# Patient Record
Sex: FEMALE | Race: WHITE | Employment: FULL TIME | URBAN - METROPOLITAN AREA
[De-identification: names, ages, dates, MRNs, and addresses within clinical notes are randomized per-mention and may not be internally consistent; named-entity substitution may affect disease eponyms.]

---

## 2017-01-03 ENCOUNTER — OFFICE VISIT (OUTPATIENT)
Dept: OTOLARYNGOLOGY | Facility: CLINIC | Age: 22
End: 2017-01-03
Payer: COMMERCIAL

## 2017-01-03 ENCOUNTER — HOSPITAL ENCOUNTER (EMERGENCY)
Facility: CLINIC | Age: 22
Discharge: HOME | End: 2017-01-03
Attending: NURSE PRACTITIONER | Admitting: NURSE PRACTITIONER
Payer: COMMERCIAL

## 2017-01-03 VITALS
OXYGEN SATURATION: 95 % | SYSTOLIC BLOOD PRESSURE: 125 MMHG | DIASTOLIC BLOOD PRESSURE: 80 MMHG | RESPIRATION RATE: 16 BRPM | TEMPERATURE: 98.1 F

## 2017-01-03 VITALS
HEIGHT: 61 IN | TEMPERATURE: 98.7 F | WEIGHT: 211 LBS | DIASTOLIC BLOOD PRESSURE: 80 MMHG | BODY MASS INDEX: 39.84 KG/M2 | SYSTOLIC BLOOD PRESSURE: 127 MMHG | HEART RATE: 115 BPM

## 2017-01-03 DIAGNOSIS — J31.2 CHRONIC PHARYNGITIS: Primary | ICD-10-CM

## 2017-01-03 DIAGNOSIS — J06.9 VIRAL URI WITH COUGH: ICD-10-CM

## 2017-01-03 DIAGNOSIS — J35.1 TONSILLAR HYPERTROPHY: ICD-10-CM

## 2017-01-03 LAB
INTERNAL QC OK POCT: YES
S PYO AG THROAT QL IA.RAPID: NEGATIVE

## 2017-01-03 PROCEDURE — 87081 CULTURE SCREEN ONLY: CPT | Performed by: NURSE PRACTITIONER

## 2017-01-03 PROCEDURE — 99213 OFFICE O/P EST LOW 20 MIN: CPT

## 2017-01-03 PROCEDURE — 99204 OFFICE O/P NEW MOD 45 MIN: CPT | Performed by: OTOLARYNGOLOGY

## 2017-01-03 PROCEDURE — 87880 STREP A ASSAY W/OPTIC: CPT | Performed by: NURSE PRACTITIONER

## 2017-01-03 PROCEDURE — 99213 OFFICE O/P EST LOW 20 MIN: CPT | Performed by: NURSE PRACTITIONER

## 2017-01-03 ASSESSMENT — ENCOUNTER SYMPTOMS
CARDIOVASCULAR NEGATIVE: 1
MUSCULOSKELETAL NEGATIVE: 1
EYES NEGATIVE: 1
CONSTITUTIONAL NEGATIVE: 1
NEUROLOGICAL NEGATIVE: 1
COUGH: 1
GASTROINTESTINAL NEGATIVE: 1
RHINORRHEA: 1

## 2017-01-03 ASSESSMENT — PAIN SCALES - GENERAL: PAINLEVEL: SEVERE PAIN (7)

## 2017-01-03 NOTE — DISCHARGE INSTRUCTIONS

## 2017-01-03 NOTE — PROGRESS NOTES
History of Present Illness - Chloe Obando is a 21 year old female who presents with a several year history of recurrent and persistent sore throat. She is currently having a URI and was in the urgent care this morning for a rapid strep, which was negative. She had a lot of strep throat in early childhood. She denies any tonsil stone production. She had anesthesia for wisdom tooth extraction without problems. She has no personal or family history of bleeding disorder.    Past Medical History -   Patient Active Problem List   Diagnosis     HYPERLIPIDEMIA     Irregular menstrual cycle     Other acne     Plantar warts     GERD (gastroesophageal reflux disease)     Obesity     panic attack     Generalized anxiety disorder     Major depressive disorder, single episode, mild (H)       Current Medications -   Current outpatient prescriptions:      escitalopram (LEXAPRO) 20 MG tablet, Take 1 tablet (20 mg) by mouth daily, Disp: 90 tablet, Rfl: 0     progesterone (PROMETRIUM) 200 MG capsule, Take 1 capsule (200 mg) by mouth daily for 10 days, Disp: 10 capsule, Rfl: 0    Allergies -   Allergies   Allergen Reactions     Hazelnuts [Nuts] Rash       Social History -   Social History     Social History     Marital Status: Single     Spouse Name: N/A     Number of Children: N/A     Years of Education: N/A     Social History Main Topics     Smoking status: Never Smoker      Smokeless tobacco: Never Used      Comment: parents don't smoke in the house     Alcohol Use: No     Drug Use: No     Sexual Activity:     Partners: Male     Birth Control/ Protection: Condom     Other Topics Concern     Parent/Sibling W/ Cabg, Mi Or Angioplasty Before 65f 55m? No     Social History Narrative       Family History -   Family History   Problem Relation Age of Onset     DIABETES Mother      gestational diabetes (history of)     Family History Negative Father      C.A.D. Maternal Grandmother      Quad. Bypass at age 74     DIABETES Maternal  "Grandmother      Asthma Maternal Grandmother      Lipids Maternal Grandmother      Arthritis Maternal Grandfather      Hypertension Paternal Grandmother      Lipids Paternal Grandmother      cholesterol     Family History Negative Paternal Grandfather        Review of Systems - As per HPI and PMHx, otherwise 7 system review of the head and neck negative. 10+ system review negative.    Physical Exam  /80 mmHg  Pulse 115  Temp(Src) 98.7  F (37.1  C) (Oral)  Ht 1.549 m (5' 1\")  Wt 95.709 kg (211 lb)  BMI 39.89 kg/m2  LMP 08/02/2016  General - The patient is well nourished and well developed, and appears to have good nutritional status.  Alert and oriented to person and place, answers questions and cooperates with examination appropriately.   Head and Face - Normocephalic and atraumatic, with no gross asymmetry noted of the contour of the facial features.  The facial nerve is intact, with strong symmetric movements.  Voice and Breathing - The patient was breathing comfortably without the use of accessory muscles. There was no wheezing, stridor, or stertor.  The patients voice was clear and strong, and had appropriate pitch and quality.  Ears - Bilateral pinna and EACs with normal appearing overlying skin. Tympanic membrane intact with good mobility on pneumatic otoscopy bilaterally. Bony landmarks of the ossicular chain are normal. The tympanic membranes are normal in appearance. No retraction, perforation, or masses.  No fluid or purulence was seen in the external canal or the middle ear.   Eyes - Extraocular movements intact.  Sclera were not icteric or injected, conjunctiva were pink and moist.  Mouth - Examination of the oral cavity showed pink, healthy oral mucosa. No lesions or ulcerations noted.  The tongue was mobile and midline, and the dentition were in good condition.    Throat - The walls of the oropharynx were smooth, pink, moist, symmetric, and had no lesions or ulcerations.  The tonsillar " pillars and soft palate were symmetric.  The uvula was midline on elevation. Tonsils are 3+ bilateral with purulent exudate.  Neck - Normal midline excursion of the laryngotracheal complex during swallowing.  Full range of motion on passive movement.  Palpation of the occipital, submental, submandibular, internal jugular chain, and supraclavicular nodes did not demonstrate any abnormal lymph nodes or masses.  The carotid pulse was palpable bilaterally.  Palpation of the thyroid was soft and smooth, with no nodules or goiter appreciated.  The trachea was mobile and midline.  Nose - External contour is symmetric, no gross deflection or scars.  Nasal mucosa is pink and moist with no abnormal mucus.  The septum was midline and non-obstructive, turbinates of normal size and position.  No polyps, masses, or purulence noted on examination.  Heart:  Regular rate and rhythm, no murmurs.  Lungs:  Chest clear to auscultation bilaterally    Rapid strep today is negative.      Assessment - Chloe Obando is a 21 year old female with current acute nonstreptococcal tonsillitis, but with a long history of trouble with sore throat on most days of the year. Based on the physical exam and history, my recommendation is for tonsillectomy (without adenoidectomy).  The remainder of the visit was spent discussing the risks and benefits of tonsillectomy.  These included:  The risks of general anesthesia, bleeding, infection, possible need for emergency surgery to control bleeding, possible alteration of speech and swallowing, and even the possibility of continued throat problems following surgery.  She will consider the option and call to schedule if desired.         Dr. Kamini Valdovinos MD  Otolaryngology  [facility]

## 2017-01-03 NOTE — ED PROVIDER NOTES
History     Chief Complaint   Patient presents with     Pharyngitis     HPI  Chloe Obando is a 21 year old female who comes to  with concern of strep. She has had 3 days of cough and nasal congestion symtpoms as well as a sore thraot. No fever or chills.     I have reviewed the Medications, Allergies, Past Medical and Surgical History, and Social History in the Epic system.    Review of Systems   Constitutional: Negative.    HENT: Positive for congestion, postnasal drip and rhinorrhea.    Eyes: Negative.    Respiratory: Positive for cough.    Cardiovascular: Negative.    Gastrointestinal: Negative.    Genitourinary: Negative.    Musculoskeletal: Negative.    Skin: Negative.    Neurological: Negative.        Physical Exam   BP: 125/80 mmHg  Heart Rate: 105  Temp: 98.1  F (36.7  C)  Resp: 16  SpO2: 95 %  Physical Exam   Constitutional: She is oriented to person, place, and time. She appears well-developed and well-nourished. No distress.   HENT:   Bilateral effusions  Nose congested  PND  Mildly red thraot   Eyes: Conjunctivae and EOM are normal.   Neck: Neck supple.   Cardiovascular: Normal rate, regular rhythm and normal heart sounds.    No murmur heard.  Pulmonary/Chest: Effort normal. No respiratory distress. She has no wheezes. She has no rales. She exhibits no tenderness.   Abdominal: Soft.   Musculoskeletal: Normal range of motion. She exhibits no edema or tenderness.   Lymphadenopathy:     She has cervical adenopathy.   Neurological: She is alert and oriented to person, place, and time. No cranial nerve deficit.   Skin: Skin is warm. No rash noted.       ED Course   Procedures           Labs Ordered and Resulted from Time of ED Arrival Up to the Time of Departure from the ED   RAPID STREP GROUP A SCREEN POCT   BETA STREP GROUP A CULTURE   strep negative    Assessments & Plan (with Medical Decision Making)   Viral pharyngitis    I have reviewed the nursing notes.    I have reviewed the findings, diagnosis,  plan and need for follow up with the patient.    New Prescriptions    No medications on file       Final diagnoses:   Viral URI with cough       1/3/2017   [facility] EMERGENCY DEPARTMENT      Robby Whitley, ANU CNP  01/03/17 [id number]

## 2017-01-03 NOTE — NURSING NOTE
"Initial /80 mmHg  Pulse 115  Temp(Src) 98.7  F (37.1  C) (Oral)  Ht 1.549 m (5' 1\")  Wt 95.709 kg (211 lb)  BMI 39.89 kg/m2  LMP 08/02/2016 Estimated body mass index is 39.89 kg/(m^2) as calculated from the following:    Height as of this encounter: 1.549 m (5' 1\").    Weight as of this encounter: 95.709 kg (211 lb). .    Nicky Rice CMA    "

## 2017-01-03 NOTE — ED AVS SNAPSHOT
[facility] Emergency Department    [address] [address]    Phone:  [phone]    Fax:  [phone]                                       Chloe Obando   MRN: [id number]    Department:  [facility] Emergency Department   Date of Visit:  1/3/2017           Patient Information     Date Of Birth          1995        Your diagnoses for this visit were:     Viral URI with cough        You were seen by Robby Whitley, ANU CNP.      Follow-up Information     Follow up with Mervat Schroeder DO.    Specialties:  Family Practice, Urgent Care    Why:  As needed, If symptoms worsen    Contact information:    [facility]  [address] [place] [address]  [phone]          Discharge Instructions         Viral Upper Respiratory Illness (Adult)  You have a viral upper respiratory illness (URI), which is another term for the common cold. This illness is contagious during the first few days. It is spread through the air by coughing and sneezing. It may also be spread by direct contact (touching the sick person and then touching your own eyes, nose, or mouth). Frequent handwashing will decrease risk of spread. Most viral illnesses go away within 7 to 10 days with rest and simple home remedies. Sometimes the illness may last for several weeks. Antibiotics will not kill a virus, and they are generally not prescribed for this condition.    Home care    If symptoms are severe, rest at home for the first 2 to 3 days. When you resume activity, don't let yourself get too tired.    Avoid being exposed to cigarette smoke (yours or others ).    You may use acetaminophen or ibuprofen to control pain and fever, unless another medicine was prescribed. (Note: If you have chronic liver or kidney disease, have ever had a stomach ulcer or gastrointestinal bleeding, or are taking blood-thinning medicines, talk with your healthcare provider before using these medicines.) Aspirin should  never be given to anyone under 18 years of age who is ill with a viral infection or fever. It may cause severe liver or brain damage.    Your appetite may be poor, so a light diet is fine. Avoid dehydration by drinking 6 to 8 glasses of fluids per day (water, soft drinks, juices, tea, or soup). Extra fluids will help loosen secretions in the nose and lungs.    Over-the-counter cold medicines will not shorten the length of time you re sick, but they may be helpful for the following symptoms: cough, sore throat, and nasal and sinus congestion. (Note: Do not use decongestants if you have high blood pressure.)  Follow-up care  Follow up with your healthcare provider, or as advised.  When to seek medical advice  Call your healthcare provider right away if any of these occur:    Cough with lots of colored sputum (mucus)    Severe headache; face, neck, or ear pain    Difficulty swallowing due to throat pain    Fever of 100.4 F (38 C)  Call 911, or get immediate medical care  Call emergency services right away if any of these occur:    Chest pain, shortness of breath, wheezing, or difficulty breathing    Coughing up blood    Inability to swallow due to throat pain    7242-8374 The [employer]. [address] [place] [address]. All rights reserved. This information is not intended as a substitute for professional medical care. Always follow your healthcare professional's instructions.          24 Hour Appointment Hotline       To make an appointment at any [facility], call [phone] ([phone]). If you don't have a family doctor or clinic, we will help you find one. [facility] clinics are conveniently located to serve the needs of you and your family.             Review of your medicines      Our records show that you are taking the medicines listed below. If these are incorrect, please call your family doctor or clinic.        Dose / Directions Last dose taken    escitalopram 20 MG tablet    Commonly known as:  LEXAPRO   Dose:  20 mg   Quantity:  90 tablet        Take 1 tablet (20 mg) by mouth daily   Refills:  0        progesterone 200 MG capsule   Commonly known as:  PROMETRIUM   Dose:  200 mg   Quantity:  10 capsule        Take 1 capsule (200 mg) by mouth daily for 10 days   Refills:  0                Procedures and tests performed during your visit     Beta strep group A r/o culture    Rapid strep group A screen POCT      Orders Needing Specimen Collection     None      Pending Results     No orders found from 1/2/2017 to 1/4/2017.            Pending Culture Results     No orders found from 1/2/2017 to 1/4/2017.       Test Results from your hospital stay           1/3/2017 12:32 PM - Dior Romo LPN      Component Results     Component Value Ref Range & Units Status    Rapid Strep A Screen negative neg Final    Internal QC OK Yes  Final                Thank you for choosing [facility]       Thank you for choosing [facility] for your care. Our goal is always to provide you with excellent care. Hearing back from our patients is one way we can continue to improve our services. Please take a few minutes to complete the written survey that you may receive in the mail after you visit with us. Thank you!        Priv[employer] Information     [employer] gives you secure access to your electronic health record. If you see a primary care provider, you can also send messages to your care team and make appointments. If you have questions, please call your primary care clinic.  If you do not have a primary care provider, please call [phone] and they will assist you.        Care EveryWhere ID     This is your Care EveryWhere ID. This could be used by other organizations to access your [facility] medical records  [id number]        After Visit Summary       This is your record. Keep this with you and show to your community pharmacist(s) and doctor(s) at your next visit.

## 2017-01-05 LAB
BACTERIA SPEC CULT: NORMAL
MICRO REPORT STATUS: NORMAL
SPECIMEN SOURCE: NORMAL

## 2017-01-16 ENCOUNTER — TELEPHONE (OUTPATIENT)
Dept: OTOLARYNGOLOGY | Facility: CLINIC | Age: 22
End: 2017-01-16

## 2017-01-16 NOTE — TELEPHONE ENCOUNTER
Reason for Call:  Other surgery    Detailed comments: Pt wants to schedule surgery, please call    Phone Number Patient can be reached at: Home number on file [phone] (home)    Best Time: today    Can we leave a detailed message on this number? YES    Call taken on 1/16/2017 at 12:20 PM by Carmencita Alcala

## 2017-01-16 NOTE — TELEPHONE ENCOUNTER
Surgery Pre-Certification    Medical Record Number: [id number]  Chloe Obando  YOB: 1995   Phone: [phone] (home)   Primary Provider: Mervat Schroeder    Diagnosis and ICD-10 Code:  Recurrent Tonsillitis (J03.00)    Surgeon: KEREN Valdovinos MD  Surgical Procedure: Tonsillectomy  BMI:  39.89  Consent signed? No    Date signed: N/A  Hospital: [facility]  In-Patient/ Out-Patient status: Outpatient  Length of surgery: 20 Minutes  Anesthesia: GEN  Implanted Cardiac Device: No    Date of Surgery: 3/1/17  When post-op appointment needed: 4 Weeks     Special Requests/Equipment: None     Requestor: Brenda Gerard LPN

## 2017-01-16 NOTE — TELEPHONE ENCOUNTER
Attempted to call patient to schedule surgery but no answer Left  Message for patient to call us at [phone]  to schedule surgery.Brenda Gerard LPN

## 2017-01-16 NOTE — Clinical Note
SURGERYPLANNING/SCHEDULING WORKSHEET                              [facility]  [address]  [facility] [address]  [phone]                          Chloe Obando                :  1995  MRN:  [id number]  Phone: [phone] (home)    Same Day Surgery [phone]   Surgeon: Kamini Valdovinos MD  Diagnosis:   Recurrent tonsillitis  Allergies:  Hazelnuts   A preoperative evaluation by the primary care provider has been requested to summarize and modify, where possible, medical risks to the proposed surgery.  Specific risks requiring evaluation include   Patient Active Problem List    Diagnosis     Generalized anxiety disorder     Major depressive disorder, single episode, mild (H)     panic attack     Obesity     GERD (gastroesophageal reflux disease)     Plantar warts     Other acne     Irregular menstrual cycle     HYPERLIPIDEMIA       ====================================================  Surgical Procedure:  ENT bilateral Tonsillectomy  Length of Procedure:  20 minutes  Type of anesthesia:  General  The proposed surgical procedure is considered LOW risk.  Date of Procedure:___3/1/17_____________    Time: ____will call_________________       Informed Consent Obtained and Signed:  NO  ====================================================  Instructions to Same Day Surgery Staff  none  Special Equipment: None  Preop Antibiotic:  none  Preop Pain Meds:  None  PreOp Orders:  None  ====================================================  Instructions to the patient:  Preop physical exam scheduled (within 30 days or 7 days prior) with:   _______Alexandre_____________  Clinic:  ____[facility]________________                                         Date______________Time_________________________  Come to the hospital at: _____1 hour prior to surgery___________________________  HOME PREPARATION:   Bathe and brush teeth the morning of  surgery.  Take medications with a sip of water the morning of surgery:     May have  a light meal, toast and clear liquids, up to 6 hrs before surgery  May have clear liquids (liquids one can read through) up to 4 hrs before surgery  NOTHING after 4 hrs before surgery  Stop aspirin 7-10 days before surgery  Stop NSAIDS (Ibuproven, Naproxen, etc) 5 days before surgery  Stop Plavix 7-10 days before surgery      Kamini Valdovinos MD    1/16/2017

## 2017-01-17 ENCOUNTER — MYC MEDICAL ADVICE (OUTPATIENT)
Dept: FAMILY MEDICINE | Facility: CLINIC | Age: 22
End: 2017-01-17

## 2017-01-17 DIAGNOSIS — N91.2 AMENORRHEA: Primary | ICD-10-CM

## 2017-02-06 ENCOUNTER — TELEPHONE (OUTPATIENT)
Dept: OTOLARYNGOLOGY | Facility: CLINIC | Age: 22
End: 2017-02-06

## 2017-02-06 NOTE — TELEPHONE ENCOUNTER
Attempted to call patient to reschedule surgery but no answer. I left message to call us at [phone] and let us   Know what day she wants to reschedule for.  Brenda Gerard LPN

## 2017-02-06 NOTE — TELEPHONE ENCOUNTER
Reason for Call:  Other surgery    Detailed comments: Pt wants to reschedule surgery, please call her    Phone Number Patient can be reached at: Home number on file [phone] (home)    Best Time: today    Can we leave a detailed message on this number? YES    Call taken on 2/6/2017 at 12:11 PM by Carmecnita Alcala

## 2017-02-16 ENCOUNTER — TELEPHONE (OUTPATIENT)
Dept: OTOLARYNGOLOGY | Facility: CLINIC | Age: 22
End: 2017-02-16

## 2017-02-16 NOTE — TELEPHONE ENCOUNTER
Patient called and surgery rescheduled from 3/8/17 to 3/15/17 at her request.  Brenda Gerard LPN

## 2017-02-16 NOTE — TELEPHONE ENCOUNTER
Reason for Call:  Patient is calling in to reschedule her surgery with Dr Valdovinos    Detailed comments: see above    Phone Number Patient can be reached at: Home number on file [phone] (home)    Best Time: any    Can we leave a detailed message on this number? YES    Call taken on 2/16/2017 at 12:31 PM by Liseth León

## 2017-03-09 ENCOUNTER — OFFICE VISIT (OUTPATIENT)
Dept: FAMILY MEDICINE | Facility: CLINIC | Age: 22
End: 2017-03-09
Payer: COMMERCIAL

## 2017-03-09 VITALS
DIASTOLIC BLOOD PRESSURE: 60 MMHG | SYSTOLIC BLOOD PRESSURE: 122 MMHG | TEMPERATURE: 97.6 F | WEIGHT: 212.4 LBS | BODY MASS INDEX: 40.13 KG/M2 | HEART RATE: 76 BPM

## 2017-03-09 DIAGNOSIS — Z01.818 PREOP GENERAL PHYSICAL EXAM: Primary | ICD-10-CM

## 2017-03-09 DIAGNOSIS — J02.0 STREP PHARYNGITIS: ICD-10-CM

## 2017-03-09 LAB
ALBUMIN SERPL-MCNC: 3.7 G/DL (ref 3.4–5)
ALP SERPL-CCNC: 102 U/L (ref 40–150)
ALT SERPL W P-5'-P-CCNC: 49 U/L (ref 0–50)
ANION GAP SERPL CALCULATED.3IONS-SCNC: 8 MMOL/L (ref 3–14)
AST SERPL W P-5'-P-CCNC: 24 U/L (ref 0–45)
BASOPHILS # BLD AUTO: 0 10E9/L (ref 0–0.2)
BASOPHILS NFR BLD AUTO: 0.4 %
BETA HCG QUAL IFA URINE: NEGATIVE
BILIRUB SERPL-MCNC: 0.4 MG/DL (ref 0.2–1.3)
BUN SERPL-MCNC: 9 MG/DL (ref 7–30)
CALCIUM SERPL-MCNC: 8.9 MG/DL (ref 8.5–10.1)
CHLORIDE SERPL-SCNC: 102 MMOL/L (ref 94–109)
CO2 SERPL-SCNC: 25 MMOL/L (ref 20–32)
CREAT SERPL-MCNC: 0.56 MG/DL (ref 0.52–1.04)
DIFFERENTIAL METHOD BLD: NORMAL
EOSINOPHIL # BLD AUTO: 0.1 10E9/L (ref 0–0.7)
EOSINOPHIL NFR BLD AUTO: 1.5 %
ERYTHROCYTE [DISTWIDTH] IN BLOOD BY AUTOMATED COUNT: 13.7 % (ref 10–15)
GFR SERPL CREATININE-BSD FRML MDRD: ABNORMAL ML/MIN/1.7M2
GLUCOSE SERPL-MCNC: 143 MG/DL (ref 70–99)
HCT VFR BLD AUTO: 39.2 % (ref 35–47)
HGB BLD-MCNC: 13.2 G/DL (ref 11.7–15.7)
LYMPHOCYTES # BLD AUTO: 2.8 10E9/L (ref 0.8–5.3)
LYMPHOCYTES NFR BLD AUTO: 30.5 %
MCH RBC QN AUTO: 28.1 PG (ref 26.5–33)
MCHC RBC AUTO-ENTMCNC: 33.7 G/DL (ref 31.5–36.5)
MCV RBC AUTO: 83 FL (ref 78–100)
MONOCYTES # BLD AUTO: 0.8 10E9/L (ref 0–1.3)
MONOCYTES NFR BLD AUTO: 8.8 %
NEUTROPHILS # BLD AUTO: 5.5 10E9/L (ref 1.6–8.3)
NEUTROPHILS NFR BLD AUTO: 58.8 %
PLATELET # BLD AUTO: 345 10E9/L (ref 150–450)
POTASSIUM SERPL-SCNC: 3.9 MMOL/L (ref 3.4–5.3)
PROT SERPL-MCNC: 8.4 G/DL (ref 6.8–8.8)
RBC # BLD AUTO: 4.7 10E12/L (ref 3.8–5.2)
SODIUM SERPL-SCNC: 135 MMOL/L (ref 133–144)
WBC # BLD AUTO: 9.3 10E9/L (ref 4–11)

## 2017-03-09 PROCEDURE — 84703 CHORIONIC GONADOTROPIN ASSAY: CPT | Performed by: NURSE PRACTITIONER

## 2017-03-09 PROCEDURE — 80053 COMPREHEN METABOLIC PANEL: CPT | Performed by: NURSE PRACTITIONER

## 2017-03-09 PROCEDURE — 36415 COLL VENOUS BLD VENIPUNCTURE: CPT | Performed by: NURSE PRACTITIONER

## 2017-03-09 PROCEDURE — 99214 OFFICE O/P EST MOD 30 MIN: CPT | Performed by: NURSE PRACTITIONER

## 2017-03-09 PROCEDURE — 85025 COMPLETE CBC W/AUTO DIFF WBC: CPT | Performed by: NURSE PRACTITIONER

## 2017-03-09 ASSESSMENT — ENCOUNTER SYMPTOMS
CHEST TIGHTNESS: 0
FATIGUE: 0
HEADACHES: 0
ABDOMINAL DISTENTION: 0
ARTHRALGIAS: 0
NERVOUS/ANXIOUS: 0
SORE THROAT: 0
FREQUENCY: 0
DIZZINESS: 0
CONSTIPATION: 0
DIFFICULTY URINATING: 0
NUMBNESS: 0
DYSPHORIC MOOD: 0
POLYDIPSIA: 0
ABDOMINAL PAIN: 0
ACTIVITY CHANGE: 0
SLEEP DISTURBANCE: 0
DIARRHEA: 0
RHINORRHEA: 0
VOMITING: 0
WHEEZING: 0
PALPITATIONS: 0
COUGH: 0
NAUSEA: 0
SHORTNESS OF BREATH: 0
POLYPHAGIA: 0
LIGHT-HEADEDNESS: 0

## 2017-03-09 NOTE — NURSING NOTE
"Chief Complaint   Patient presents with     Pre-Op Exam       Initial /60 (BP Location: Right arm, Patient Position: Chair, Cuff Size: Adult Large)  Pulse 76  Temp 97.6  F (36.4  C) (Tympanic)  Wt 212 lb 6.4 oz (96.3 kg)  BMI 40.13 kg/m2 Estimated body mass index is 40.13 kg/(m^2) as calculated from the following:    Height as of 1/3/17: 5' 1\" (1.549 m).    Weight as of this encounter: 212 lb 6.4 oz (96.3 kg).  Medication Reconciliation: complete     April SALVADOR Patino      "

## 2017-03-09 NOTE — PROGRESS NOTES
[facility]  [address] [address]  [phone]  Dept: [phone]    PRE-OP EVALUATION:  Today's date: 3/9/2017    Chloe Obando (: 1995) presents for pre-operative evaluation assessment as requested by Dr. Valdovinos.  She requires evaluation and anesthesia risk assessment prior to undergoing surgery/procedure for treatment of chronic strep throat and pharyngitis .  Proposed procedure: tonsillectomy    Date of Surgery/ Procedure: 3/15/17  Time of Surgery/ Procedure: 8:55 am per [facility]/Surgical Facility: [facility]  Primary Physician: Mervat Schroeder  Type of Anesthesia Anticipated: General    Patient has a Health Care Directive or Living Will:  NO    1. NO - Do you have a history of heart attack, stroke, stent, bypass or surgery on an artery in the head, neck, heart or legs?  2. NO - Do you ever have any pain or discomfort in your chest?  3. NO - Do you have a history of  Heart Failure?  4. NO - Are you troubled by shortness of breath when: walking on the level, up a slight hill or at night?  5. NO - Do you currently have a cold, bronchitis or other respiratory infection?  6. NO - Do you have a cough, shortness of breath or wheezing?  7. NO - Do you sometimes get pains in the calves of your legs when you walk?  8. NO - Do you or anyone in your family have previous history of blood clots?  9. YES- Do you or does anyone in your family have a serious bleeding problem such as prolonged bleeding following surgeries or cuts? Grandfather takes Coumadin  10. NO - Have you ever had problems with anemia or been told to take iron pills?  11. NO - Have you had any abnormal blood loss such as black, tarry or bloody stools, or abnormal vaginal bleeding?  12. NO - Have you ever had a blood transfusion?  13. NO - Have you or any of your relatives ever had problems with anesthesia?  14. NO - Do you have sleep apnea, excessive snoring or daytime drowsiness?  15. NO - Do you  have any prosthetic heart valves?  16. NO - Do you have prosthetic joints?  17. NO - Is there any chance that you may be pregnant?      HPI:                                                      Brief HPI related to upcoming procedure: Here today for pre-op for tonsil removal on Wed 3/18/17. No previous problems with anesthesia. No family history of anesthesia problems.   Is sexually active. Had been on pills in the past. Is currently sexually active. Does use condoms. Does not get periods. Last period was over a year ago. Has not been taking Lexapro.     See problem list for active medical problems.  Problems all longstanding and stable, except as noted/documented.  See ROS for pertinent symptoms related to these conditions.                                                                                                  .    MEDICAL HISTORY:                                                      Patient Active Problem List    Diagnosis Date Noted     Generalized anxiety disorder 11/24/2015     Priority: Medium     Major depressive disorder, single episode, mild (H) 11/24/2015     Priority: Medium     panic attack 05/28/2015     Priority: Medium     Obesity 11/21/2013     Priority: Medium     GERD (gastroesophageal reflux disease) 12/11/2012     Priority: Medium     11/2012: Trial of Prilosec for 8 weeks.       Plantar warts 02/10/2011     Priority: Medium     Other acne 11/05/2007     Priority: Medium     05/07/09: trial of Minocycline 100 mg qd,  Retin-A 0.025% qHS.  06/2011: Off meds for 2 years.  Restart minocycline 100 mg qd and Benzyl peroxide qd.  01/2012: Increase minocycline 100 mg to BID, add on retin-A 0.025%, refer to dermatology.       Irregular menstrual cycle 07/19/2007     Priority: Medium     07/07: skipping months. Likely secondary to immature hypothalamic/pituitary/ovarian axis.       HYPERLIPIDEMIA 10/05/2006     Priority: Medium     Noted in transferred clinic records.  Recommend repeat  fasting.  05/07/09:  Lipids improved.  Now within normal range.        Past Medical History   Diagnosis Date     Closed fracture of unspecified part of humerus      Left Humerus Fracture     Hematuria as child     Urinary tract infection, site not specified as child     nl renal U/S, no VCUG     History reviewed. No pertinent past surgical history.  Current Outpatient Prescriptions   Medication Sig Dispense Refill     escitalopram (LEXAPRO) 20 MG tablet Take 1 tablet (20 mg) by mouth daily (Patient not taking: Reported on 3/9/2017) 90 tablet 0     OTC products: None, except as noted above    Allergies   Allergen Reactions     Hazelnuts [Nuts] Rash      Latex Allergy: NO    Social History   Substance Use Topics     Smoking status: Never Smoker     Smokeless tobacco: Never Used      Comment: parents don't smoke in the house     Alcohol use No     History   Drug Use No       REVIEW OF SYSTEMS:                                                    Review of Systems   Constitutional: Negative for activity change and fatigue.   HENT: Negative for ear pain, rhinorrhea and sore throat.    Respiratory: Negative for cough, chest tightness, shortness of breath and wheezing.    Cardiovascular: Negative for chest pain, palpitations and leg swelling.   Gastrointestinal: Negative for abdominal distention, abdominal pain, constipation, diarrhea, nausea and vomiting.   Endocrine: Negative for cold intolerance, heat intolerance, polydipsia, polyphagia and polyuria.   Genitourinary: Negative for difficulty urinating, enuresis, frequency and urgency.   Musculoskeletal: Negative for arthralgias.   Skin: Negative for rash.   Neurological: Negative for dizziness, light-headedness, numbness and headaches.   Psychiatric/Behavioral: Negative for dysphoric mood and sleep disturbance. The patient is not nervous/anxious.          EXAM:                                                    /60 (BP Location: Right arm, Patient Position: Chair,  Cuff Size: Adult Large)  Pulse 76  Temp 97.6  F (36.4  C) (Tympanic)  Wt 212 lb 6.4 oz (96.3 kg)  BMI 40.13 kg/m2  Physical Exam   Constitutional: She appears well-developed and well-nourished.   HENT:   Head: Normocephalic and atraumatic.   Right Ear: Tympanic membrane and external ear normal. No middle ear effusion.   Left Ear: Tympanic membrane and external ear normal.  No middle ear effusion.   Nose: No mucosal edema.   Mouth/Throat: Uvula is midline, oropharynx is clear and moist and mucous membranes are normal.   Eyes: Pupils are equal, round, and reactive to light.   Neck: Normal range of motion. Carotid bruit is not present. No thyromegaly present.   Cardiovascular: Normal rate, regular rhythm and normal heart sounds.    Pulses:       Femoral pulses are 2+ on the right side, and 2+ on the left side.  Pulmonary/Chest: Effort normal and breath sounds normal.   Abdominal: Soft. Normal appearance and bowel sounds are normal. There is no tenderness.   Musculoskeletal: Normal range of motion.   Neurological: She is alert.   Skin: Skin is warm and dry. No rash noted.   Psychiatric: She has a normal mood and affect. Her behavior is normal.       DIAGNOSTICS:                                                    EKG: Not indicated due to non-vascular surgery and low risk of event (age <65 and without cardiac risk factors)    Recent Labs   Lab Test  04/26/16   1120  07/22/15   1046  09/04/14   1010   HGB   --   12.6  13.1   PLT   --   339  328   NA  138  137  138   POTASSIUM  4.0  3.8  4.1   CR  0.60  0.56  0.56   A1C   --   5.7   --         IMPRESSION:                                                        The proposed surgical procedure is considered LOW risk.    REVISED CARDIAC RISK INDEX  The patient has the following serious cardiovascular risks for perioperative complications such as (MI, PE, VFib and 3  AV Block):  No serious cardiac risks  INTERPRETATION: 0 risks: Class I (very low risk - 0.4% complication  rate)    The patient has the following additional risks for perioperative complications:  No identified additional risks      ICD-10-CM    1. Preop general physical exam Z01.818        RECOMMENDATIONS:                                                        APPROVAL GIVEN to proceed with proposed procedure, pending result of lab       Signed Electronically by: ANU Orozco CNP    Copy of this evaluation report is provided to requesting physician.    [facility] Preop Guidelines

## 2017-03-09 NOTE — MR AVS SNAPSHOT
After Visit Summary   3/9/2017    Chloe Obando    MRN: [id number]           Patient Information     Date Of Birth          1995        Visit Information        Provider Department      3/9/2017 2:40 PM Piedad Briones APRN [facility]        Today's Diagnoses     Preop general physical exam    -  1    Strep pharyngitis          Care Instructions      Before Your Surgery      Call your surgeon if there is any change in your health. This includes signs of a cold or flu (such as a sore throat, runny nose, cough, rash or fever).    Do not smoke, drink alcohol or take over the counter medicine (unless your surgeon or primary care doctor tells you to) for the 24 hours before and after surgery.    If you take prescribed drugs: Follow your doctor s orders about which medicines to take and which to stop until after surgery.    Eating and drinking prior to surgery: follow the instructions from your surgeon    Take a shower or bath the night before surgery. Use the soap your surgeon gave you to gently clean your skin. If you do not have soap from your surgeon, use your regular soap. Do not shave or scrub the surgery site.  Wear clean pajamas and have clean sheets on your bed.         Follow-ups after your visit        Your next 10 appointments already scheduled     Mar 15, 2017   Procedure with Kamini Valdovinos MD   [facility] (--)    [address] [place] [id number] [phone]           The medical center is located at [address] [place] [address]. (between [facility] and [address] in [place], [place] miles north of [place]).            Mar 27, 2017 11:00 AM CDT   Office Visit with Kathy Woodson MD   [facility]    [address] [place] [id number] [phone]           Bring a current list of meds and any records pertaining to this visit.  For Physicals, please bring immunization records  and any forms needing to be filled out.  Please arrive 10 minutes early to complete paperwork.              Who to contact     Normal or non-critical lab and imaging results will be communicated to you by [employer], letter or phone within 4 business days after the clinic has received the results. If you do not hear from us within 7 days, please contact the clinic through [employer] or phone. If you have a critical or abnormal lab result, we will notify you by phone as soon as possible.  Submit refill requests through [employer] or call your pharmacy and they will forward the refill request to us. Please allow 3 business days for your refill to be completed.          If you need to speak with a  for additional information , please call: [phone]           Additional Information About Your Visit        [employer] Information     [employer] gives you secure access to your electronic health record. If you see a primary care provider, you can also send messages to your care team and make appointments. If you have questions, please call your primary care clinic.  If you do not have a primary care provider, please call [phone] and they will assist you.        Care EveryWhere ID     This is your Care EveryWhere ID. This could be used by other organizations to access your [facility] medical records  [id number]        Your Vitals Were     Pulse Temperature BMI (Body Mass Index)             76 97.6  F (36.4  C) (Tympanic) 40.13 kg/m2          Blood Pressure from Last 3 Encounters:   03/09/17 122/60   01/03/17 127/80   01/03/17 125/80    Weight from Last 3 Encounters:   03/09/17 212 lb 6.4 oz (96.3 kg)   01/03/17 211 lb (95.7 kg)   12/15/16 211 lb 12.8 oz (96.1 kg)              We Performed the Following     Beta HCG qual IFA urine     CBC with platelets differential     Comprehensive metabolic panel        Primary Care Provider Office Phone # Fax #    Mervat Schroeder [phone]       [place] [address] [place] [address]        Thank you!     Thank you for choosing [facility]  for your care. Our goal is always to provide you with excellent care. Hearing back from our patients is one way we can continue to improve our services. Please take a few minutes to complete the written survey that you may receive in the mail after your visit with us. Thank you!             Your Updated Medication List - Protect others around you: Learn how to safely use, store and throw away your medicines at www.disposemymeds.org.          This list is accurate as of: 3/9/17  2:56 PM.  Always use your most recent med list.                   Brand Name Dispense Instructions for use    escitalopram 20 MG tablet    LEXAPRO    90 tablet    Take 1 tablet (20 mg) by mouth daily

## 2017-03-10 ASSESSMENT — PATIENT HEALTH QUESTIONNAIRE - PHQ9: SUM OF ALL RESPONSES TO PHQ QUESTIONS 1-9: 7

## 2017-03-14 ENCOUNTER — ANESTHESIA EVENT (OUTPATIENT)
Dept: SURGERY | Facility: CLINIC | Age: 22
End: 2017-03-14
Payer: COMMERCIAL

## 2017-03-15 ENCOUNTER — SURGERY (OUTPATIENT)
Age: 22
End: 2017-03-15

## 2017-03-15 ENCOUNTER — ANESTHESIA (OUTPATIENT)
Dept: SURGERY | Facility: CLINIC | Age: 22
End: 2017-03-15
Payer: COMMERCIAL

## 2017-03-15 ENCOUNTER — HOSPITAL ENCOUNTER (OUTPATIENT)
Facility: CLINIC | Age: 22
Discharge: HOME | End: 2017-03-15
Attending: OTOLARYNGOLOGY | Admitting: OTOLARYNGOLOGY
Payer: COMMERCIAL

## 2017-03-15 VITALS
TEMPERATURE: 98.2 F | DIASTOLIC BLOOD PRESSURE: 68 MMHG | HEIGHT: 61 IN | HEART RATE: 79 BPM | BODY MASS INDEX: 40.02 KG/M2 | SYSTOLIC BLOOD PRESSURE: 114 MMHG | RESPIRATION RATE: 18 BRPM | WEIGHT: 212 LBS | OXYGEN SATURATION: 99 %

## 2017-03-15 DIAGNOSIS — J35.01 CHRONIC TONSILLITIS: Primary | ICD-10-CM

## 2017-03-15 LAB — HCG UR QL: NEGATIVE

## 2017-03-15 PROCEDURE — 25000128 H RX IP 250 OP 636: Performed by: NURSE ANESTHETIST, CERTIFIED REGISTERED

## 2017-03-15 PROCEDURE — 37000008 ZZH ANESTHESIA TECHNICAL FEE, 1ST 30 MIN: Performed by: OTOLARYNGOLOGY

## 2017-03-15 PROCEDURE — 36000050 ZZH SURGERY LEVEL 2 1ST 30 MIN: Performed by: OTOLARYNGOLOGY

## 2017-03-15 PROCEDURE — 42826 REMOVAL OF TONSILS: CPT | Performed by: OTOLARYNGOLOGY

## 2017-03-15 PROCEDURE — 88304 TISSUE EXAM BY PATHOLOGIST: CPT | Mod: 26 | Performed by: OTOLARYNGOLOGY

## 2017-03-15 PROCEDURE — 25000125 ZZHC RX 250: Performed by: NURSE ANESTHETIST, CERTIFIED REGISTERED

## 2017-03-15 PROCEDURE — 25000566 ZZH SEVOFLURANE, EA 15 MIN: Performed by: OTOLARYNGOLOGY

## 2017-03-15 PROCEDURE — 71000012 ZZH RECOVERY PHASE 1 LEVEL 1 FIRST HR: Performed by: OTOLARYNGOLOGY

## 2017-03-15 PROCEDURE — 40000305 ZZH STATISTIC PRE PROC ASSESS I: Performed by: OTOLARYNGOLOGY

## 2017-03-15 PROCEDURE — 36000052 ZZH SURGERY LEVEL 2 EA 15 ADDTL MIN: Performed by: OTOLARYNGOLOGY

## 2017-03-15 PROCEDURE — 25000132 ZZH RX MED GY IP 250 OP 250 PS 637: Performed by: OTOLARYNGOLOGY

## 2017-03-15 PROCEDURE — 71000027 ZZH RECOVERY PHASE 2 EACH 15 MINS: Performed by: OTOLARYNGOLOGY

## 2017-03-15 PROCEDURE — 81025 URINE PREGNANCY TEST: CPT | Performed by: NURSE ANESTHETIST, CERTIFIED REGISTERED

## 2017-03-15 PROCEDURE — 25800025 ZZH RX 258: Performed by: NURSE ANESTHETIST, CERTIFIED REGISTERED

## 2017-03-15 PROCEDURE — 88304 TISSUE EXAM BY PATHOLOGIST: CPT | Performed by: OTOLARYNGOLOGY

## 2017-03-15 PROCEDURE — 37000009 ZZH ANESTHESIA TECHNICAL FEE, EACH ADDTL 15 MIN: Performed by: OTOLARYNGOLOGY

## 2017-03-15 RX ORDER — ONDANSETRON 4 MG/1
4 TABLET, ORALLY DISINTEGRATING ORAL EVERY 30 MIN PRN
Status: DISCONTINUED | OUTPATIENT
Start: 2017-03-15 | End: 2017-03-15 | Stop reason: HOSPADM

## 2017-03-15 RX ORDER — LIDOCAINE 40 MG/G
CREAM TOPICAL
Status: DISCONTINUED | OUTPATIENT
Start: 2017-03-15 | End: 2017-03-15 | Stop reason: HOSPADM

## 2017-03-15 RX ORDER — ALBUTEROL SULFATE 0.83 MG/ML
2.5 SOLUTION RESPIRATORY (INHALATION) EVERY 4 HOURS PRN
Status: DISCONTINUED | OUTPATIENT
Start: 2017-03-15 | End: 2017-03-15 | Stop reason: HOSPADM

## 2017-03-15 RX ORDER — OXYCODONE HCL 5 MG/5 ML
10 SOLUTION, ORAL ORAL EVERY 4 HOURS PRN
Qty: 473 ML | Refills: 0 | Status: SHIPPED | OUTPATIENT
Start: 2017-03-15 | End: 2017-04-13

## 2017-03-15 RX ORDER — FENTANYL CITRATE 50 UG/ML
25-50 INJECTION, SOLUTION INTRAMUSCULAR; INTRAVENOUS
Status: DISCONTINUED | OUTPATIENT
Start: 2017-03-15 | End: 2017-03-15 | Stop reason: HOSPADM

## 2017-03-15 RX ORDER — OXYCODONE AND ACETAMINOPHEN 5; 325 MG/1; MG/1
1-2 TABLET ORAL
Status: COMPLETED | OUTPATIENT
Start: 2017-03-15 | End: 2017-03-15

## 2017-03-15 RX ORDER — ONDANSETRON 2 MG/ML
4 INJECTION INTRAMUSCULAR; INTRAVENOUS EVERY 30 MIN PRN
Status: DISCONTINUED | OUTPATIENT
Start: 2017-03-15 | End: 2017-03-15 | Stop reason: HOSPADM

## 2017-03-15 RX ORDER — MEPERIDINE HYDROCHLORIDE 25 MG/ML
12.5 INJECTION INTRAMUSCULAR; INTRAVENOUS; SUBCUTANEOUS
Status: DISCONTINUED | OUTPATIENT
Start: 2017-03-15 | End: 2017-03-15 | Stop reason: HOSPADM

## 2017-03-15 RX ORDER — FENTANYL CITRATE 50 UG/ML
INJECTION, SOLUTION INTRAMUSCULAR; INTRAVENOUS PRN
Status: DISCONTINUED | OUTPATIENT
Start: 2017-03-15 | End: 2017-03-15

## 2017-03-15 RX ORDER — HYDROXYZINE HYDROCHLORIDE 25 MG/1
25 TABLET, FILM COATED ORAL EVERY 6 HOURS PRN
Status: DISCONTINUED | OUTPATIENT
Start: 2017-03-15 | End: 2017-03-15 | Stop reason: HOSPADM

## 2017-03-15 RX ORDER — SCOLOPAMINE TRANSDERMAL SYSTEM 1 MG/1
PATCH, EXTENDED RELEASE TRANSDERMAL PRN
Status: DISCONTINUED | OUTPATIENT
Start: 2017-03-15 | End: 2017-03-15

## 2017-03-15 RX ORDER — GLYCOPYRROLATE 0.2 MG/ML
INJECTION, SOLUTION INTRAMUSCULAR; INTRAVENOUS PRN
Status: DISCONTINUED | OUTPATIENT
Start: 2017-03-15 | End: 2017-03-15

## 2017-03-15 RX ORDER — HYDROXYZINE HYDROCHLORIDE 50 MG/1
50 TABLET, FILM COATED ORAL EVERY 6 HOURS PRN
Status: DISCONTINUED | OUTPATIENT
Start: 2017-03-15 | End: 2017-03-15 | Stop reason: HOSPADM

## 2017-03-15 RX ORDER — DEXAMETHASONE SODIUM PHOSPHATE 4 MG/ML
INJECTION, SOLUTION INTRA-ARTICULAR; INTRALESIONAL; INTRAMUSCULAR; INTRAVENOUS; SOFT TISSUE PRN
Status: DISCONTINUED | OUTPATIENT
Start: 2017-03-15 | End: 2017-03-15

## 2017-03-15 RX ORDER — DEXAMETHASONE SODIUM PHOSPHATE 4 MG/ML
4 INJECTION, SOLUTION INTRA-ARTICULAR; INTRALESIONAL; INTRAMUSCULAR; INTRAVENOUS; SOFT TISSUE EVERY 10 MIN PRN
Status: DISCONTINUED | OUTPATIENT
Start: 2017-03-15 | End: 2017-03-15 | Stop reason: HOSPADM

## 2017-03-15 RX ORDER — HYDROMORPHONE HYDROCHLORIDE 1 MG/ML
.3-.5 INJECTION, SOLUTION INTRAMUSCULAR; INTRAVENOUS; SUBCUTANEOUS EVERY 10 MIN PRN
Status: DISCONTINUED | OUTPATIENT
Start: 2017-03-15 | End: 2017-03-15 | Stop reason: HOSPADM

## 2017-03-15 RX ORDER — LIDOCAINE HYDROCHLORIDE 10 MG/ML
INJECTION, SOLUTION INFILTRATION; PERINEURAL PRN
Status: DISCONTINUED | OUTPATIENT
Start: 2017-03-15 | End: 2017-03-15

## 2017-03-15 RX ORDER — SODIUM CHLORIDE, SODIUM LACTATE, POTASSIUM CHLORIDE, CALCIUM CHLORIDE 600; 310; 30; 20 MG/100ML; MG/100ML; MG/100ML; MG/100ML
INJECTION, SOLUTION INTRAVENOUS CONTINUOUS
Status: DISCONTINUED | OUTPATIENT
Start: 2017-03-15 | End: 2017-03-15 | Stop reason: HOSPADM

## 2017-03-15 RX ORDER — ONDANSETRON 2 MG/ML
INJECTION INTRAMUSCULAR; INTRAVENOUS PRN
Status: DISCONTINUED | OUTPATIENT
Start: 2017-03-15 | End: 2017-03-15

## 2017-03-15 RX ORDER — NALOXONE HYDROCHLORIDE 0.4 MG/ML
.1-.4 INJECTION, SOLUTION INTRAMUSCULAR; INTRAVENOUS; SUBCUTANEOUS
Status: DISCONTINUED | OUTPATIENT
Start: 2017-03-15 | End: 2017-03-15 | Stop reason: HOSPADM

## 2017-03-15 RX ORDER — PROPOFOL 10 MG/ML
INJECTION, EMULSION INTRAVENOUS PRN
Status: DISCONTINUED | OUTPATIENT
Start: 2017-03-15 | End: 2017-03-15

## 2017-03-15 RX ORDER — NEOSTIGMINE METHYLSULFATE 1 MG/ML
VIAL (ML) INJECTION PRN
Status: DISCONTINUED | OUTPATIENT
Start: 2017-03-15 | End: 2017-03-15

## 2017-03-15 RX ADMIN — HYDROMORPHONE HYDROCHLORIDE 1 MG: 1 INJECTION, SOLUTION INTRAMUSCULAR; INTRAVENOUS; SUBCUTANEOUS at 09:26

## 2017-03-15 RX ADMIN — LIDOCAINE HYDROCHLORIDE 1 ML: 10 INJECTION, SOLUTION INFILTRATION; PERINEURAL at 08:42

## 2017-03-15 RX ADMIN — Medication 4 MG: at 09:42

## 2017-03-15 RX ADMIN — FENTANYL CITRATE 150 MCG: 50 INJECTION, SOLUTION INTRAMUSCULAR; INTRAVENOUS at 09:15

## 2017-03-15 RX ADMIN — OXYCODONE HYDROCHLORIDE AND ACETAMINOPHEN 2 TABLET: 5; 325 TABLET ORAL at 10:30

## 2017-03-15 RX ADMIN — ROCURONIUM BROMIDE 40 MG: 10 INJECTION INTRAVENOUS at 09:15

## 2017-03-15 RX ADMIN — FENTANYL CITRATE 50 MCG: 50 INJECTION INTRAMUSCULAR; INTRAVENOUS at 10:02

## 2017-03-15 RX ADMIN — FENTANYL CITRATE 250 MCG: 50 INJECTION, SOLUTION INTRAMUSCULAR; INTRAVENOUS at 09:31

## 2017-03-15 RX ADMIN — PROPOFOL 200 MG: 10 INJECTION, EMULSION INTRAVENOUS at 09:15

## 2017-03-15 RX ADMIN — GLYCOPYRROLATE 0.6 MG: 0.2 INJECTION, SOLUTION INTRAMUSCULAR; INTRAVENOUS at 09:42

## 2017-03-15 RX ADMIN — FENTANYL CITRATE 100 MCG: 50 INJECTION, SOLUTION INTRAMUSCULAR; INTRAVENOUS at 09:12

## 2017-03-15 RX ADMIN — DEXAMETHASONE SODIUM PHOSPHATE 4 MG: 4 INJECTION, SOLUTION INTRAMUSCULAR; INTRAVENOUS at 09:15

## 2017-03-15 RX ADMIN — GLYCOPYRROLATE 0.2 MG: 0.2 INJECTION, SOLUTION INTRAMUSCULAR; INTRAVENOUS at 09:15

## 2017-03-15 RX ADMIN — SODIUM CHLORIDE, POTASSIUM CHLORIDE, SODIUM LACTATE AND CALCIUM CHLORIDE: 600; 310; 30; 20 INJECTION, SOLUTION INTRAVENOUS at 08:43

## 2017-03-15 RX ADMIN — SCOPALAMINE 1 PATCH: 1 PATCH, EXTENDED RELEASE TRANSDERMAL at 09:33

## 2017-03-15 RX ADMIN — MIDAZOLAM HYDROCHLORIDE 2 MG: 1 INJECTION, SOLUTION INTRAMUSCULAR; INTRAVENOUS at 09:11

## 2017-03-15 RX ADMIN — ONDANSETRON 4 MG: 2 INJECTION INTRAMUSCULAR; INTRAVENOUS at 09:34

## 2017-03-15 RX ADMIN — FENTANYL CITRATE 25 MCG: 50 INJECTION INTRAMUSCULAR; INTRAVENOUS at 10:16

## 2017-03-15 RX ADMIN — LIDOCAINE HYDROCHLORIDE 50 MG: 10 INJECTION, SOLUTION INFILTRATION; PERINEURAL at 09:15

## 2017-03-15 NOTE — IP AVS SNAPSHOT
MRN:[id number]                      After Visit Summary   3/15/2017    Chloe Obando    MRN: [id number]           Thank you!     Thank you for choosing [facility] for your care. Our goal is always to provide you with excellent care. Hearing back from our patients is one way we can continue to improve our services. Please take a few minutes to complete the written survey that you may receive in the mail after you visit with us. Thank you!        Patient Information     Date Of Birth          1995        About your hospital stay     You were admitted on:  March 15, 2017 You last received care in the:  [facility] PreOP/Phase II    You were discharged on:  March 15, 2017       Who to Call     For medical emergencies, please call 911.  For non-urgent questions about your medical care, please call your primary care provider or clinic, [phone]  For questions related to your surgery, please call your surgery clinic        Attending Provider     Provider Specialty    Kamini Valdovinos MD Otolaryngology       Primary Care Provider Office Phone # Fax #    Mervat Jemma Schroeder [phone]       [address]   [facility] [address]        After Care Instructions     Diet Instructions       Soft diet for 2 weeks.            Discharge Instructions - Lifting restrictions       Lifting Restrictions 15 pounds until seen at Post-op follow up appointment                  Your next 10 appointments already scheduled     Mar 27, 2017 11:00 AM CDT   Office Visit with Kathy Woodson MD   [facility]    [address] [address] [phone]           Bring a current list of meds and any records pertaining to this visit.  For Physicals, please bring immunization records and any forms needing to be filled out.  Please arrive 10 minutes early to complete paperwork.              Further instructions from  your care team       Postoperative Care for Tonsillectomy (with or without adenoidectomy)    Recovery - There are a handful of issues that routinely occur during recover that should be anticipated during your recovery.    1. The pain and swelling almost always gets worse before it gets better, this is normal.  Usually it peaks 3 to 5 days after the surgery, and then begins improving at 7 to 8 days after surgery.  Of course, this is variable from person to person.  2. The only dietary restriction is avoidance of hard or crunchy things for the first 2 weeks.  If it makes a noise when you bite it, it is too hard.  Although it is good to begin eating again from day one, it is not unusual to not eat for several days after the procedure.  The most important thing is staying hydrated.  Drink fluids with electrolytes if possible, such as dilute sports drinks.  3. If you were sent home with a narcotic pain medication, this can make some people very nauseated.  To minimize this, avoid taking it on an empty stomach, or take smaller does with greater frequency.  For example if your dose is 2 teaspoons every four hours, try taking one teaspoon every two hours, etc. You may also try to take it with food.  4. Try to stay ahead of the pain.  In other words, do not wait for pain medication to completely wear off before taking more pain medicine.  Instead, take the medication every 4 to 6 hours, even if it requires setting an alarm clock at night.  This is especially helpful during the first 5 days. You may also add ibuprofen to help with pain if the prescribed medication is not sufficient.  5. The uvula ( the small hanging object in the back of your mouth) frequently swells up after tonsillectomy, but will go back to normal.  This swelling can temporarily cause the sensation of something being stuck in your throat, it will go away with recovery.  Also, because of the arrangement of nerves under where the tonsils were, sharp ear pain  is very common during recovery, and will also go away with recovery.      Activity - Avoid heavy lifting (greater than 15 pounds), strenuous exercise, or extremely cold environments until the follow up appointment.  Also, try to sleep with your head elevated.  An irritated cough from the breathing tube is fairly normal after surgery.    Medications - Except blood thinners, almost all medication can be re-started after tonsillectomy.      Complications - Bleeding is by far the most common serious complication after tonsillectomy.  If there are a few small drops or streaks of blood in the saliva that then goes away, this can be conservatively watched.  Gentle gargling with the ice water can also help stop this minor bleeding.  However, if the bleeding is persistent, or heavy bleeding occurs, do not hesitate.  Go to the emergency room to be evaluated.    Follow up - I like to see my patient 4 weeks after the procedure to make sure that everything has healed appropriately.  Occasionally, there can be some longer - lasting side effects of surgery such as abnormal tongue sensations, or unusual swallowing.  However, if everything is healed well, the 4 week postoperative visit is all that will be necessary.    If there are any questions or issues with the above, or if there are other issues that concern you, always feel free to call the clinic and I am happy to speak with you as soon as I can.    Kamini Valdovinos MD #[phone]       Otolaryngology  [facility]                        Same Day Surgery Discharge Instructions  Special Precautions After Surgery - Adult    1. It is not unusual to feel lightheaded or faint, up to 24 hours after surgery or while taking pain medication.  If you have these symptoms; sit for a few minutes before standing and have someone assist you when getting up.  2. You should rest and relax for the next 24 hours and must have someone stay with you for at least 24 hours after your  "discharge.  3. DO NOT DRIVE any vehicle or operate mechanical equipment for 24 hours following the end of your surgery.  DO NOT DRIVE while taking narcotic pain medications that have been prescribed by your physician.  If you had a limb operated on, you must be able to use it fully to drive.  4. DO NOT drink alcoholic beverages for 24 hours following surgery or while taking prescription pain medication.  5. Drink clear liquids (apple juice, ginger ale, broth, 7-Up, etc.).  Progress to your regular diet as you feel able.  6. Any questions call your physician and do not make important decisions for 24 hours.      __________________________________________________________________________________________________________________________________  IMPORTANT NUMBERS:    [facility] Main Number:  [phone], [phone]  Pharmacy:  [phone]  Same Day Surgery:  [phone], Monday - Friday until 8:30 p.m.  Urgent Care:  [phone]  Emergency Room:  [phone]                                                                              Surgery Specialty Clinic:  [phone]             Pending Results     Date and Time Order Name Status Description    3/15/2017 0931 Surgical pathology exam In process             Admission Information     Date & Time Provider Department Dept. Phone    3/15/2017 Kamini Valdovinos MD [facility] PreOP/Phase -[phone]      Your Vitals Were     Blood Pressure Pulse Temperature Respirations Height Weight    114/69 79 98.2  F (36.8  C) (Oral) 18 1.549 m (5' 1\") 96.2 kg (212 lb)    Last Period Pulse Oximetry BMI (Body Mass Index)             11/15/2016 (Approximate) 98% 40.06 kg/m2         MyChart Information     [employer] gives you secure access to your electronic health record. If you see a primary care provider, you can also send messages to your care team and make appointments. If you have questions, please call your primary care clinic.  If you do not have a primary care provider, " please call [phone] and they will assist you.        Care EveryWhere ID     This is your Care EveryWhere ID. This could be used by other organizations to access your [place] medical records  [id number]           Review of your medicines      START taking        Dose / Directions    oxyCODONE 5 MG/5ML solution   Commonly known as:  ROXICODONE   Used for:  Chronic tonsillitis        Dose:  10 mg   Take 10 mLs (10 mg) by mouth every 4 hours as needed for moderate to severe pain   Quantity:  473 mL   Refills:  0         CONTINUE these medicines which have NOT CHANGED        Dose / Directions    escitalopram 20 MG tablet   Commonly known as:  LEXAPRO   Used for:  Major depressive disorder, single episode, mild (H), Generalized anxiety disorder        Dose:  20 mg   Take 1 tablet (20 mg) by mouth daily   Quantity:  90 tablet   Refills:  0            Where to get your medicines      Some of these will need a paper prescription and others can be bought over the counter. Ask your nurse if you have questions.     Bring a paper prescription for each of these medications     oxyCODONE 5 MG/5ML solution                Protect others around you: Learn how to safely use, store and throw away your medicines at www.disposemymeds.org.             Medication List: This is a list of all your medications and when to take them. Check marks below indicate your daily home schedule. Keep this list as a reference.      Medications           Morning Afternoon Evening Bedtime As Needed    escitalopram 20 MG tablet   Commonly known as:  LEXAPRO   Take 1 tablet (20 mg) by mouth daily                                oxyCODONE 5 MG/5ML solution   Commonly known as:  ROXICODONE   Take 10 mLs (10 mg) by mouth every 4 hours as needed for moderate to severe pain

## 2017-03-15 NOTE — OP NOTE
PREOPERATIVE DIAGNOSES:   1. Chronic Tonsillitis  POSTOPERATIVE DIAGNOSES:   1. Chronic Tonsillitis   PROCEDURE PERFORMED: Bilateral Tonsillectomy  SURGEON: Kamini Valdovinos MD   BLOOD LOSS: 5 mL.   COMPLICATIONS: None.   SPECIMENS: None.   ANESTHESIA: GETA.   INDICATIONS: Chloe Obando presented to me with chronic sore throats and large tonsils, suggestive of chronic tonsillitis.. I therefore recommended adenotonsillectomy.  OPERATIVE PROCEDURE: After being taken to the operating room and induction of general endotracheal tube anesthesia, the bed was rotated 90 degrees and a shoulder roll and head turban were placed. I suspended the patient from the Roger stand using a Willow-Terry mouthgag, and I grasped the right tonsil with an Allis forceps and retracted medially and performed subcapsular dissection utilizing monopolar cautery, and the right tonsil came out very smoothly. I then turned my attention to the left side, once again using an Allis forceps to grasp it and retract it medially, and then I performed subcapsular dissection, and the left tonsil also came out very smoothly. I released the mouthgag for 2 minutes to allow recirculation of blood to the tongue. I reinspected the tonsil beds and there was good hemostasis. I then removed the mouthgag and the bed was rotated 90 degrees after I removed the shoulder roll and head turban. The patient was awakened, extubated and sent to the recovery room in good condition.     Dr. Kamini Valdovinos  [facility]

## 2017-03-15 NOTE — DISCHARGE INSTRUCTIONS
Postoperative Care for Tonsillectomy (with or without adenoidectomy)    Recovery - There are a handful of issues that routinely occur during recover that should be anticipated during your recovery.    1. The pain and swelling almost always gets worse before it gets better, this is normal.  Usually it peaks 3 to 5 days after the surgery, and then begins improving at 7 to 8 days after surgery.  Of course, this is variable from person to person.  2. The only dietary restriction is avoidance of hard or crunchy things for the first 2 weeks.  If it makes a noise when you bite it, it is too hard.  Although it is good to begin eating again from day one, it is not unusual to not eat for several days after the procedure.  The most important thing is staying hydrated.  Drink fluids with electrolytes if possible, such as dilute sports drinks.  3. If you were sent home with a narcotic pain medication, this can make some people very nauseated.  To minimize this, avoid taking it on an empty stomach, or take smaller does with greater frequency.  For example if your dose is 2 teaspoons every four hours, try taking one teaspoon every two hours, etc. You may also try to take it with food.  4. Try to stay ahead of the pain.  In other words, do not wait for pain medication to completely wear off before taking more pain medicine.  Instead, take the medication every 4 to 6 hours, even if it requires setting an alarm clock at night.  This is especially helpful during the first 5 days. You may also add ibuprofen to help with pain if the prescribed medication is not sufficient.  5. The uvula ( the small hanging object in the back of your mouth) frequently swells up after tonsillectomy, but will go back to normal.  This swelling can temporarily cause the sensation of something being stuck in your throat, it will go away with recovery.  Also, because of the arrangement of nerves under where the tonsils were, sharp ear pain is very common  during recovery, and will also go away with recovery.      Activity - Avoid heavy lifting (greater than 15 pounds), strenuous exercise, or extremely cold environments until the follow up appointment.  Also, try to sleep with your head elevated.  An irritated cough from the breathing tube is fairly normal after surgery.    Medications - Except blood thinners, almost all medication can be re-started after tonsillectomy.      Complications - Bleeding is by far the most common serious complication after tonsillectomy.  If there are a few small drops or streaks of blood in the saliva that then goes away, this can be conservatively watched.  Gentle gargling with the ice water can also help stop this minor bleeding.  However, if the bleeding is persistent, or heavy bleeding occurs, do not hesitate.  Go to the emergency room to be evaluated.    Follow up - I like to see my patient 4 weeks after the procedure to make sure that everything has healed appropriately.  Occasionally, there can be some longer - lasting side effects of surgery such as abnormal tongue sensations, or unusual swallowing.  However, if everything is healed well, the 4 week postoperative visit is all that will be necessary.    If there are any questions or issues with the above, or if there are other issues that concern you, always feel free to call the clinic and I am happy to speak with you as soon as I can.    Kamini Valdovinos MD #[phone]       Otolaryngology  [facility]                        Same Day Surgery Discharge Instructions  Special Precautions After Surgery - Adult    1. It is not unusual to feel lightheaded or faint, up to 24 hours after surgery or while taking pain medication.  If you have these symptoms; sit for a few minutes before standing and have someone assist you when getting up.  2. You should rest and relax for the next 24 hours and must have someone stay with you for at least 24 hours after your discharge.  3. DO  NOT DRIVE any vehicle or operate mechanical equipment for 24 hours following the end of your surgery.  DO NOT DRIVE while taking narcotic pain medications that have been prescribed by your physician.  If you had a limb operated on, you must be able to use it fully to drive.  4. DO NOT drink alcoholic beverages for 24 hours following surgery or while taking prescription pain medication.  5. Drink clear liquids (apple juice, ginger ale, broth, 7-Up, etc.).  Progress to your regular diet as you feel able.  6. Any questions call your physician and do not make important decisions for 24 hours.      __________________________________________________________________________________________________________________________________  IMPORTANT NUMBERS:    [facility] Main Number:  [phone], [phone]  Pharmacy:  [phone]  Same Day Surgery:  [phone], Monday - Friday until 8:30 p.m.  Urgent Care:  [phone]  Emergency Room:  [phone]                                                                              [facility]:  [phone]

## 2017-03-15 NOTE — ANESTHESIA CARE TRANSFER NOTE
Patient: Chloe Obando    Procedure(s):  Bilateral Tonsillectomy - Wound Class: II-Clean Contaminated    Diagnosis: recurrent tonsillitis  Diagnosis Additional Information: No value filed.    Anesthesia Type:   General, ETT     Note:  Airway :Blow-by  Patient transferred to:PACU        Vitals: (Last set prior to Anesthesia Care Transfer)    CRNA VITALS  3/15/2017 0918 - 3/15/2017 0956      3/15/2017             Pulse: 116    SpO2: 100 %    Resp Rate (observed): (!)  1                Electronically Signed By: ANU Bowie CRNA  March 15, 2017  9:56 AM

## 2017-03-15 NOTE — H&P (VIEW-ONLY)
[facility]  [address] [address]  [phone]  Dept: [phone]    PRE-OP EVALUATION:  Today's date: 3/9/2017    Chloe Obando (: 1995) presents for pre-operative evaluation assessment as requested by Dr. Valdovinos.  She requires evaluation and anesthesia risk assessment prior to undergoing surgery/procedure for treatment of chronic strep throat and pharyngitis .  Proposed procedure: tonsillectomy    Date of Surgery/ Procedure: 3/15/17  Time of Surgery/ Procedure: 8:55 am per [facility]/Surgical Facility: [facility]  Primary Physician: Mervat Schroeder  Type of Anesthesia Anticipated: General    Patient has a Health Care Directive or Living Will:  NO    1. NO - Do you have a history of heart attack, stroke, stent, bypass or surgery on an artery in the head, neck, heart or legs?  2. NO - Do you ever have any pain or discomfort in your chest?  3. NO - Do you have a history of  Heart Failure?  4. NO - Are you troubled by shortness of breath when: walking on the level, up a slight hill or at night?  5. NO - Do you currently have a cold, bronchitis or other respiratory infection?  6. NO - Do you have a cough, shortness of breath or wheezing?  7. NO - Do you sometimes get pains in the calves of your legs when you walk?  8. NO - Do you or anyone in your family have previous history of blood clots?  9. YES- Do you or does anyone in your family have a serious bleeding problem such as prolonged bleeding following surgeries or cuts? Grandfather takes Coumadin  10. NO - Have you ever had problems with anemia or been told to take iron pills?  11. NO - Have you had any abnormal blood loss such as black, tarry or bloody stools, or abnormal vaginal bleeding?  12. NO - Have you ever had a blood transfusion?  13. NO - Have you or any of your relatives ever had problems with anesthesia?  14. NO - Do you have sleep apnea, excessive snoring or daytime drowsiness?  15. NO - Do you  have any prosthetic heart valves?  16. NO - Do you have prosthetic joints?  17. NO - Is there any chance that you may be pregnant?      HPI:                                                      Brief HPI related to upcoming procedure: Here today for pre-op for tonsil removal on Wed 3/18/17. No previous problems with anesthesia. No family history of anesthesia problems.   Is sexually active. Had been on pills in the past. Is currently sexually active. Does use condoms. Does not get periods. Last period was over a year ago. Has not been taking Lexapro.     See problem list for active medical problems.  Problems all longstanding and stable, except as noted/documented.  See ROS for pertinent symptoms related to these conditions.                                                                                                  .    MEDICAL HISTORY:                                                      Patient Active Problem List    Diagnosis Date Noted     Generalized anxiety disorder 11/24/2015     Priority: Medium     Major depressive disorder, single episode, mild (H) 11/24/2015     Priority: Medium     panic attack 05/28/2015     Priority: Medium     Obesity 11/21/2013     Priority: Medium     GERD (gastroesophageal reflux disease) 12/11/2012     Priority: Medium     11/2012: Trial of Prilosec for 8 weeks.       Plantar warts 02/10/2011     Priority: Medium     Other acne 11/05/2007     Priority: Medium     05/07/09: trial of Minocycline 100 mg qd,  Retin-A 0.025% qHS.  06/2011: Off meds for 2 years.  Restart minocycline 100 mg qd and Benzyl peroxide qd.  01/2012: Increase minocycline 100 mg to BID, add on retin-A 0.025%, refer to dermatology.       Irregular menstrual cycle 07/19/2007     Priority: Medium     07/07: skipping months. Likely secondary to immature hypothalamic/pituitary/ovarian axis.       HYPERLIPIDEMIA 10/05/2006     Priority: Medium     Noted in transferred clinic records.  Recommend repeat  fasting.  05/07/09:  Lipids improved.  Now within normal range.        Past Medical History   Diagnosis Date     Closed fracture of unspecified part of humerus      Left Humerus Fracture     Hematuria as child     Urinary tract infection, site not specified as child     nl renal U/S, no VCUG     History reviewed. No pertinent past surgical history.  Current Outpatient Prescriptions   Medication Sig Dispense Refill     escitalopram (LEXAPRO) 20 MG tablet Take 1 tablet (20 mg) by mouth daily (Patient not taking: Reported on 3/9/2017) 90 tablet 0     OTC products: None, except as noted above    Allergies   Allergen Reactions     Hazelnuts [Nuts] Rash      Latex Allergy: NO    Social History   Substance Use Topics     Smoking status: Never Smoker     Smokeless tobacco: Never Used      Comment: parents don't smoke in the house     Alcohol use No     History   Drug Use No       REVIEW OF SYSTEMS:                                                    Review of Systems   Constitutional: Negative for activity change and fatigue.   HENT: Negative for ear pain, rhinorrhea and sore throat.    Respiratory: Negative for cough, chest tightness, shortness of breath and wheezing.    Cardiovascular: Negative for chest pain, palpitations and leg swelling.   Gastrointestinal: Negative for abdominal distention, abdominal pain, constipation, diarrhea, nausea and vomiting.   Endocrine: Negative for cold intolerance, heat intolerance, polydipsia, polyphagia and polyuria.   Genitourinary: Negative for difficulty urinating, enuresis, frequency and urgency.   Musculoskeletal: Negative for arthralgias.   Skin: Negative for rash.   Neurological: Negative for dizziness, light-headedness, numbness and headaches.   Psychiatric/Behavioral: Negative for dysphoric mood and sleep disturbance. The patient is not nervous/anxious.          EXAM:                                                    /60 (BP Location: Right arm, Patient Position: Chair,  Cuff Size: Adult Large)  Pulse 76  Temp 97.6  F (36.4  C) (Tympanic)  Wt 212 lb 6.4 oz (96.3 kg)  BMI 40.13 kg/m2  Physical Exam   Constitutional: She appears well-developed and well-nourished.   HENT:   Head: Normocephalic and atraumatic.   Right Ear: Tympanic membrane and external ear normal. No middle ear effusion.   Left Ear: Tympanic membrane and external ear normal.  No middle ear effusion.   Nose: No mucosal edema.   Mouth/Throat: Uvula is midline, oropharynx is clear and moist and mucous membranes are normal.   Eyes: Pupils are equal, round, and reactive to light.   Neck: Normal range of motion. Carotid bruit is not present. No thyromegaly present.   Cardiovascular: Normal rate, regular rhythm and normal heart sounds.    Pulses:       Femoral pulses are 2+ on the right side, and 2+ on the left side.  Pulmonary/Chest: Effort normal and breath sounds normal.   Abdominal: Soft. Normal appearance and bowel sounds are normal. There is no tenderness.   Musculoskeletal: Normal range of motion.   Neurological: She is alert.   Skin: Skin is warm and dry. No rash noted.   Psychiatric: She has a normal mood and affect. Her behavior is normal.       DIAGNOSTICS:                                                    EKG: Not indicated due to non-vascular surgery and low risk of event (age <65 and without cardiac risk factors)    Recent Labs   Lab Test  04/26/16   1120  07/22/15   1046  09/04/14   1010   HGB   --   12.6  13.1   PLT   --   339  328   NA  138  137  138   POTASSIUM  4.0  3.8  4.1   CR  0.60  0.56  0.56   A1C   --   5.7   --         IMPRESSION:                                                        The proposed surgical procedure is considered LOW risk.    REVISED CARDIAC RISK INDEX  The patient has the following serious cardiovascular risks for perioperative complications such as (MI, PE, VFib and 3  AV Block):  No serious cardiac risks  INTERPRETATION: 0 risks: Class I (very low risk - 0.4% complication  rate)    The patient has the following additional risks for perioperative complications:  No identified additional risks      ICD-10-CM    1. Preop general physical exam Z01.818        RECOMMENDATIONS:                                                        APPROVAL GIVEN to proceed with proposed procedure, pending result of lab       Signed Electronically by: ANU Orozco CNP    Copy of this evaluation report is provided to requesting physician.    [facility] Preop Guidelines

## 2017-03-15 NOTE — IP AVS SNAPSHOT
[facility] PreOP/Phase II    [address] [address]    Phone:  [phone]    Fax:  [phone]                                       After Visit Summary   3/15/2017    Chloe Obando    MRN: [id number]           After Visit Summary Signature Page     I have received my discharge instructions, and my questions have been answered. I have discussed any challenges I see with this plan with the nurse or doctor.    ..........................................................................................................................................  Patient/Patient Representative Signature      ..........................................................................................................................................  Patient Representative Print Name and Relationship to Patient    ..................................................               ................................................  Date                                            Time    ..........................................................................................................................................  Reviewed by Signature/Title    ...................................................              ..............................................  Date                                                            Time

## 2017-03-15 NOTE — ANESTHESIA PREPROCEDURE EVALUATION
Anesthesia Evaluation     . Pt has had prior anesthetic.       ROS/MED HX    ENT/Pulmonary: Comment: Recurrent tonsillitis    (+)MACIEL risk factors obese, , . .    Neurologic:  - neg neurologic ROS     Cardiovascular:     (+) Dyslipidemia, ----. : . . . :. . Previous cardiac testing date:results:date: results:ECG reviewed date:1/20/14 results:SR date: results:          METS/Exercise Tolerance:  >4 METS   Hematologic:  - neg hematologic  ROS       Musculoskeletal:  - neg musculoskeletal ROS       GI/Hepatic:     (+) GERD       Renal/Genitourinary: Comment: Hx of UTI, hematuria        Endo:     (+) Obesity, .      Psychiatric:     (+) psychiatric history anxiety, other (comment) and depression (panic attacks)      Infectious Disease:  - neg infectious disease ROS       Malignancy:      - no malignancy   Other: Comment: Plantar warts, acne, irregular menstrual cycle              Physical Exam  Normal systems: cardiovascular, pulmonary and dental    Airway   Mallampati: II  TM distance: >3 FB  Neck ROM: full    Dental     Cardiovascular       Pulmonary                     Anesthesia Plan      History & Physical Review  History and physical reviewed and following examination; no interval change.    ASA Status:  3 .    NPO Status:  > 8 hours    Plan for General and ETT with Propofol and Intravenous induction. Maintenance will be Balanced.    PONV prophylaxis:  Ondansetron (or other 5HT-3) and Dexamethasone or Solumedrol  Additional equipment: Videolaryngoscope      Postoperative Care  Postoperative pain management:  IV analgesics.      Consents  Anesthetic plan, risks, benefits and alternatives discussed with:  Patient..                          .

## 2017-03-16 LAB — COPATH REPORT: NORMAL

## 2017-03-20 ENCOUNTER — TELEPHONE (OUTPATIENT)
Dept: OTOLARYNGOLOGY | Facility: CLINIC | Age: 22
End: 2017-03-20

## 2017-03-20 NOTE — TELEPHONE ENCOUNTER
Reason for Call:  Other ears    Detailed comments: Mom states pt is having a lot of pain in her ears post surgery, is that normal? Please call     Phone Number Patient can be reached at: [phone]  Best Time: today    Can we leave a detailed message on this number? unknown    Call taken on 3/20/2017 at 8:44 AM by Carmencita Alcala

## 2017-03-20 NOTE — TELEPHONE ENCOUNTER
Spoke to mother and discussed providers note below.  Mother verbalized understanding. Advised mother to shaylee if she had further questions or concerns.  Zoya HERNANDEZ RN BSN PHN  Specialty Clinics

## 2017-03-20 NOTE — TELEPHONE ENCOUNTER
Ok to take tylenol and ibuprofen for pain postop. All narcotics are likely to cause nausea in susceptible individuals, so I'm not sure it would make much difference to switch narcotics. I recommend taking only very small doses of the narcotic in order to reduce the chance for nausea. She should really focus on staying hydrated, and if she is vomiting frequently and drinking infrequently, she may need to come to the ER for IV fluids.    Dr. Valdovinos

## 2017-03-20 NOTE — TELEPHONE ENCOUNTER
Spoke to mother who reported that the pt is having post op pain, especially sharp pain in her ears. Mother stated pt tried to take the percocet post op and started vomiting. Mother reported that pt has refused to take the pain med anymore and has been trying to get through with the tylenol.  Mom stated she did give her some ibuprofen, but knows that she should not have this anymore. Mother stated that the pt has not been sleeping and was only drinking water, which was giving her heartburn and then small amounts of emesis. Mother reported that she did get her Gatorade and more popsicles now, which the pt has been starting to try this morning. Pt's surgery was on 3/15/17. Mom is wondering if there is something different we can try for pain instead of percocet. Mom stated pt has a very sensitive stomach. Please advise.  Zoya HERNANDEZ RN BSN PHN  Specialty Clinics

## 2017-03-27 ENCOUNTER — OFFICE VISIT (OUTPATIENT)
Dept: OBGYN | Facility: CLINIC | Age: 22
End: 2017-03-27
Payer: COMMERCIAL

## 2017-03-27 VITALS
SYSTOLIC BLOOD PRESSURE: 115 MMHG | HEART RATE: 82 BPM | WEIGHT: 206.2 LBS | DIASTOLIC BLOOD PRESSURE: 78 MMHG | BODY MASS INDEX: 38.93 KG/M2 | HEIGHT: 61 IN

## 2017-03-27 DIAGNOSIS — N91.2 ABSENCE OF MENSTRUATION: Primary | ICD-10-CM

## 2017-03-27 PROCEDURE — 99203 OFFICE O/P NEW LOW 30 MIN: CPT | Performed by: OBSTETRICS & GYNECOLOGY

## 2017-03-27 NOTE — PATIENT INSTRUCTIONS
1.  Hormones initial were normal  2. Pelvic US-first  3.  Once the US is done-start the ocps.  4.  F/u up after the pills would be complete

## 2017-03-27 NOTE — MR AVS SNAPSHOT
After Visit Summary   3/27/2017    Chloe Obando    MRN: [id number]           Patient Information     Date Of Birth          1995        Visit Information        Provider Department      3/27/2017 11:00 AM Kathy Woodson MD [facility]        Today's Diagnoses     Absence of menstruation    -  1      Care Instructions    1.  Hormones initial were normal  2. Pelvic US-first  3.  Once the US is done-start the ocps.  4.  F/u up after the pills would be complete        Follow-ups after your visit        Future tests that were ordered for you today     Open Future Orders        Priority Expected Expires Ordered    US Pelvic Complete w Transvaginal Routine 6/25/2017 3/27/2018 3/27/2017            Who to contact     If you have questions or need follow up information about today's clinic visit or your schedule please contact [facility] directly at [phone].  Normal or non-critical lab and imaging results will be communicated to you by MyChart, letter or phone within 4 business days after the clinic has received the results. If you do not hear from us within 7 days, please contact the clinic through [employer] or phone. If you have a critical or abnormal lab result, we will notify you by phone as soon as possible.  Submit refill requests through On The Bill or call your pharmacy and they will forward the refill request to us. Please allow 3 business days for your refill to be completed.          Additional Information About Your Visit        MyChart Information     On The Bill gives you secure access to your electronic health record. If you see a primary care provider, you can also send messages to your care team and make appointments. If you have questions, please call your primary care clinic.  If you do not have a primary care provider, please call [phone] and they will assist you.        Care EveryWhere ID     This is your Care EveryWhere ID. This could be used  "by other organizations to access your [facility] medical records  [id number]        Your Vitals Were     Pulse Height Last Period BMI (Body Mass Index)          82 5' 1\" (1.549 m) 11/15/2016 (Approximate) 38.96 kg/m2         Blood Pressure from Last 3 Encounters:   03/27/17 115/78   03/15/17 114/68   03/09/17 122/60    Weight from Last 3 Encounters:   03/27/17 206 lb 3.2 oz (93.5 kg)   03/15/17 212 lb (96.2 kg)   03/09/17 212 lb 6.4 oz (96.3 kg)                 Today's Medication Changes          These changes are accurate as of: 3/27/17 11:17 AM.  If you have any questions, ask your nurse or doctor.               Start taking these medicines.        Dose/Directions    norethindrone-ethinyl estradiol 1-35 MG-MCG per tablet   Commonly known as:  ORTHO-NOVUM 1-35 TAB,NORTREL 1-35 TAB   Used for:  Absence of menstruation   Started by:  Kathy Woodson MD        Dose:  1 tablet   Take 1 tablet by mouth daily   Quantity:  84 tablet   Refills:  3            Where to get your medicines      These medications were sent to [facility] [address] [facility]  [address] [place] [address]     Phone:  [phone]     norethindrone-ethinyl estradiol 1-35 MG-MCG per tablet                Primary Care Provider Office Phone # Fax #    Mervat Jemma Schroeder [phone]       [address] [facility] [address]        Thank you!     Thank you for choosing [facility]  for your care. Our goal is always to provide you with excellent care. Hearing back from our patients is one way we can continue to improve our services. Please take a few minutes to complete the written survey that you may receive in the mail after your visit with us. Thank you!             Your Updated Medication List - Protect others around you: Learn how to safely use, store and throw away your medicines at www.disposemymeds.org.          This list is accurate as of: " 3/27/17 11:17 AM.  Always use your most recent med list.                   Brand Name Dispense Instructions for use    escitalopram 20 MG tablet    LEXAPRO    90 tablet    Take 1 tablet (20 mg) by mouth daily       norethindrone-ethinyl estradiol 1-35 MG-MCG per tablet    ORTHO-NOVUM 1-35 TAB,NORTREL 1-35 TAB    84 tablet    Take 1 tablet by mouth daily       oxyCODONE 5 MG/5ML solution    ROXICODONE    473 mL    Take 10 mLs (10 mg) by mouth every 4 hours as needed for moderate to severe pain

## 2017-03-27 NOTE — PROGRESS NOTES
"21 year old with no cycles.  She started with menarche 5th grade.  She had normal cycles and didn't have cycles.  She started ocps age 16 and had normal cycles for a year.  Then no cycles after that but she had spotting here and there.  She was on the ocps for acne and she took acutane in her jack year of high school.  She was told to stop ocps last august-6 months ago.  She is using condoms for contraception.  She has spotting on and off but no real menstrual cycle.  Last year she did a progesterone withdrawal bleed and didn't have a cycle after it.  She is not interested in getting pregnant right now.  She is using condoms.  No pain issues.    Lab Results   Component Value Date    PAP NIL 08/29/2016     Past Medical History:   Diagnosis Date     Closed fracture of unspecified part of humerus     Left Humerus Fracture     Hematuria as child     Urinary tract infection, site not specified as child    nl renal U/S, no VCUG     Past Surgical History:   Procedure Laterality Date     TONSILLECTOMY ADULT Bilateral 3/15/2017    Procedure: TONSILLECTOMY ADULT;  Surgeon: Kamini Valdovinos MD;  Location: [facility] OR     [facility]-reviewed   ROS: 10 point ROS neg other than the symptoms noted above in the HPI.  /78  Pulse 82  Ht 5' 1\" (1.549 m)  Wt 206 lb 3.2 oz (93.5 kg)  LMP 11/15/2016 (Approximate)  BMI 38.96 kg/m2  Alert and orientedx3, in NAD  Exam deferred-normal exam with pap and exam  ASSESSMENT / PLAN:  (N91.2) Absence of menstruation  (primary encounter diagnosis)  Comment: probably due to ocps, however could be PCOS, labs done with primary were all normal-fsh/tsh/prolactin/dheas. And recent pregnancy test negative due to surgery.  Plan: US Pelvic Complete w Transvaginal,         norethindrone-ethinyl estradiol (ORTHO-NOVUM         1-35 TAB,NORTREL 1-35 TAB) 1-35 MG-MCG per         Tablet  Differential discussed.  Reviewed prior note          1.  Hormones initial were normal  2. Pelvic US-first  3.  Once the US is " done-start the ocps.  4.  F/u up after the pills would be complete  30 minutes was spent face to face with the patient today discussing her history, diagnosis, and follow-up plan as noted above.  Over 50% of the visit was spent in counseling and coordination of care.    Total Visit Time: 30 minutes.      Kathy Woodson MD

## 2017-03-28 ENCOUNTER — RECORDS - HEALTHEAST (OUTPATIENT)
Dept: LAB | Facility: HOSPITAL | Age: 22
End: 2017-03-28

## 2017-03-28 ENCOUNTER — HOSPITAL ENCOUNTER (OUTPATIENT)
Dept: ULTRASOUND IMAGING | Facility: CLINIC | Age: 22
Discharge: HOME | End: 2017-03-28
Attending: OBSTETRICS & GYNECOLOGY | Admitting: OBSTETRICS & GYNECOLOGY
Payer: COMMERCIAL

## 2017-03-28 DIAGNOSIS — N91.2 ABSENCE OF MENSTRUATION: ICD-10-CM

## 2017-03-28 PROCEDURE — 76830 TRANSVAGINAL US NON-OB: CPT

## 2017-03-29 PROBLEM — E28.2 PCOS (POLYCYSTIC OVARIAN SYNDROME): Status: ACTIVE | Noted: 2017-03-29

## 2017-03-29 LAB — HBV SURFACE AB SERPL IA-ACNC: NEGATIVE M[IU]/ML

## 2017-04-13 ENCOUNTER — OFFICE VISIT (OUTPATIENT)
Dept: OTOLARYNGOLOGY | Facility: CLINIC | Age: 22
End: 2017-04-13
Payer: COMMERCIAL

## 2017-04-13 VITALS
BODY MASS INDEX: 39.08 KG/M2 | DIASTOLIC BLOOD PRESSURE: 71 MMHG | SYSTOLIC BLOOD PRESSURE: 117 MMHG | HEIGHT: 61 IN | HEART RATE: 81 BPM | WEIGHT: 207 LBS | TEMPERATURE: 98.2 F

## 2017-04-13 DIAGNOSIS — J35.01 CHRONIC TONSILLITIS: Primary | ICD-10-CM

## 2017-04-13 PROCEDURE — 99024 POSTOP FOLLOW-UP VISIT: CPT | Performed by: OTOLARYNGOLOGY

## 2017-04-13 ASSESSMENT — PAIN SCALES - GENERAL: PAINLEVEL: NO PAIN (0)

## 2017-04-13 NOTE — MR AVS SNAPSHOT
After Visit Summary   4/13/2017    Chloe Obando    MRN: [id number]           Patient Information     Date Of Birth          1995        Visit Information        Provider Department      4/13/2017 3:45 PM Kamini Valdovinos MD [facility]        Today's Diagnoses     Chronic tonsillitis    -  1      Care Instructions    Per Physician's instructions          Follow-ups after your visit        Your next 10 appointments already scheduled     May 24, 2017  4:00 PM CDT   Office Visit with Kathy Woodson MD   [facility] ([facility]    [address] [facility] [address]   [phone]           Bring a current list of meds and any records pertaining to this visit.  For Physicals, please bring immunization records and any forms needing to be filled out.  Please arrive 10 minutes early to complete paperwork.              Who to contact     If you have questions or need follow up information about today's clinic visit or your schedule please contact [facility] directly at [phone].  Normal or non-critical lab and imaging results will be communicated to you by MyChart, letter or phone within 4 business days after the clinic has received the results. If you do not hear from us within 7 days, please contact the clinic through [employer] or phone. If you have a critical or abnormal lab result, we will notify you by phone as soon as possible.  Submit refill requests through [employer]osly or call your pharmacy and they will forward the refill request to us. Please allow 3 business days for your refill to be completed.          Additional Information About Your Visit        MyChart Information     [employer] gives you secure access to your electronic health record. If you see a primary care provider, you can also send messages to your care team and make appointments. If you have questions, please call your primary care clinic.  If you do not  "have a primary care provider, please call [phone] and they will assist you.        Care EveryWhere ID     This is your Care EveryWhere ID. This could be used by other organizations to access your [facility] medical records  [id number]        Your Vitals Were     Pulse Temperature Height Last Period BMI (Body Mass Index)       81 98.2  F (36.8  C) (Oral) 1.549 m (5' 1\") 11/15/2016 (Approximate) 39.11 kg/m2        Blood Pressure from Last 3 Encounters:   04/13/17 117/71   03/27/17 115/78   03/15/17 114/68    Weight from Last 3 Encounters:   04/13/17 93.9 kg (207 lb)   03/27/17 93.5 kg (206 lb 3.2 oz)   03/15/17 96.2 kg (212 lb)              Today, you had the following     No orders found for display         Today's Medication Changes          These changes are accurate as of: 4/13/17  4:17 PM.  If you have any questions, ask your nurse or doctor.               Stop taking these medicines if you haven't already. Please contact your care team if you have questions.     oxyCODONE 5 MG/5ML solution   Commonly known as:  ROXICODONE   Stopped by:  Kamini Valdovinos MD                    Primary Care Provider Office Phone # Fax #    Mervat Easton DO Alexandre [phone]       [address]   [facility] [address]        Thank you!     Thank you for choosing [facility]  for your care. Our goal is always to provide you with excellent care. Hearing back from our patients is one way we can continue to improve our services. Please take a few minutes to complete the written survey that you may receive in the mail after your visit with us. Thank you!             Your Updated Medication List - Protect others around you: Learn how to safely use, store and throw away your medicines at www.disposemymeds.org.          This list is accurate as of: 4/13/17  4:17 PM.  Always use your most recent med list.                   Brand Name Dispense Instructions for use    escitalopram 20 MG tablet    " LEXAPRO    90 tablet    Take 1 tablet (20 mg) by mouth daily       norethindrone-ethinyl estradiol 1-35 MG-MCG per tablet    ORTHO-NOVUM 1-35 TAB,NORTREL 1-35 TAB    84 tablet    Take 1 tablet by mouth daily

## 2017-04-13 NOTE — PROGRESS NOTES
"History of Present Illness - Chloe Obando is a 21 year old female who is status post tonsillectomy on 3/15/2017.  There was the expected amount of discomfort in the postoperative period. She actually became very nauseated from the narcotic medication, and therefore just took ibuprofen for her pain, which lasted about 10 days. She was back to work in 1 week. No bleeding episodes.    /71 (BP Location: Right arm, Patient Position: Chair, Cuff Size: Adult Regular)  Pulse 81  Temp 98.2  F (36.8  C) (Oral)  Ht 1.549 m (5' 1\")  Wt 93.9 kg (207 lb)  LMP 11/15/2016 (Approximate)  BMI 39.11 kg/m2    General - The patient is well nourished and well developed, and appears to have good nutritional status.  Alert and oriented to person and place, answers questions and cooperates with examination appropriately.   Head and Face - Normocephalic and atraumatic, with no gross asymmetry noted of the contour of the facial features.  The facial nerve is intact, with strong symmetric movements.  Eyes - Extraocular movements intact, and the pupils were reactive to light.  Sclera were not icteric or injected, conjunctiva were pink and moist.  Neck - Normal midline excursion of the laryngotracheal complex during swallowing.  Full range of motion on passive movement.  Palpation of the occipital, submental, submandibular, internal jugular chain, and supraclavicular nodes did not demonstrate any abnormal lymph nodes or masses.  The carotid pulse was palpable bilaterally.  Palpation of the thyroid was soft and smooth, with no nodules or goiter appreciated.  The trachea was mobile and midline.  Mouth - Examination of the oral cavity shows pink, healthy, moist mucosa.  No lesions or ulceration noted.  The dentition are in good repair.  The tongue is mobile and midline.  Oropharynx - The tonsil beds are remucosalizing appropriately.  No signs of bleeding or clots.  The Uvula is midline and the soft palate is symmetric.     A/P - Chloe " Topher has had an uncomplicated tonsillectomy.  They have no restrictions at this point and can return on an as needed basis. I have provided a return to work letter.

## 2017-04-13 NOTE — NURSING NOTE
"Initial /71 (BP Location: Right arm, Patient Position: Chair, Cuff Size: Adult Regular)  Pulse 81  Temp 98.2  F (36.8  C) (Oral)  Ht 1.549 m (5' 1\")  Wt 93.9 kg (207 lb)  LMP 11/15/2016 (Approximate)  BMI 39.11 kg/m2 Estimated body mass index is 39.11 kg/(m^2) as calculated from the following:    Height as of this encounter: 1.549 m (5' 1\").    Weight as of this encounter: 93.9 kg (207 lb). .      Nicky Rice CMA    "

## 2017-04-13 NOTE — LETTER
[facility]  [address] [facility] [address]  Phone: [phone]    April 13, 2017        Chloe Obando  [address] [facility] [address]          To whom it may concern:    RE: Chloe Obando    Patient may return to work with the following:  No working or lifting restrictions    Please contact me for questions or concerns.      Sincerely,        Kamini Valdovinos MD

## 2017-05-20 ENCOUNTER — HOSPITAL ENCOUNTER (EMERGENCY)
Facility: CLINIC | Age: 22
Discharge: HOME | End: 2017-05-20
Attending: NURSE PRACTITIONER | Admitting: NURSE PRACTITIONER
Payer: COMMERCIAL

## 2017-05-20 VITALS
DIASTOLIC BLOOD PRESSURE: 92 MMHG | WEIGHT: 210 LBS | SYSTOLIC BLOOD PRESSURE: 142 MMHG | TEMPERATURE: 98.4 F | BODY MASS INDEX: 39.68 KG/M2 | OXYGEN SATURATION: 99 %

## 2017-05-20 DIAGNOSIS — N30.01 ACUTE CYSTITIS WITH HEMATURIA: ICD-10-CM

## 2017-05-20 LAB
ALBUMIN UR-MCNC: 30 MG/DL
APPEARANCE UR: ABNORMAL
BILIRUB UR QL STRIP: NEGATIVE
COLOR UR AUTO: YELLOW
GLUCOSE UR STRIP-MCNC: NEGATIVE MG/DL
HCG UR QL: NEGATIVE
HGB UR QL STRIP: ABNORMAL
KETONES UR STRIP-MCNC: NEGATIVE MG/DL
LEUKOCYTE ESTERASE UR QL STRIP: ABNORMAL
MUCOUS THREADS #/AREA URNS LPF: PRESENT /LPF
NITRATE UR QL: NEGATIVE
PH UR STRIP: 8 PH (ref 5–7)
RBC #/AREA URNS AUTO: 19 /HPF (ref 0–2)
SP GR UR STRIP: 1.01 (ref 1–1.03)
SQUAMOUS #/AREA URNS AUTO: 12 /HPF (ref 0–1)
URN SPEC COLLECT METH UR: ABNORMAL
UROBILINOGEN UR STRIP-MCNC: NORMAL MG/DL (ref 0–2)
WBC #/AREA URNS AUTO: 81 /HPF (ref 0–2)
WBC CLUMPS #/AREA URNS HPF: PRESENT /HPF

## 2017-05-20 PROCEDURE — 87088 URINE BACTERIA CULTURE: CPT | Performed by: NURSE PRACTITIONER

## 2017-05-20 PROCEDURE — 99213 OFFICE O/P EST LOW 20 MIN: CPT

## 2017-05-20 PROCEDURE — 81025 URINE PREGNANCY TEST: CPT | Performed by: NURSE PRACTITIONER

## 2017-05-20 PROCEDURE — 87186 SC STD MICRODIL/AGAR DIL: CPT | Performed by: NURSE PRACTITIONER

## 2017-05-20 PROCEDURE — 81001 URINALYSIS AUTO W/SCOPE: CPT | Performed by: NURSE PRACTITIONER

## 2017-05-20 PROCEDURE — 99213 OFFICE O/P EST LOW 20 MIN: CPT | Performed by: NURSE PRACTITIONER

## 2017-05-20 PROCEDURE — 87086 URINE CULTURE/COLONY COUNT: CPT | Performed by: NURSE PRACTITIONER

## 2017-05-20 RX ORDER — SULFAMETHOXAZOLE/TRIMETHOPRIM 800-160 MG
1 TABLET ORAL 2 TIMES DAILY
Qty: 6 TABLET | Refills: 0 | Status: SHIPPED | OUTPATIENT
Start: 2017-05-20 | End: 2017-05-23

## 2017-05-20 NOTE — ED PROVIDER NOTES
History     Chief Complaint   Patient presents with     UTI     back pain, frequancy, dysuria      HPI  Chloe Obando is a 21 year old female who presents for evaluation of urinary frequency, urgency, and dysuria.  Started 3 weeks ago, however the dysuria started yesterday.  Denies nausea or vomiting.  Denies hematuria.  Reports foul smell to her urine.  No history of frequent UTIs.  Denies history of kidney stones.  She is sexually active with her boyfriend, this is not a new partner.  She uses condoms and is on oral birth control.  She was due for her menstrual cycle 10 days ago.  No history of irregular menses.  Denies vaginal bleeding or discharge.  Denies vaginal pain.      Patient Active Problem List   Diagnosis     HYPERLIPIDEMIA     Irregular menstrual cycle     Other acne     Plantar warts     GERD (gastroesophageal reflux disease)     Obesity     panic attack     Generalized anxiety disorder     Major depressive disorder, single episode, mild (H)     PCOS (polycystic ovarian syndrome)       No current facility-administered medications on file prior to encounter.   Current Outpatient Prescriptions on File Prior to Encounter:  norethindrone-ethinyl estradiol (ORTHO-NOVUM 1-35 TAB,NORTREL 1-35 TAB) 1-35 MG-MCG per tablet Take 1 tablet by mouth daily   escitalopram (LEXAPRO) 20 MG tablet Take 1 tablet (20 mg) by mouth daily (Patient not taking: Reported on 3/27/2017)     Past Surgical History:   Procedure Laterality Date     TONSILLECTOMY ADULT Bilateral 3/15/2017    Procedure: TONSILLECTOMY ADULT;  Surgeon: Kamini Valdovinos MD;  Location: [place] OR       I have reviewed the Medications, Allergies, Past Medical and Surgical History, and Social History in the Epic system.    Review of Systems  As mentioned above in the history present illness. All other systems were reviewed and are negative.    Physical Exam   BP: (!) 142/92  Heart Rate: 95  Temp: 98.4  F (36.9  C)  Weight: 95.3 kg (210 lb)  SpO2: 99 %  Physical  Exam    GENERAL APPEARANCE: healthy, alert and no distress  RESP: lungs clear to auscultation - no rales, rhonchi or wheezes  CV: regular rates and rhythm, normal S1 S2, no murmur noted  ABDOMEN:  soft, nontender, no HSM or masses and bowel sounds normal. Right CVA tenderness.    ED Course     ED Course     Procedures     Results for orders placed or performed during the hospital encounter of 05/20/17 (from the past 24 hour(s))   UA with Microscopic reflex to Culture   Result Value Ref Range    Color Urine Yellow     Appearance Urine Slightly Cloudy     Glucose Urine Negative NEG mg/dL    Bilirubin Urine Negative NEG    Ketones Urine Negative NEG mg/dL    Specific Gravity Urine 1.009 1.003 - 1.035    Blood Urine Small (A) NEG    pH Urine 8.0 (H) 5.0 - 7.0 pH    Protein Albumin Urine 30 (A) NEG mg/dL    Urobilinogen mg/dL Normal 0.0 - 2.0 mg/dL    Nitrite Urine Negative NEG    Leukocyte Esterase Urine Large (A) NEG    Source Unspecified Urine     WBC Urine 81 (H) 0 - 2 /HPF    RBC Urine 19 (H) 0 - 2 /HPF    WBC Clumps Present (A) NEG /HPF    Squamous Epithelial /HPF Urine 12 (H) 0 - 1 /HPF    Mucous Urine Present (A) NEG /LPF   HCG qualitative urine   Result Value Ref Range    HCG Qual Urine Negative NEG       Assessments & Plan (with Medical Decision Making)   DDx:  cystitis, pyelonephritis, urethritis, nephrolithiasis, vaginitis, yeast infection, PID, STI    Urinalysis positive for infection.  No signs or symptoms concerning for pyelonephritis or nephrolithiasis at this time.  Patient will be discharged home stable on bactrim pending urine culture results from today's visit.  She was instructed to follow up with PCP if no improvement in three days.  Worrisome reasons to seek care sooner discussed.    -discussed elevated BP today, proteinuria.  Instructed to follow-up with PCP once antibiotics completed      I have reviewed the nursing notes.    I have reviewed the findings, diagnosis, plan and need for follow up  with the patient.    Discharge Medication List as of 5/20/2017  4:35 PM      START taking these medications    Details   sulfamethoxazole-trimethoprim (BACTRIM DS) 800-160 MG per tablet Take 1 tablet by mouth 2 times daily for 3 days, Disp-6 tablet, R-0, E-Prescribe             Final diagnoses:   Acute cystitis with hematuria       5/20/2017   [facility] EMERGENCY DEPARTMENT     Germán, Tracy Easton, ANU CNP  05/20/17 1640

## 2017-05-20 NOTE — DISCHARGE INSTRUCTIONS
"You do have a urinary infection.  Start bactrim twice daily for 3 days.  You also have protein and blood in urine, can be do to infection.  I would recommend recheck with regular doctor after completion of antibiotics.            *BLADDER INFECTION,Female (Adult)    A bladder infection (\"cystitis\" or \"UTI\") usually causes a constant urge to urinate and a burning when passing urine. Urine may be cloudy, smelly or dark. There may be pain in the lower abdomen. A bladder infection occurs when bacteria from the vaginal area enter the bladder opening (urethra). This can occur from sexual intercourse, wearing tight clothing, dehydration and other factors.  HOME CARE:  1. Drink lots of fluids (at least 6-8 glasses a day, unless you must restrict fluids for other medical reasons). This will force the medicine into your urinary system and flush the bacteria out of your body. Cranberry juice has been shown to help clear out the bacteria.  2. Avoid sexual intercourse until your symptoms are gone.  3. A bladder infection is treated with antibiotics. You may also be given Pyridium (generic = phenazopyridine) to reduce the burning sensation. This medicine will cause your urine to become a bright orange color. The orange urine may stain clothing. You may wear a pad or panty-liner to protect clothing.  PREVENTING FUTURE INFECTIONS:  1. Always wipe from front to back after a bowel movement.  2. Keep the genital area clean and dry.  3. Drink plenty of fluids each day to avoid dehydration.  4. Urinate right after intercourse to flush out the bladder.  5. Wear cotton underwear and cotton-lined panty hose; avoid tight-fitting pants.  6. If you are on birth control pills and are having frequent bladder infections, discuss with your doctor.  FOLLOW UP: Return to this facility or see your doctor if ALL symptoms are not gone after three days of treatment.  GET PROMPT MEDICAL ATTENTION if any of the following occur:    Fever over 101 F " (38.3 C)    No improvement by the third day of treatment    Increasing back or abdominal pain    Repeated vomiting; unable to keep medicine down    Weakness, dizziness or fainting    Vaginal discharge    Pain, redness or swelling in the labia (outer vaginal area)    3725-3824 [employer], [address] [place] [address], [place] [address]. All rights reserved. This information is not intended as a substitute for professional medical care. Always follow your healthcare professional's instructions.

## 2017-05-20 NOTE — ED NOTES
Pt has UTI s/sx on and off for a week and 1/2, was totally gone until yesterday, now having dysuria, frequency, back pain and wants HCG testin g

## 2017-05-20 NOTE — ED AVS SNAPSHOT
[facility] Emergency Department    [address] [facility] [address]    Phone:  [phone]    Fax:  [phone]                                       Chloe Obando   MRN: [id number]    Department:  [facility] Emergency Department   Date of Visit:  5/20/2017           After Visit Summary Signature Page     I have received my discharge instructions, and my questions have been answered. I have discussed any challenges I see with this plan with the nurse or doctor.    ..........................................................................................................................................  Patient/Patient Representative Signature      ..........................................................................................................................................  Patient Representative Print Name and Relationship to Patient    ..................................................               ................................................  Date                                            Time    ..........................................................................................................................................  Reviewed by Signature/Title    ...................................................              ..............................................  Date                                                            Time

## 2017-05-20 NOTE — ED AVS SNAPSHOT
" [facility]    [address] [place] [address]    Phone:  [phone]    Fax:  [phone]                                       Chloe Obando   MRN: [id number]    Department:  [facility]   Date of Visit:  5/20/2017           Patient Information     Date Of Birth          1995        Your diagnoses for this visit were:     Acute cystitis with hematuria        You were seen by Tracy Dunlap APRN CNP.      Follow-up Information     Follow up with Mervat Schroeder DO.    Specialties:  Family Practice, Urgent Care    Why:  As needed    Contact information:    [id number] [facility]   [place] [address]  [phone]          Discharge Instructions       You do have a urinary infection.  Start bactrim twice daily for 3 days.  You also have protein and blood in urine, can be do to infection.  I would recommend recheck with regular doctor after completion of antibiotics.            *BLADDER INFECTION,Female (Adult)    A bladder infection (\"cystitis\" or \"UTI\") usually causes a constant urge to urinate and a burning when passing urine. Urine may be cloudy, smelly or dark. There may be pain in the lower abdomen. A bladder infection occurs when bacteria from the vaginal area enter the bladder opening (urethra). This can occur from sexual intercourse, wearing tight clothing, dehydration and other factors.  HOME CARE:  1. Drink lots of fluids (at least 6-8 glasses a day, unless you must restrict fluids for other medical reasons). This will force the medicine into your urinary system and flush the bacteria out of your body. Cranberry juice has been shown to help clear out the bacteria.  2. Avoid sexual intercourse until your symptoms are gone.  3. A bladder infection is treated with antibiotics. You may also be given Pyridium (generic = phenazopyridine) to reduce the burning sensation. This medicine will cause your urine to become a bright " orange color. The orange urine may stain clothing. You may wear a pad or panty-liner to protect clothing.  PREVENTING FUTURE INFECTIONS:  1. Always wipe from front to back after a bowel movement.  2. Keep the genital area clean and dry.  3. Drink plenty of fluids each day to avoid dehydration.  4. Urinate right after intercourse to flush out the bladder.  5. Wear cotton underwear and cotton-lined panty hose; avoid tight-fitting pants.  6. If you are on birth control pills and are having frequent bladder infections, discuss with your doctor.  FOLLOW UP: Return to this facility or see your doctor if ALL symptoms are not gone after three days of treatment.  GET PROMPT MEDICAL ATTENTION if any of the following occur:    Fever over 101 F (38.3 C)    No improvement by the third day of treatment    Increasing back or abdominal pain    Repeated vomiting; unable to keep medicine down    Weakness, dizziness or fainting    Vaginal discharge    Pain, redness or swelling in the labia (outer vaginal area)    9288-2297 The "[employer] [address] [place] [address]. All rights reserved. This information is not intended as a substitute for professional medical care. Always follow your healthcare professional's instructions.      Future Appointments        Provider Department Dept Phone Center    5/24/2017 4:00 PM Kathy Woodson MD [facility] [phone] [facility]      24 Hour Appointment Hotline       To make an appointment at any [facility], call [phone] ([phone]). If you don't have a family doctor or clinic, we will help you find one. [facility] are conveniently located to serve the needs of you and your family.             Review of your medicines      START taking        Dose / Directions Last dose taken    sulfamethoxazole-trimethoprim 800-160 MG per tablet   Commonly known as:  BACTRIM DS   Dose:  1 tablet   Quantity:  6 tablet        Take 1 tablet by mouth  2 times daily for 3 days   Refills:  0          Our records show that you are taking the medicines listed below. If these are incorrect, please call your family doctor or clinic.        Dose / Directions Last dose taken    escitalopram 20 MG tablet   Commonly known as:  LEXAPRO   Dose:  20 mg   Quantity:  90 tablet        Take 1 tablet (20 mg) by mouth daily   Refills:  0        norethindrone-ethinyl estradiol 1-35 MG-MCG per tablet   Commonly known as:  ORTHO-NOVUM 1-35 TAB,NORTREL 1-35 TAB   Dose:  1 tablet   Quantity:  84 tablet        Take 1 tablet by mouth daily   Refills:  3                Prescriptions were sent or printed at these locations (1 Prescription)                   [facility] [place], [place] - [address]   [address] [address]    Telephone:  [phone]   Fax:  [phone]   Hours:                  E-Prescribed (1 of 1)         sulfamethoxazole-trimethoprim (BACTRIM DS) 800-160 MG per tablet                Procedures and tests performed during your visit     HCG qualitative urine    UA with Microscopic reflex to Culture    Urine Culture      Orders Needing Specimen Collection     None      Pending Results     Date and Time Order Name Status Description    5/20/2017 1620 Urine Culture In process             Pending Culture Results     Date and Time Order Name Status Description    5/20/2017 1620 Urine Culture In process             Pending Results Instructions     If you had any lab results that were not finalized at the time of your Discharge, you can call the ED Lab Result RN at [phone]. You will be contacted by this team for any positive Lab results or changes in treatment. The nurses are available 7 days a week from 10A to 6:30P.  You can leave a message 24 hours per day and they will return your call.        Test Results From Your Hospital Stay        5/20/2017  4:27 PM      Component Results     Component Value Ref Range & Units Status    Color Urine  Yellow  Final    Appearance Urine Slightly Cloudy  Final    Glucose Urine Negative NEG mg/dL Final    Bilirubin Urine Negative NEG Final    Ketones Urine Negative NEG mg/dL Final    Specific Gravity Urine 1.009 1.003 - 1.035 Final    Blood Urine Small (A) NEG Final    pH Urine 8.0 (H) 5.0 - 7.0 pH Final    Protein Albumin Urine 30 (A) NEG mg/dL Final    Urobilinogen mg/dL Normal 0.0 - 2.0 mg/dL Final    Nitrite Urine Negative NEG Final    Leukocyte Esterase Urine Large (A) NEG Final    Source Unspecified Urine  Final    WBC Urine 81 (H) 0 - 2 /HPF Final    RBC Urine 19 (H) 0 - 2 /HPF Final    WBC Clumps Present (A) NEG /HPF Final    Squamous Epithelial /HPF Urine 12 (H) 0 - 1 /HPF Final    Mucous Urine Present (A) NEG /LPF Final         5/20/2017  4:19 PM      Component Results     Component Value Ref Range & Units Status    HCG Qual Urine Negative NEG Final         5/20/2017  4:33 PM                Thank you for choosing [facility]       Thank you for choosing [facility] for your care. Our goal is always to provide you with excellent care. Hearing back from our patients is one way we can continue to improve our services. Please take a few minutes to complete the written survey that you may receive in the mail after you visit with us. Thank you!        MaPShart Information     [employer] gives you secure access to your electronic health record. If you see a primary care provider, you can also send messages to your care team and make appointments. If you have questions, please call your primary care clinic.  If you do not have a primary care provider, please call [phone] and they will assist you.        Care EveryWhere ID     This is your Care EveryWhere ID. This could be used by other organizations to access your [facility] medical records  [id number]        After Visit Summary       This is your record. Keep this with you and show to your community pharmacist(s) and doctor(s) at your next visit.

## 2017-05-22 LAB
BACTERIA SPEC CULT: ABNORMAL
MICRO REPORT STATUS: ABNORMAL
MICROORGANISM SPEC CULT: ABNORMAL
SPECIMEN SOURCE: ABNORMAL

## 2017-05-24 ENCOUNTER — OFFICE VISIT (OUTPATIENT)
Dept: OBGYN | Facility: CLINIC | Age: 22
End: 2017-05-24
Payer: COMMERCIAL

## 2017-05-24 VITALS
HEART RATE: 79 BPM | DIASTOLIC BLOOD PRESSURE: 74 MMHG | SYSTOLIC BLOOD PRESSURE: 115 MMHG | WEIGHT: 202.4 LBS | BODY MASS INDEX: 38.24 KG/M2

## 2017-05-24 DIAGNOSIS — N91.1 AMENORRHEA, SECONDARY: Primary | ICD-10-CM

## 2017-05-24 DIAGNOSIS — E28.2 PCOS (POLYCYSTIC OVARIAN SYNDROME): ICD-10-CM

## 2017-05-24 PROCEDURE — 99214 OFFICE O/P EST MOD 30 MIN: CPT | Performed by: OBSTETRICS & GYNECOLOGY

## 2017-05-24 RX ORDER — PRENATAL VIT/IRON FUM/FOLIC AC 27MG-0.8MG
1 TABLET ORAL DAILY
Qty: 100 TABLET | Refills: 3 | Status: SHIPPED | OUTPATIENT
Start: 2017-05-24 | End: 2018-03-02

## 2017-05-24 RX ORDER — FOLIC ACID 1 MG/1
1 TABLET ORAL DAILY
Qty: 100 TABLET | Refills: 3 | Status: SHIPPED | OUTPATIENT
Start: 2017-05-24 | End: 2018-03-02

## 2017-05-24 NOTE — MR AVS SNAPSHOT
After Visit Summary   5/24/2017    Chloe Obando    MRN: [id number]           Patient Information     Date Of Birth          1995        Visit Information        Provider Department      5/24/2017 4:00 PM Kathy Woodson MD [facility]        Today's Diagnoses     Amenorrhea, secondary    -  1    PCOS (polycystic ovarian syndrome)          Care Instructions    1.  Folic acid and vitamin b 12  2.  Metformin start once a day and in 2 weeks twice day  Exercise 1 1/2 hours 3-5 times per week  Encourage diet  3.  Aim is to get to a BMI of less than 35  4.  Continue the ocps for now  5.  When you have reached your weight-stop and see what happens, still continue the metformin.  If no cycles or if cycles and no pregnancy start clomid-ovulation induction agent          Follow-ups after your visit        Who to contact     If you have questions or need follow up information about today's clinic visit or your schedule please contact [facility] directly at [phone].  Normal or non-critical lab and imaging results will be communicated to you by TLabshart, letter or phone within 4 business days after the clinic has received the results. If you do not hear from us within 7 days, please contact the clinic through Tractive or phone. If you have a critical or abnormal lab result, we will notify you by phone as soon as possible.  Submit refill requests through [employer]ractive or call your pharmacy and they will forward the refill request to us. Please allow 3 business days for your refill to be completed.          Additional Information About Your Visit        [employer] Information     [employer] gives you secure access to your electronic health record. If you see a primary care provider, you can also send messages to your care team and make appointments. If you have questions, please call your primary care clinic.  If you do not have a primary care provider, please call [phone]  and they will assist you.        Care EveryWhere ID     This is your Care EveryWhere ID. This could be used by other organizations to access your [facility] medical records  [id number]        Your Vitals Were     Pulse BMI (Body Mass Index)                79 38.24 kg/m2           Blood Pressure from Last 3 Encounters:   05/24/17 115/74   05/20/17 (!) 142/92   04/13/17 117/71    Weight from Last 3 Encounters:   05/24/17 202 lb 6.4 oz (91.8 kg)   05/20/17 210 lb (95.3 kg)   04/13/17 207 lb (93.9 kg)              Today, you had the following     No orders found for display         Today's Medication Changes          These changes are accurate as of: 5/24/17  4:23 PM.  If you have any questions, ask your nurse or doctor.               Start taking these medicines.        Dose/Directions    folic acid 1 MG tablet   Commonly known as:  FOLVITE   Used for:  PCOS (polycystic ovarian syndrome), Amenorrhea, secondary   Started by:  Kathy Woodson MD        Dose:  1 mg   Take 1 tablet (1 mg) by mouth daily   Quantity:  100 tablet   Refills:  3       metFORMIN 850 MG tablet   Commonly known as:  GLUCOPHAGE   Used for:  Amenorrhea, secondary   Started by:  Kathy Woodson MD        Dose:  850 mg   Take 1 tablet (850 mg) by mouth 2 times daily (with meals)   Quantity:  180 tablet   Refills:  3       Vitamin B 12 100 MCG Lozg   Used for:  Amenorrhea, secondary   Started by:  Kathy Woodson MD        Dose:  1 tablet   Take 1 tablet by mouth daily   Quantity:  90 lozenge   Refills:  3            Where to get your medicines      These medications were sent to [facility] [place], [place] - [address]  [address] [address]     Phone:  [phone]     folic acid 1 MG tablet    metFORMIN 850 MG tablet    Vitamin B 12 100 MCG Lozg                Primary Care Provider Office Phone # Fax #    Mervat Jemma Schroeder [phone]       [place] [address] [place] [address]        Thank you!     Thank you for choosing [facility]  for your care. Our goal is always to provide you with excellent care. Hearing back from our patients is one way we can continue to improve our services. Please take a few minutes to complete the written survey that you may receive in the mail after your visit with us. Thank you!             Your Updated Medication List - Protect others around you: Learn how to safely use, store and throw away your medicines at www.disposemymeds.org.          This list is accurate as of: 5/24/17  4:23 PM.  Always use your most recent med list.                   Brand Name Dispense Instructions for use    escitalopram 20 MG tablet    LEXAPRO    90 tablet    Take 1 tablet (20 mg) by mouth daily       folic acid 1 MG tablet    FOLVITE    100 tablet    Take 1 tablet (1 mg) by mouth daily       metFORMIN 850 MG tablet    GLUCOPHAGE    180 tablet    Take 1 tablet (850 mg) by mouth 2 times daily (with meals)       norethindrone-ethinyl estradiol 1-35 MG-MCG per tablet    ORTHO-NOVUM 1-35 TAB,NORTREL 1-35 TAB    84 tablet    Take 1 tablet by mouth daily       Vitamin B 12 100 MCG Lozg     90 lozenge    Take 1 tablet by mouth daily

## 2017-05-24 NOTE — NURSING NOTE
"Initial /74 (BP Location: Left arm, Patient Position: Chair, Cuff Size: Adult Large)  Pulse 79  Wt 202 lb 6.4 oz (91.8 kg)  BMI 38.24 kg/m2 Estimated body mass index is 38.24 kg/(m^2) as calculated from the following:    Height as of 4/13/17: 5' 1\" (1.549 m).    Weight as of this encounter: 202 lb 6.4 oz (91.8 kg). .    "

## 2017-05-24 NOTE — PROGRESS NOTES
21 year old   here for f/u of amenorrhea.  She started the birth control pills and the second week into it had a horribly heavy cycle for 12 days still taking the pill.  Now is on the sugar pills of the second pack and no cycle.  Had the US the day after her last appt here.  Otherwise doing fine.      Prior visit  21 year old with no cycles.  She started with menarche 5th grade.  She had normal cycles and didn't have cycles.  She started ocps age 16 and had normal cycles for a year.  Then no cycles after that but she had spotting here and there.  She was on the ocps for acne and she took acutane in her jack year of high school.  She was told to stop ocps last august-6 months ago.  She is using condoms for contraception.  She has spotting on and off but no real menstrual cycle.  Last year she did a progesterone withdrawal bleed and didn't have a cycle after it.  She is not interested in getting pregnant right now.  She is using condoms.  No pain issues.   she had a recent pregnancy test negative.      Lab Results   Component Value Date    PAP NIL 2016     Past Medical History:   Diagnosis Date     Closed fracture of unspecified part of humerus     Left Humerus Fracture     Hematuria as child     Urinary tract infection, site not specified as child    nl renal U/S, no VCUG     Past Surgical History:   Procedure Laterality Date     TONSILLECTOMY ADULT Bilateral 3/15/2017    Procedure: TONSILLECTOMY ADULT;  Surgeon: Kamini Valdovinos MD;  Location: [facility] OR     [facility]-reviewed   ROS: 10 point ROS neg other than the symptoms noted above in the HPI.  /74 (BP Location: Left arm, Patient Position: Chair, Cuff Size: Adult Large)  Pulse 79  Wt 202 lb 6.4 oz (91.8 kg)  BMI 38.24 kg/m2  ASSESSMENT / PLAN:  (N91.1) Amenorrhea, secondary  (primary encounter diagnosis)  Comment: see below  Plan: metFORMIN (GLUCOPHAGE) 850 MG tablet,         Cyanocobalamin (VITAMIN B 12) 100 MCG LOZG,         folic acid (FOLVITE) 1  MG tablet            (E28.2) PCOS (polycystic ovarian syndrome)  Comment: see below  Plan: folic acid (FOLVITE) 1 MG tablet          1.  Folic acid and vitamin b 12  2.  Metformin start once a day and in 2 weeks twice day  Exercise 1 1/2 hours 3-5 times per week  Encourage diet  3.  Aim is to get to a BMI of less than 35  4.  Continue the ocps for now  5.  When you have reached your weight-stop and see what happens, still continue the metformin.  If no cycles or if cycles and no pregnancy start clomid-ovulation induction agent  30 minutes was spent face to face with the patient today discussing her history, diagnosis, and follow-up plan as noted above.  Over 50% of the visit was spent in counseling and coordination of care.    Total Visit Time: 30 minutes.  Kathy Woodson MD

## 2017-05-24 NOTE — PATIENT INSTRUCTIONS
1.  Folic acid and vitamin b 12  2.  Metformin start once a day and in 2 weeks twice day  Exercise 1 1/2 hours 3-5 times per week  Encourage diet  3.  Aim is to get to a BMI of less than 35  4.  Continue the ocps for now  5.  When you have reached your weight-stop and see what happens, still continue the metformin.  If no cycles or if cycles and no pregnancy start clomid-ovulation induction agent

## 2017-06-14 NOTE — ANESTHESIA POSTPROCEDURE EVALUATION
Patient: Chloe Obando    Procedure(s):  Bilateral Tonsillectomy - Wound Class: II-Clean Contaminated    Diagnosis:recurrent tonsillitis  Diagnosis Additional Information: No value filed.    Anesthesia Type:  General, ETT    Note:  Anesthesia Post Evaluation    Patient location during evaluation: Bedside  Patient participation: Able to fully participate in evaluation  Level of consciousness: awake and alert  Pain management: adequate  Airway patency: patent  Respiratory status: room air  Hydration status: stable  PONV: none     Anesthetic complications: None          Last vitals:  Vitals:    03/15/17 1030 03/15/17 1100 03/15/17 1115   BP: 117/82 129/78 114/69   Pulse: 79     Resp: 18     Temp: 36.4  C (97.5  F) 36.8  C (98.2  F)    SpO2: 97%  98%         Electronically Signed By: ANU Bowie CRNA  March 15, 2017  11:32 AM   VITALIY called Waqar RN with [employer]  [phone] and left message that pt discharged with no dc needs.        06/14/17 1311   Final Note   Assessment Type Final Discharge Note   Discharge Disposition Home   Discharge planning education complete? Yes   Hospital Follow Up  Appt(s) scheduled? Yes   Discharge plans and expectations educations in teach back method with documentation complete? Yes   Offered [facility] -- Bedside Delivery? n/a   Discharge/Hospital Encounter Summary to (non-[facility]) PCP No   Referral to Outpatient Case Management complete? No   Referral to / orders for Home Health Complete? No   30 day supply of medicines given at discharge, if documented non-compliance / non-adherence? No   Any social issues identified prior to discharge? No   Did you assess the readiness or willingness of the family or caregiver to support self management of care? No   Right Care Referral Info   Post Acute Recommendation No Care

## 2017-07-10 ENCOUNTER — MYC REFILL (OUTPATIENT)
Dept: OBGYN | Facility: CLINIC | Age: 22
End: 2017-07-10

## 2017-07-10 DIAGNOSIS — N91.2 ABSENCE OF MENSTRUATION: ICD-10-CM

## 2017-12-01 ENCOUNTER — OFFICE VISIT (OUTPATIENT)
Dept: FAMILY MEDICINE | Facility: CLINIC | Age: 22
End: 2017-12-01
Payer: COMMERCIAL

## 2017-12-01 VITALS
TEMPERATURE: 97.6 F | HEART RATE: 92 BPM | WEIGHT: 208.8 LBS | BODY MASS INDEX: 39.42 KG/M2 | DIASTOLIC BLOOD PRESSURE: 78 MMHG | HEIGHT: 61 IN | SYSTOLIC BLOOD PRESSURE: 118 MMHG

## 2017-12-01 DIAGNOSIS — R07.0 THROAT PAIN: ICD-10-CM

## 2017-12-01 DIAGNOSIS — F41.1 GENERALIZED ANXIETY DISORDER: Primary | ICD-10-CM

## 2017-12-01 DIAGNOSIS — L70.8 OTHER ACNE: ICD-10-CM

## 2017-12-01 DIAGNOSIS — F33.1 MODERATE EPISODE OF RECURRENT MAJOR DEPRESSIVE DISORDER (H): ICD-10-CM

## 2017-12-01 LAB
DEPRECATED S PYO AG THROAT QL EIA: NORMAL
SPECIMEN SOURCE: NORMAL

## 2017-12-01 PROCEDURE — 87880 STREP A ASSAY W/OPTIC: CPT | Performed by: FAMILY MEDICINE

## 2017-12-01 PROCEDURE — 99214 OFFICE O/P EST MOD 30 MIN: CPT | Performed by: FAMILY MEDICINE

## 2017-12-01 PROCEDURE — 87081 CULTURE SCREEN ONLY: CPT | Performed by: FAMILY MEDICINE

## 2017-12-01 ASSESSMENT — ANXIETY QUESTIONNAIRES
5. BEING SO RESTLESS THAT IT IS HARD TO SIT STILL: SEVERAL DAYS
2. NOT BEING ABLE TO STOP OR CONTROL WORRYING: MORE THAN HALF THE DAYS
6. BECOMING EASILY ANNOYED OR IRRITABLE: NEARLY EVERY DAY
1. FEELING NERVOUS, ANXIOUS, OR ON EDGE: SEVERAL DAYS
7. FEELING AFRAID AS IF SOMETHING AWFUL MIGHT HAPPEN: MORE THAN HALF THE DAYS
3. WORRYING TOO MUCH ABOUT DIFFERENT THINGS: MORE THAN HALF THE DAYS
GAD7 TOTAL SCORE: 12

## 2017-12-01 ASSESSMENT — PATIENT HEALTH QUESTIONNAIRE - PHQ9
SUM OF ALL RESPONSES TO PHQ QUESTIONS 1-9: 12
5. POOR APPETITE OR OVEREATING: SEVERAL DAYS

## 2017-12-01 NOTE — LETTER
My Depression Action Plan  Name: Chloe Obando   Date of Birth 1995  Date: 12/1/2017    My doctor: Mervat Schroeder   My clinic: [address] [id number] [phone]          GREEN    ZONE   Good Control    What it looks like:     Things are going generally well. You have normal up s and down s. You may even feel depressed from time to time, but bad moods usually last less than a day.   What you need to do:  1. Continue to care for yourself (see self care plan)  2. Check your depression survival kit and update it as needed  3. Follow your physician s recommendations including any medication.  4. Do not stop taking medication unless you consult with your physician first.           YELLOW         ZONE Getting Worse    What it looks like:     Depression is starting to interfere with your life.     It may be hard to get out of bed; you may be starting to isolate yourself from others.    Symptoms of depression are starting to last most all day and this has happened for several days.     You may have suicidal thoughts but they are not constant.   What you need to do:     1. Call your care team, your response to treatment will improve if you keep your care team informed of your progress. Yellow periods are signs an adjustment may need to be made.     2. Continue your self-care, even if you have to fake it!    3. Talk to someone in your support network    4. Open up your depression survival kit           RED    ZONE Medical Alert - Get Help    What it looks like:     Depression is seriously interfering with your life.     You may experience these or other symptoms: You can t get out of bed most days, can t work or engage in other necessary activities, you have trouble taking care of basic hygiene, or basic responsibilities, thoughts of suicide or death that will not go away, self-injurious behavior.     What you need to do:  1. Call your care team and  request a same-day appointment. If they are not available (weekends or after hours) call your local crisis line, emergency room or 911.      Electronically signed by: Leonila Urbina, December 1, 2017    Depression Self Care Plan / Survival Kit    Self-Care for Depression  Here s the deal. Your body and mind are really not as separate as most people think.  What you do and think affects how you feel and how you feel influences what you do and think. This means if you do things that people who feel good do, it will help you feel better.  Sometimes this is all it takes.  There is also a place for medication and therapy depending on how severe your depression is, so be sure to consult with your medical provider and/ or Behavioral Health Consultant if your symptoms are worsening or not improving.     In order to better manage my stress, I will:    Exercise  Get some form of exercise, every day. This will help reduce pain and release endorphins, the  feel good  chemicals in your brain. This is almost as good as taking antidepressants!  This is not the same as joining a gym and then never going! (they count on that by the way ) It can be as simple as just going for a walk or doing some gardening, anything that will get you moving.      Hygiene   Maintain good hygiene (Get out of bed in the morning, Make your bed, Brush your teeth, Take a shower, and Get dressed like you were going to work, even if you are unemployed).  If your clothes don't fit try to get ones that do.    Diet  I will strive to eat foods that are good for me, drink plenty of water, and avoid excessive sugar, caffeine, alcohol, and other mood-altering substances.  Some foods that are helpful in depression are: complex carbohydrates, B vitamins, flaxseed, fish or fish oil, fresh fruits and vegetables.    Psychotherapy  I agree to participate in Individual Therapy (if recommended).    Medication  If prescribed medications, I agree to take them.  Missing doses  can result in serious side effects.  I understand that drinking alcohol, or other illicit drug use, may cause potential side effects.  I will not stop my medication abruptly without first discussing it with my provider.    Staying Connected With Others  I will stay in touch with my friends, family members, and my primary care provider/team.    Use your imagination  Be creative.  We all have a creative side; it doesn t matter if it s oil painting, sand castles, or mud pies! This will also kick up the endorphins.    Witness Beauty  (AKA stop and smell the roses) Take a look outside, even in mid-winter. Notice colors, textures. Watch the squirrels and birds.     Service to others  Be of service to others.  There is always someone else in need.  By helping others we can  get out of ourselves  and remember the really important things.  This also provides opportunities for practicing all the other parts of the program.    Humor  Laugh and be silly!  Adjust your TV habits for less news and crime-drama and more comedy.    Control your stress  Try breathing deep, massage therapy, biofeedback, and meditation. Find time to relax each day.     My support system    Clinic Contact:  Phone number:    Contact 1:  Phone number:    Contact 2:  Phone number:    Jainism/:  Phone number:    Therapist:  Phone number:    Local crisis center:    Phone number:    Other community support:  Phone number:

## 2017-12-01 NOTE — MR AVS SNAPSHOT
After Visit Summary   12/1/2017    Chloe Obando    MRN: [id number]           Patient Information     Date Of Birth          1995        Visit Information        Provider Department      12/1/2017 8:40 AM Mervat Schroeder DO [facility]        Today's Diagnoses     Generalized anxiety disorder    -  1    Moderate episode of recurrent major depressive disorder (H)        Other acne        Throat pain          Care Instructions    We'll try the sertraline for your mood.  You can begin with 1/2 tablet daily for about 1 week followed by 1 full tablet daily.  We should follow up in about 4-6 weeks either in clinic, by Evisit or telephone visit to see how things are going.  Please let me know right away of you have difficulty tolerating this medication.    I did place a referral for you to discuss accutane further with dermatology.    Your strep test was negative today, we'll let you know the culture results when available.              Follow-ups after your visit        Additional Services     DERMATOLOGY REFERRAL       Your provider has referred you to: [facility]: [facility] [phone]   [email or web address]    Please be aware that coverage of these services is subject to the terms and limitations of your health insurance plan.  Call member services at your health plan with any benefit or coverage questions.      Please bring the following with you to your appointment:    (1) Any X-Rays, CTs or MRIs which have been performed.  Contact the facility where they were done to arrange for  prior to your scheduled appointment.    (2) List of current medications  (3) This referral request   (4) Any documents/labs given to you for this referral                  Who to contact     Normal or non-critical lab and imaging results will be communicated to you by MyChart, letter or phone within 4 business days after the clinic has received the  "results. If you do not hear from us within 7 days, please contact the clinic through Pl[facility] or phone. If you have a critical or abnormal lab result, we will notify you by phone as soon as possible.  Submit refill requests through Plum or call your pharmacy and they will forward the refill request to us. Please allow 3 business days for your refill to be completed.          If you need to speak with a  for additional information , please call: [phone]           Additional Information About Your Visit        Plum Information     Plum gives you secure access to your electronic health record. If you see a primary care provider, you can also send messages to your care team and make appointments. If you have questions, please call your primary care clinic.  If you do not have a primary care provider, please call [phone] and they will assist you.        Care EveryWhere ID     This is your Care EveryWhere ID. This could be used by other organizations to access your [facility] medical records  [id number]        Your Vitals Were     Pulse Temperature Height Breastfeeding? BMI (Body Mass Index)       92 97.6  F (36.4  C) (Tympanic) 5' 1\" (1.549 m) No 39.45 kg/m2        Blood Pressure from Last 3 Encounters:   12/01/17 118/78   05/24/17 115/74   05/20/17 (!) 142/92    Weight from Last 3 Encounters:   12/01/17 208 lb 12.8 oz (94.7 kg)   05/24/17 202 lb 6.4 oz (91.8 kg)   05/20/17 210 lb (95.3 kg)              We Performed the Following     Beta strep group A culture     DEPRESSION ACTION PLAN (DAP)     DERMATOLOGY REFERRAL     Strep, Rapid Screen          Today's Medication Changes          These changes are accurate as of: 12/1/17  9:34 AM.  If you have any questions, ask your nurse or doctor.               Start taking these medicines.        Dose/Directions    sertraline 50 MG tablet   Commonly known as:  ZOLOFT   Used for:  Generalized anxiety disorder, Moderate episode of recurrent major " depressive disorder (H)   Started by:  Mervat Schroeder DO        Take 1/2 tablet (25 mg) for 1 weeks, then increase to 1 tablet orally daily   Quantity:  30 tablet   Refills:  1            Where to get your medicines      [place] [facility] [place] [address]  [address] [facility] [address]     Phone:  [phone]     sertraline 50 MG tablet                Primary Care Provider Office Phone # Fax #    Mervat Schroeder [phone] [address]   [place] [address]        Equal Access to Services     [facility]: Hadii abiel ku hadasho Soomaali, waaxda luqadaha, qaybta kaalmada adeegyada, gus evans . So [place] [phone].    ATENCIÓN: Si habla español, tiene a gutierrez disposición servicios gratuitos de asistencia lingüística. Llame al [phone].    We comply with applicable federal civil rights laws and [place] laws. We do not discriminate on the basis of race, color, national origin, age, disability, sex, sexual orientation, or gender identity.            Thank you!     Thank you for choosing [facility]  for your care. Our goal is always to provide you with excellent care. Hearing back from our patients is one way we can continue to improve our services. Please take a few minutes to complete the written survey that you may receive in the mail after your visit with us. Thank you!             Your Updated Medication List - Protect others around you: Learn how to safely use, store and throw away your medicines at www.disposemymeds.org.          This list is accurate as of: 12/1/17  9:34 AM.  Always use your most recent med list.                   Brand Name Dispense Instructions for use Diagnosis    folic acid 1 MG tablet    FOLVITE    100 tablet    Take 1 tablet (1 mg) by mouth daily    PCOS (polycystic ovarian syndrome), Amenorrhea, secondary       metFORMIN 850 MG tablet     GLUCOPHAGE    180 tablet    Take 1 tablet (850 mg) by mouth 2 times daily (with meals)    Amenorrhea, secondary       norethindrone-ethinyl estradiol 1-35 MG-MCG per tablet    ORTHO-NOVUM 1-35 TAB,NORTREL 1-35 TAB    84 tablet    Take 1 tablet by mouth daily    Absence of menstruation       prenatal multivitamin plus iron 27-0.8 MG Tabs per tablet     100 tablet    Take 1 tablet by mouth daily    PCOS (polycystic ovarian syndrome), Amenorrhea, secondary       sertraline 50 MG tablet    ZOLOFT    30 tablet    Take 1/2 tablet (25 mg) for 1 weeks, then increase to 1 tablet orally daily    Generalized anxiety disorder, Moderate episode of recurrent major depressive disorder (H)

## 2017-12-01 NOTE — NURSING NOTE
"Initial /78  Pulse 92  Temp 97.6  F (36.4  C) (Tympanic)  Ht 5' 1\" (1.549 m)  Wt 208 lb 12.8 oz (94.7 kg)  Breastfeeding? No  BMI 39.45 kg/m2 Estimated body mass index is 39.45 kg/(m^2) as calculated from the following:    Height as of this encounter: 5' 1\" (1.549 m).    Weight as of this encounter: 208 lb 12.8 oz (94.7 kg). .      "

## 2017-12-01 NOTE — PATIENT INSTRUCTIONS
We'll try the sertraline for your mood.  You can begin with 1/2 tablet daily for about 1 week followed by 1 full tablet daily.  We should follow up in about 4-6 weeks either in clinic, by Evisit or telephone visit to see how things are going.  Please let me know right away of you have difficulty tolerating this medication.    I did place a referral for you to discuss accutane further with dermatology.    Your strep test was negative today, we'll let you know the culture results when available.

## 2017-12-01 NOTE — PROGRESS NOTES
SUBJECTIVE:   Chloe Obando is a 22 year old female who presents to clinic today for the following health issues:    Depression and Anxiety Follow-Up    Status since last visit: Worsened for the last 2 weeks, stopped Lexapro 6 months ago    Other associated symptoms:None    Complicating factors:     Significant life event: No     Current substance abuse: None    PHQ-9 Score and MyChart F/U Questions 9/7/2016 3/9/2017 12/1/2017   Total Score 5 7 12   Q9: Suicide Ideation Not at all Not at all Not at all     KIN-7 SCORE 7/13/2016 9/7/2016 12/1/2017   Total Score 10 6 12       PHQ-9  English  PHQ-9   Any Language  GAD7  Suicide Assessment Five-step Evaluation and Treatment (SAFE-T)      Felt like the lexapro helped with anxiety but not as much as she thought it might.  Still struggled some with feeling anxious and down, even while taking the medication.  Just ran out of pills and never refilled.  Would be interested in trying something different.        Amount of exercise or physical activity: 3-4 days/week for an average of 45-60 minutes    Problems taking medications regularly: No    Medication side effects: none    Diet: regular (no restrictions)    * acne  Feels like she is breaking out almost daily. Worst on her back.  Has been bad since March.  Did accutane in high school.  Prior to that tried doxycycline which she did not feel was effective.  Would like to consider retrying accutane    * has had a sore throat for a few days.  No fever.  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      Started OCP through GYN.  Was told there nothing to worry about as long as she stayed on the OCP.    Problem list and histories reviewed & adjusted, as indicated.  Additional history: as documented    Reviewed and updated as needed this visit by clinical staffTobacco  Allergies  Meds  Med Hx  Surg Hx  Fam Hx  Soc Hx      Reviewed and updated as needed this visit by Provider  Tobacco  Allergies  Med Hx  Surg  Hx  Fam Hx  Soc Hx        ROS: Remainder of Constitutional, CV, Respiratory, GI,  negative with exception of that mentioned above    PE:  VS as above   Gen:  WN/WD/WH female in NAD   HEENT:  NC/AT, conjunctiva wnl, TM's wnl melissa pearly gray with good light reflex, oropharynx clear without  exudate/erythema   Heart:  RRR without murmur, nl S1, S2, no rubs or gallops   Lungs CTA melissa without rales/ronchi/wheezes   Psych: Alert and oriented times 3; coherent speech, normal   rate and volume, able to articulate logical thoughts, able   to abstract reason, no tangential thoughts, no hallucinations   or delusions  Her affect is mildly anxious   Skin;  Scattered hyperpigmented lesions on jawline and back    Rapid strep negative    A?P:      ICD-10-CM    1. Generalized anxiety disorder F41.1 sertraline (ZOLOFT) 50 MG tablet   2. Moderate episode of recurrent major depressive disorder (H) F33.1 sertraline (ZOLOFT) 50 MG tablet   3. Other acne L70.8 DERMATOLOGY REFERRAL   4. Throat pain R07.0 Strep, Rapid Screen     Beta strep group A culture     Patient Instructions   We'll try the sertraline for your mood.  You can begin with 1/2 tablet daily for about 1 week followed by 1 full tablet daily.  We should follow up in about 4-6 weeks either in clinic, by Evisit or telephone visit to see how things are going.  Please let me know right away of you have difficulty tolerating this medication.    I did place a referral for you to discuss accutane further with dermatology.    Your strep test was negative today, we'll let you know the culture results when available.

## 2017-12-02 LAB
BACTERIA SPEC CULT: NORMAL
SPECIMEN SOURCE: NORMAL

## 2017-12-02 ASSESSMENT — ANXIETY QUESTIONNAIRES: GAD7 TOTAL SCORE: 12

## 2017-12-26 ENCOUNTER — E-VISIT (OUTPATIENT)
Dept: FAMILY MEDICINE | Facility: CLINIC | Age: 22
End: 2017-12-26
Payer: COMMERCIAL

## 2017-12-26 DIAGNOSIS — R05.9 COUGH: Primary | ICD-10-CM

## 2017-12-26 PROCEDURE — 99444 ZZC PHYSICIAN ONLINE EVALUATION & MANAGEMENT SERVICE: CPT | Performed by: FAMILY MEDICINE

## 2017-12-27 RX ORDER — ALBUTEROL SULFATE 90 UG/1
2 AEROSOL, METERED RESPIRATORY (INHALATION) EVERY 6 HOURS PRN
Qty: 1 INHALER | Refills: 0 | Status: SHIPPED | OUTPATIENT
Start: 2017-12-27 | End: 2018-06-01

## 2017-12-31 ENCOUNTER — HOSPITAL ENCOUNTER (EMERGENCY)
Facility: CLINIC | Age: 22
Discharge: HOME | End: 2017-12-31
Attending: NURSE PRACTITIONER | Admitting: NURSE PRACTITIONER
Payer: COMMERCIAL

## 2017-12-31 ENCOUNTER — APPOINTMENT (OUTPATIENT)
Dept: GENERAL RADIOLOGY | Facility: CLINIC | Age: 22
End: 2017-12-31
Attending: NURSE PRACTITIONER
Payer: COMMERCIAL

## 2017-12-31 VITALS
RESPIRATION RATE: 16 BRPM | SYSTOLIC BLOOD PRESSURE: 128 MMHG | DIASTOLIC BLOOD PRESSURE: 82 MMHG | TEMPERATURE: 97.6 F | OXYGEN SATURATION: 96 %

## 2017-12-31 DIAGNOSIS — R05.9 COUGH: ICD-10-CM

## 2017-12-31 DIAGNOSIS — R09.82 POST-NASAL DRIP: ICD-10-CM

## 2017-12-31 DIAGNOSIS — R09.81 CONGESTION OF PARANASAL SINUS: Primary | ICD-10-CM

## 2017-12-31 PROCEDURE — 71020 XR CHEST 2 VW: CPT

## 2017-12-31 PROCEDURE — 99213 OFFICE O/P EST LOW 20 MIN: CPT | Performed by: NURSE PRACTITIONER

## 2017-12-31 PROCEDURE — 99213 OFFICE O/P EST LOW 20 MIN: CPT | Mod: 25

## 2017-12-31 RX ORDER — FLUTICASONE PROPIONATE 50 MCG
2 SPRAY, SUSPENSION (ML) NASAL DAILY
Qty: 1 BOTTLE | Refills: 0 | Status: SHIPPED | OUTPATIENT
Start: 2017-12-31 | End: 2018-03-02

## 2017-12-31 RX ORDER — FEXOFENADINE HCL 180 MG/1
180 TABLET ORAL DAILY
Qty: 30 TABLET | Refills: 0 | Status: SHIPPED | OUTPATIENT
Start: 2017-12-31 | End: 2018-03-02

## 2017-12-31 ASSESSMENT — ENCOUNTER SYMPTOMS
SHORTNESS OF BREATH: 0
DIAPHORESIS: 0
DIARRHEA: 0
ACTIVITY CHANGE: 0
NAUSEA: 0
FATIGUE: 0
CONSTIPATION: 0
APPETITE CHANGE: 0
CHILLS: 0
VOMITING: 0
RHINORRHEA: 1
WHEEZING: 0
ABDOMINAL PAIN: 0
FEVER: 0
COUGH: 1
SORE THROAT: 0

## 2017-12-31 NOTE — ED PROVIDER NOTES
History   No chief complaint on file.    HPI    Chloe Obando is a 22 year old female who presents to the ED with a 6 week history of non productive deep hacking cough.  She reports associative symptoms of nasal congestion, post nasal drip.  Pt states she had an e-visit this past week with her provider and was given a rx for albuterol and she has been using this as 2 puffs every 6 hours and feels it has not been changing her symptoms.  She has tried Dayquil, mucinex, with improvement of symptoms.  She denies chest pain, decreased urine output, swollen glands, post nasal drip, ear ache,  dizziness, fever, nausea, shortness of breath, vomiting and wheezing.  She has been exposed to ill contacts at work. Predisposing factors include None.      Problem List:    Patient Active Problem List    Diagnosis Date Noted     Moderate episode of recurrent major depressive disorder (H) 12/01/2017     Priority: Medium     PCOS (polycystic ovarian syndrome) 03/29/2017     Priority: Medium     Generalized anxiety disorder 11/24/2015     Priority: Medium     Major depressive disorder, single episode, mild (H) 11/24/2015     Priority: Medium     panic attack 05/28/2015     Priority: Medium     Obesity 11/21/2013     Priority: Medium     GERD (gastroesophageal reflux disease) 12/11/2012     Priority: Medium     11/2012: Trial of Prilosec for 8 weeks.       Plantar warts 02/10/2011     Priority: Medium     Other acne 11/05/2007     Priority: Medium     05/07/09: trial of Minocycline 100 mg qd,  Retin-A 0.025% qHS.  06/2011: Off meds for 2 years.  Restart minocycline 100 mg qd and Benzyl peroxide qd.  01/2012: Increase minocycline 100 mg to BID, add on retin-A 0.025%, refer to dermatology.       Irregular menstrual cycle 07/19/2007     Priority: Medium     07/07: skipping months. Likely secondary to immature hypothalamic/pituitary/ovarian axis.       HYPERLIPIDEMIA 10/05/2006     Priority: Medium     Noted in transferred clinic records.   Recommend repeat fasting.  05/07/09:  Lipids improved.  Now within normal range.          Past Medical History:    Past Medical History:   Diagnosis Date     Closed fracture of unspecified part of humerus      Hematuria as child     Urinary tract infection, site not specified as child       Past Surgical History:    Past Surgical History:   Procedure Laterality Date     TONSILLECTOMY ADULT Bilateral 3/15/2017    Procedure: TONSILLECTOMY ADULT;  Surgeon: Kamini Valdovinos MD;  Location: [facility] OR       Family History:    Family History   Problem Relation Age of Onset     DIABETES Mother      gestational diabetes (history of)     Family History Negative Father      C.A.D. Maternal Grandmother      Quad. Bypass at age 74     DIABETES Maternal Grandmother      Asthma Maternal Grandmother      Lipids Maternal Grandmother      Arthritis Maternal Grandfather      Hypertension Paternal Grandmother      Lipids Paternal Grandmother      cholesterol     Family History Negative Paternal Grandfather        Social History:  Marital Status:  Single [1]  Social History   Substance Use Topics     Smoking status: Never Smoker     Smokeless tobacco: Never Used      Comment: parents don't smoke in the house     Alcohol use No        Medications:      fexofenadine (ALLEGRA) 180 MG tablet   fluticasone (FLONASE) 50 MCG/ACT spray   albuterol (PROAIR HFA/PROVENTIL HFA/VENTOLIN HFA) 108 (90 BASE) MCG/ACT Inhaler   sertraline (ZOLOFT) 50 MG tablet   metFORMIN (GLUCOPHAGE) 850 MG tablet   folic acid (FOLVITE) 1 MG tablet   Prenatal Vit-Fe Fumarate-FA (PRENATAL MULTIVITAMIN  PLUS IRON) 27-0.8 MG TABS per tablet   norethindrone-ethinyl estradiol (ORTHO-NOVUM 1-35 TAB,NORTREL 1-35 TAB) 1-35 MG-MCG per tablet         Review of Systems   Constitutional: Negative for activity change, appetite change, chills, diaphoresis, fatigue and fever.   HENT: Positive for congestion, postnasal drip and rhinorrhea. Negative for sore throat.    Respiratory: Positive  for cough. Negative for shortness of breath and wheezing.    Cardiovascular: Negative for chest pain.   Gastrointestinal: Negative for abdominal pain, constipation, diarrhea, nausea and vomiting.   All other systems reviewed and are negative.      Physical Exam   BP: 128/82  Heart Rate: 57  Temp: 97.6  F (36.4  C)  Resp: 16  SpO2: 96 %      Physical Exam  GENERAL APPEARANCE: Alert, no acute distress  EYES: PERRL, EOM normal, conjunctiva pink, sclera nonicteric, lids normal  HENT: Mouth and posterior oropharynx normal, moist mucous membranes, Ears and TMs normal , nose no nasal discharge or congestion, clear rhinorrhea, mucosal erythema, mucosal edema  NECK: few small anterior cervical nodes  RESP: lungs clear to auscultation bilaterally  CV: regular rate and rhythm, no murmur, rub or gallop  SKIN: no suspicious lesions or rashes  PSYCHE: mentation appears normal, affect and mood normal      ED Course     ED Course     Procedures      Labs Ordered and Resulted from Time of ED Arrival Up to the Time of Departure from the ED - No data to display  Results for orders placed or performed during the hospital encounter of 12/31/17   XR Chest 2 Views    Narrative    CHEST TWO VIEWS 12/31/2017 1:46 PM     COMPARISON: 2-view chest x-ray 1/23/2014.    HISTORY: Cough since Thanksgiving.    FINDINGS: The cardiac silhouette, pulmonary vasculature, lungs and  pleural spaces are within normal limits.      Impression    IMPRESSION: Clear lungs.        Assessments & Plan (with Medical Decision Making)     I have reviewed the nursing notes.    I have reviewed the findings, diagnosis, plan and need for follow up with the patient.  Medical Decision Making:  Upper respiratory infection symptoms with Normal vitals.  DDx:  bronchitis, pneumonia, Viral URI (ie.. Influenza), sinusitis   CXR is indicated.  Rapid Strep test is not indicated.     Assessment:  Sinus congestion, cough, post nasal drip    Plan:   Tylenol, Ibuprofen, Fluids, Rest  and fluticasone nasal spray and allegra    Reassurance given regarding lack of signs of serious infection.  Discussed home treatment with antihistamines.  Recommend follow up in primary care as needed, or sooner if symptoms persist. Return to the ED with fever, trouble swallowing or breathing, or any other concerns.     Condition on disposition: Stable    Discharge Medication List as of 12/31/2017  2:06 PM          Final diagnoses:   Congestion of paranasal sinus   Post-nasal drip   Cough       12/31/2017   [facility] EMERGENCY DEPARTMENT     Didi Berman APRN CNP  12/31/17 [id number]

## 2017-12-31 NOTE — ED AVS SNAPSHOT
[facility] Emergency Department    [address]    [facility] [place] [address]    Phone:  [phone]    Fax:  [phone]                                       Chloe Obando   MRN: [id number]    Department:  [facility] Emergency Department   Date of Visit:  12/31/2017           Patient Information     Date Of Birth          1995        Your diagnoses for this visit were:     Congestion of paranasal sinus     Post-nasal drip     Cough        You were seen by Didi Berman, ANU CNP.      Follow-up Information     Follow up with Mervat Schroeder DO In 1 week.    Specialties:  Family Practice, Urgent Care    Why:  If symptoms worsen, As needed    Contact information:    [address] [place] [address]  [phone]        Discharge References/Attachments     SINUS PROBLEMS, UNDERSTANDING (ENGLISH)      Future Appointments        Provider Department Dept Phone Center    1/31/2018 2:40 PM Yasmine Moreno PA-C [facility] [phone] [facility]      24 Hour Appointment Hotline       To make an appointment at any [facility], call [phone] ([phone]). If you don't have a family doctor or clinic, we will help you find one. [facility] are conveniently located to serve the needs of you and your family.             Review of your medicines      Our records show that you are taking the medicines listed below. If these are incorrect, please call your family doctor or clinic.        Dose / Directions Last dose taken    albuterol 108 (90 BASE) MCG/ACT Inhaler   Commonly known as:  PROAIR HFA/PROVENTIL HFA/VENTOLIN HFA   Dose:  2 puff   Quantity:  1 Inhaler        Inhale 2 puffs into the lungs every 6 hours as needed for shortness of breath / dyspnea or wheezing   Refills:  0        folic acid 1 MG tablet   Commonly known as:  FOLVITE   Dose:  1 mg   Quantity:  100 tablet        Take 1 tablet (1 mg) by mouth daily   Refills:  3        metFORMIN 850 MG tablet   Commonly  known as:  GLUCOPHAGE   Dose:  850 mg   Quantity:  180 tablet        Take 1 tablet (850 mg) by mouth 2 times daily (with meals)   Refills:  3        norethindrone-ethinyl estradiol 1-35 MG-MCG per tablet   Commonly known as:  ORTHO-NOVUM 1-35 TAB,NORTREL 1-35 TAB   Dose:  1 tablet   Quantity:  84 tablet        Take 1 tablet by mouth daily   Refills:  3        prenatal multivitamin plus iron 27-0.8 MG Tabs per tablet   Dose:  1 tablet   Quantity:  100 tablet        Take 1 tablet by mouth daily   Refills:  3        sertraline 50 MG tablet   Commonly known as:  ZOLOFT   Quantity:  30 tablet        Take 1/2 tablet (25 mg) for 1 weeks, then increase to 1 tablet orally daily   Refills:  1                Procedures and tests performed during your visit     XR Chest 2 Views      Orders Needing Specimen Collection     None      Pending Results     Date and Time Order Name Status Description    12/31/2017 1339 XR Chest 2 Views Preliminary             Pending Culture Results     No orders found from 12/29/2017 to 1/1/2018.            Pending Results Instructions     If you had any lab results that were not finalized at the time of your Discharge, you can call the ED Lab Result RN at [phone]. You will be contacted by this team for any positive Lab results or changes in treatment. The nurses are available 7 days a week from 10A to 6:30P.  You can leave a message 24 hours per day and they will return your call.        Test Results From Your Hospital Stay        12/31/2017  1:53 PM      Narrative     CHEST TWO VIEWS 12/31/2017 1:46 PM     COMPARISON: 2-view chest x-ray 1/23/2014.    HISTORY: Cough since Thanksgiving.    FINDINGS: The cardiac silhouette, pulmonary vasculature, lungs and  pleural spaces are within normal limits.        Impression     IMPRESSION: Clear lungs.                 Thank you for choosing [facility]       Thank you for choosing [facility] for your care. Our goal is always to provide you with excellent care.  Hearing back from our patients is one way we can continue to improve our services. Please take a few minutes to complete the written survey that you may receive in the mail after you visit with us. Thank you!        [facility] Information     [facility] gives you secure access to your electronic health record. If you see a primary care provider, you can also send messages to your care team and make appointments. If you have questions, please call your primary care clinic.  If you do not have a primary care provider, please call [phone] and they will assist you.        Care EveryWhere ID     This is your Care EveryWhere ID. This could be used by other organizations to access your [facility] medical records  [id number]        Equal Access to Services     SUSHIL MUHAMMAD : Amanuel Donald, zita desai, alon woods, gus gomez. So [facility] [phone].    ATENCIÓN: Si habla español, tiene a gutierrez disposición servicios gratuitos de asistencia lingüística. Llame al [phone].    We comply with applicable federal civil rights laws and [place] laws. We do not discriminate on the basis of race, color, national origin, age, disability, sex, sexual orientation, or gender identity.            After Visit Summary       This is your record. Keep this with you and show to your community pharmacist(s) and doctor(s) at your next visit.

## 2017-12-31 NOTE — ED AVS SNAPSHOT
[facility] Emergency Department    [address] [address]    Phone:  [phone]    Fax:  [phone]                                       Chloe Obando   MRN: [id number]    Department:  [facility] Emergency Department   Date of Visit:  12/31/2017           After Visit Summary Signature Page     I have received my discharge instructions, and my questions have been answered. I have discussed any challenges I see with this plan with the nurse or doctor.    ..........................................................................................................................................  Patient/Patient Representative Signature      ..........................................................................................................................................  Patient Representative Print Name and Relationship to Patient    ..................................................               ................................................  Date                                            Time    ..........................................................................................................................................  Reviewed by Signature/Title    ...................................................              ..............................................  Date                                                            Time

## 2018-01-11 ENCOUNTER — HOSPITAL ENCOUNTER (EMERGENCY)
Facility: CLINIC | Age: 23
Discharge: HOME | End: 2018-01-11
Attending: NURSE PRACTITIONER | Admitting: NURSE PRACTITIONER
Payer: COMMERCIAL

## 2018-01-11 VITALS
DIASTOLIC BLOOD PRESSURE: 83 MMHG | OXYGEN SATURATION: 98 % | TEMPERATURE: 98.6 F | RESPIRATION RATE: 16 BRPM | HEIGHT: 61 IN | SYSTOLIC BLOOD PRESSURE: 123 MMHG | BODY MASS INDEX: 37.76 KG/M2 | WEIGHT: 200 LBS

## 2018-01-11 DIAGNOSIS — R21 RASH: Primary | ICD-10-CM

## 2018-01-11 PROCEDURE — 99213 OFFICE O/P EST LOW 20 MIN: CPT | Performed by: NURSE PRACTITIONER

## 2018-01-11 PROCEDURE — G0463 HOSPITAL OUTPT CLINIC VISIT: HCPCS

## 2018-01-11 ASSESSMENT — ENCOUNTER SYMPTOMS
FEVER: 0
NAUSEA: 0
DIFFICULTY URINATING: 0
FATIGUE: 0
CHILLS: 0
COUGH: 0
DIAPHORESIS: 0
SORE THROAT: 0
APPETITE CHANGE: 0
ACTIVITY CHANGE: 0
CONSTIPATION: 0
VOMITING: 0
DIARRHEA: 0

## 2018-01-11 NOTE — ED AVS SNAPSHOT
[facility] Emergency Department    [address] [facility] [address]    Phone:  [phone]    Fax:  [phone]                                       Chloe Obando   MRN: [id number]    Department:  [facility] Emergency Department   Date of Visit:  1/11/2018           After Visit Summary Signature Page     I have received my discharge instructions, and my questions have been answered. I have discussed any challenges I see with this plan with the nurse or doctor.    ..........................................................................................................................................  Patient/Patient Representative Signature      ..........................................................................................................................................  Patient Representative Print Name and Relationship to Patient    ..................................................               ................................................  Date                                            Time    ..........................................................................................................................................  Reviewed by Signature/Title    ...................................................              ..............................................  Date                                                            Time

## 2018-01-11 NOTE — ED AVS SNAPSHOT
[facility] Emergency Department    [address]    [facility] [address]    Phone:  [phone]    Fax:  [phone]                                       Chloe Obando   MRN: [id number]    Department:  [facility] Emergency Department   Date of Visit:  1/11/2018           Patient Information     Date Of Birth          1995        Your diagnoses for this visit were:     Rash cannot exclude flea bites without evidence of infection       You were seen by Didi Berman APRN CNP.      Follow-up Information     Follow up with Mervat Schroeder DO.    Specialties:  Family Practice, Urgent Care    Why:  As needed, If symptoms worsen    Contact information:    [address]   [place] [address]  [phone]          Discharge Instructions       Apply neosporin or vaseline or aquaphor to bite marks  Use benadryl for itching  Call vet and discuss treatment for flea eradication  Return if there is concern for skin infection    Discharge References/Attachments     INSECT BITE (ENGLISH)      Future Appointments        Provider Department Dept Phone Center    1/31/2018 2:40 PM Yasmine Moreno PA-C [facility] [phone] [facility]      24 Hour Appointment Hotline       To make an appointment at any [facility], call [phone] ([phone]). If you don't have a family doctor or clinic, we will help you find one. [facility] are conveniently located to serve the needs of you and your family.             Review of your medicines      Our records show that you are taking the medicines listed below. If these are incorrect, please call your family doctor or clinic.        Dose / Directions Last dose taken    albuterol 108 (90 BASE) MCG/ACT Inhaler   Commonly known as:  PROAIR HFA/PROVENTIL HFA/VENTOLIN HFA   Dose:  2 puff   Quantity:  1 Inhaler        Inhale 2 puffs into the lungs every 6 hours as needed for shortness of breath / dyspnea or wheezing   Refills:  0         fexofenadine 180 MG tablet   Commonly known as:  ALLEGRA   Dose:  180 mg   Quantity:  30 tablet        Take 1 tablet (180 mg) by mouth daily   Refills:  0        fluticasone 50 MCG/ACT spray   Commonly known as:  FLONASE   Dose:  2 spray   Quantity:  1 Bottle        Spray 2 sprays into both nostrils daily   Refills:  0        folic acid 1 MG tablet   Commonly known as:  FOLVITE   Dose:  1 mg   Quantity:  100 tablet        Take 1 tablet (1 mg) by mouth daily   Refills:  3        metFORMIN 850 MG tablet   Commonly known as:  GLUCOPHAGE   Dose:  850 mg   Quantity:  180 tablet        Take 1 tablet (850 mg) by mouth 2 times daily (with meals)   Refills:  3        norethindrone-ethinyl estradiol 1-35 MG-MCG per tablet   Commonly known as:  ORTHO-NOVUM 1-35 TAB,NORTREL 1-35 TAB   Dose:  1 tablet   Quantity:  84 tablet        Take 1 tablet by mouth daily   Refills:  3        prenatal multivitamin plus iron 27-0.8 MG Tabs per tablet   Dose:  1 tablet   Quantity:  100 tablet        Take 1 tablet by mouth daily   Refills:  3        sertraline 50 MG tablet   Commonly known as:  ZOLOFT   Quantity:  30 tablet        Take 1/2 tablet (25 mg) for 1 weeks, then increase to 1 tablet orally daily   Refills:  1                Orders Needing Specimen Collection     None      Pending Results     No orders found from 1/9/2018 to 1/12/2018.            Pending Culture Results     No orders found from 1/9/2018 to 1/12/2018.            Pending Results Instructions     If you had any lab results that were not finalized at the time of your Discharge, you can call the ED Lab Result RN at [phone]. You will be contacted by this team for any positive Lab results or changes in treatment. The nurses are available 7 days a week from 10A to 6:30P.  You can leave a message 24 hours per day and they will return your call.        Test Results From Your Hospital Stay               Thank you for choosing [facility]       Thank you for choosing [facility]  for your care. Our goal is always to provide you with excellent care. Hearing back from our patients is one way we can continue to improve our services. Please take a few minutes to complete the written survey that you may receive in the mail after you visit with us. Thank you!        [facility] Information     [facility] gives you secure access to your electronic health record. If you see a primary care provider, you can also send messages to your care team and make appointments. If you have questions, please call your primary care clinic.  If you do not have a primary care provider, please call [phone] and they will assist you.        Care EveryWhere ID     This is your Care EveryWhere ID. This could be used by other organizations to access your [facility] medical records  [id number]        Equal Access to Services     SUSHIL MUHAMMAD : Amanuel Donald, zita desai, alon woods, gus gomez. So [facility] [phone].    ATENCIÓN: Si habla español, tiene a gutierrez disposición servicios gratuitos de asistencia lingüística. Llame al [phone].    We comply with applicable federal civil rights laws and [place] laws. We do not discriminate on the basis of race, color, national origin, age, disability, sex, sexual orientation, or gender identity.            After Visit Summary       This is your record. Keep this with you and show to your community pharmacist(s) and doctor(s) at your next visit.

## 2018-01-12 NOTE — ED PROVIDER NOTES
History     Chief Complaint   Patient presents with     Insect Bites     both feet x 3 weeks, thinks it's fles      HPI  Chloe Obando is a 22 year old female who presents with a rash and started 3.5 to 4 weeks ago and worse over the apst week.  The rash is located on the top of feet and ankles.  It does not seem to be sreading - but did not wrist rash as well last week.  There is a shooting pain associated with the rash and is burning and itching all of the time.  Cat treated for fleas beginning of December and neutered last week and vet did not find any fleas.      Problem List:    Patient Active Problem List    Diagnosis Date Noted     Moderate episode of recurrent major depressive disorder (H) 12/01/2017     Priority: Medium     PCOS (polycystic ovarian syndrome) 03/29/2017     Priority: Medium     Generalized anxiety disorder 11/24/2015     Priority: Medium     Major depressive disorder, single episode, mild (H) 11/24/2015     Priority: Medium     panic attack 05/28/2015     Priority: Medium     Obesity 11/21/2013     Priority: Medium     GERD (gastroesophageal reflux disease) 12/11/2012     Priority: Medium     11/2012: Trial of Prilosec for 8 weeks.       Plantar warts 02/10/2011     Priority: Medium     Other acne 11/05/2007     Priority: Medium     05/07/09: trial of Minocycline 100 mg qd,  Retin-A 0.025% qHS.  06/2011: Off meds for 2 years.  Restart minocycline 100 mg qd and Benzyl peroxide qd.  01/2012: Increase minocycline 100 mg to BID, add on retin-A 0.025%, refer to dermatology.       Irregular menstrual cycle 07/19/2007     Priority: Medium     07/07: skipping months. Likely secondary to immature hypothalamic/pituitary/ovarian axis.       HYPERLIPIDEMIA 10/05/2006     Priority: Medium     Noted in transferred clinic records.  Recommend repeat fasting.  05/07/09:  Lipids improved.  Now within normal range.          Past Medical History:    Past Medical History:   Diagnosis Date     Closed fracture of  unspecified part of humerus      Hematuria as child     Urinary tract infection, site not specified as child       Past Surgical History:    Past Surgical History:   Procedure Laterality Date     TONSILLECTOMY ADULT Bilateral 3/15/2017    Procedure: TONSILLECTOMY ADULT;  Surgeon: Kamini Valdovinos MD;  Location: [facility] OR       Family History:    Family History   Problem Relation Age of Onset     DIABETES Mother      gestational diabetes (history of)     Family History Negative Father      C.A.D. Maternal Grandmother      Quad. Bypass at age 74     DIABETES Maternal Grandmother      Asthma Maternal Grandmother      Lipids Maternal Grandmother      Arthritis Maternal Grandfather      Hypertension Paternal Grandmother      Lipids Paternal Grandmother      cholesterol     Family History Negative Paternal Grandfather        Social History:  Marital Status:  Single [1]  Social History   Substance Use Topics     Smoking status: Never Smoker     Smokeless tobacco: Never Used      Comment: parents don't smoke in the house     Alcohol use No        Medications:      fexofenadine (ALLEGRA) 180 MG tablet   fluticasone (FLONASE) 50 MCG/ACT spray   albuterol (PROAIR HFA/PROVENTIL HFA/VENTOLIN HFA) 108 (90 BASE) MCG/ACT Inhaler   sertraline (ZOLOFT) 50 MG tablet   metFORMIN (GLUCOPHAGE) 850 MG tablet   folic acid (FOLVITE) 1 MG tablet   Prenatal Vit-Fe Fumarate-FA (PRENATAL MULTIVITAMIN  PLUS IRON) 27-0.8 MG TABS per tablet   norethindrone-ethinyl estradiol (ORTHO-NOVUM 1-35 TAB,NORTREL 1-35 TAB) 1-35 MG-MCG per tablet     Review of Systems   Constitutional: Negative for activity change, appetite change, chills, diaphoresis, fatigue and fever.   HENT: Negative for congestion, ear pain and sore throat.    Respiratory: Negative for cough.    Cardiovascular: Negative for chest pain.   Gastrointestinal: Negative for constipation, diarrhea, nausea and vomiting.   Genitourinary: Negative for difficulty urinating.   Skin: Positive for rash.  "  All other systems reviewed and are negative.      Physical Exam   BP: 123/83  Heart Rate: 87  Temp: 98.6  F (37  C)  Resp: 16  Height: 154.9 cm (5' 1\")  Weight: 90.7 kg (200 lb)  SpO2: 98 %    Physical Exam   Constitutional: She appears well-developed and well-nourished. No distress.   HENT:   Head: Normocephalic and atraumatic.   Eyes: Conjunctivae are normal. Right eye exhibits no discharge. Left eye exhibits no discharge.   Cardiovascular: Normal rate, regular rhythm and normal heart sounds.  Exam reveals no gallop and no friction rub.    No murmur heard.  Pulmonary/Chest: Effort normal and breath sounds normal. No respiratory distress. She has no wheezes. She has no rales. She exhibits no tenderness.   Skin: Skin is warm and dry. Rash noted. Rash is papular (scattered papular rash with moderate excoriations noted  - rash is scattered primarly around bilateral lower ankles with  less involvement up to shin area and also there is scattered papules with exocoriations on bilateral wrists and forearms). She is not diaphoretic.   Psychiatric: She has a normal mood and affect.   Nursing note and vitals reviewed.      ED Course     ED Course     Procedures    Labs Ordered and Resulted from Time of ED Arrival Up to the Time of Departure from the ED - No data to display    Assessments & Plan (with Medical Decision Making)     I have reviewed the nursing notes.    I have reviewed the findings, diagnosis, plan and need for follow up with the patient.  Chloe Obando is a 22 year old female who presents with a rash and started 3.5 to 4 weeks ago and worse over the apst week.  The rash is located on the top of feet and ankles.  It does not seem to be sreading - but did not wrist rash as well last week.  There is a shooting pain associated with the rash and is burning and itching all of the time.  Cat treated for fleas beginning of December and neutered last week and vet did not find any fleas.  Exam as noted above.  DDX: " insect bite, atopic dermatitis, cellulitis, scabies -Favor flea bites based on hx and exam.  NO evidence of infection at this time.  Recommend topical ointments and contact vet regarding flea extermination and return if concern for skin infection.     New Prescriptions    No medications on file       Final diagnoses:   Rash - cannot exclude flea bites   rash is without evidence of infection       1/11/2018   [facility] EMERGENCY DEPARTMENT     Didi Berman APRN CNP  01/11/18 2459

## 2018-01-12 NOTE — ED NOTES
Patient here for insect bites on the ankles/feet.  Patient presents ambulatory to the urgent care.

## 2018-01-12 NOTE — DISCHARGE INSTRUCTIONS
Apply neosporin or vaseline or aquaphor to bite marks  Use benadryl for itching  Call vet and discuss treatment for flea eradication  Return if there is concern for skin infection

## 2018-01-31 ENCOUNTER — OFFICE VISIT (OUTPATIENT)
Dept: DERMATOLOGY | Facility: CLINIC | Age: 23
End: 2018-01-31
Payer: COMMERCIAL

## 2018-01-31 VITALS — RESPIRATION RATE: 16 BRPM | SYSTOLIC BLOOD PRESSURE: 116 MMHG | DIASTOLIC BLOOD PRESSURE: 76 MMHG | HEART RATE: 81 BPM

## 2018-01-31 DIAGNOSIS — L70.0 ACNE VULGARIS: Primary | ICD-10-CM

## 2018-01-31 LAB — BETA HCG QUAL IFA URINE: NEGATIVE

## 2018-01-31 PROCEDURE — 99214 OFFICE O/P EST MOD 30 MIN: CPT | Performed by: PHYSICIAN ASSISTANT

## 2018-01-31 PROCEDURE — 84703 CHORIONIC GONADOTROPIN ASSAY: CPT | Performed by: PHYSICIAN ASSISTANT

## 2018-01-31 RX ORDER — DOXYCYCLINE 100 MG/1
100 CAPSULE ORAL 2 TIMES DAILY WITH MEALS
Qty: 50 CAPSULE | Refills: 0 | Status: SHIPPED | OUTPATIENT
Start: 2018-01-31 | End: 2018-03-02

## 2018-01-31 NOTE — Clinical Note
HI Dr Schroeder,  I just want to get your approval for starting a second round of isotretinoin therapy since patient is being treated for depression by you.  Thank you, Yasmine Lyman PA-C

## 2018-01-31 NOTE — LETTER
1/31/2018         RE: Chloe Obando  [address] [address]        Dear Colleague,    Thank you for referring your patient, Chloe Obando, to the [facility]. Please see a copy of my visit note below.    Chloe Obando is a 22 year old year old female patient here today for consult of ance vulgaris by Dr. Schroeder .  Patient reports that the only thing that helped her acne in the past was Isotretinoin. She reports that she finished it about 4 years ago. She also has tried birth control, antibiotics, OTC face/wash and ketoconazole shampoo. Patient has a history of depression which is well controlled with zoloft  Patient has no other skin complaints today.  Remainder of the HPI, Meds, PMH, Allergies, FH, and SH was reviewed in chart.    Pertinent Hx:   Acne Vulgaris   Past Medical History:   Diagnosis Date     Closed fracture of unspecified part of humerus     Left Humerus Fracture     Hematuria as child     Urinary tract infection, site not specified as child    nl renal U/S, no VCUG       Past Surgical History:   Procedure Laterality Date     TONSILLECTOMY ADULT Bilateral 3/15/2017    Procedure: TONSILLECTOMY ADULT;  Surgeon: Kamini Valdovinos MD;  Location: [facility] OR        Family History   Problem Relation Age of Onset     DIABETES Mother      gestational diabetes (history of)     Family History Negative Father      C.A.D. Maternal Grandmother      Quad. Bypass at age 74     DIABETES Maternal Grandmother      Asthma Maternal Grandmother      Lipids Maternal Grandmother      Arthritis Maternal Grandfather      Hypertension Paternal Grandmother      Lipids Paternal Grandmother      cholesterol     Family History Negative Paternal Grandfather        Social History     Social History     Marital status: Single     Spouse name: N/A     Number of children: N/A     Years of education: N/A     Occupational History     Not on file.     Social History Main Topics     Smoking status: Never Smoker      Smokeless tobacco: Never Used      Comment: parents don't smoke in the house     Alcohol use No     Drug use: No     Sexual activity: Yes     Partners: Male     Birth control/ protection: Condom     Other Topics Concern     Parent/Sibling W/ Cabg, Mi Or Angioplasty Before 65f 55m? No     Social History Narrative       Outpatient Encounter Prescriptions as of 1/31/2018   Medication Sig Dispense Refill     doxycycline monohydrate 100 MG capsule Take 1 capsule (100 mg) by mouth 2 times daily (with meals) 50 capsule 0     fexofenadine (ALLEGRA) 180 MG tablet Take 1 tablet (180 mg) by mouth daily 30 tablet 0     fluticasone (FLONASE) 50 MCG/ACT spray Spray 2 sprays into both nostrils daily 1 Bottle 0     albuterol (PROAIR HFA/PROVENTIL HFA/VENTOLIN HFA) 108 (90 BASE) MCG/ACT Inhaler Inhale 2 puffs into the lungs every 6 hours as needed for shortness of breath / dyspnea or wheezing 1 Inhaler 0     sertraline (ZOLOFT) 50 MG tablet Take 1/2 tablet (25 mg) for 1 weeks, then increase to 1 tablet orally daily 30 tablet 1     metFORMIN (GLUCOPHAGE) 850 MG tablet Take 1 tablet (850 mg) by mouth 2 times daily (with meals) 180 tablet 3     folic acid (FOLVITE) 1 MG tablet Take 1 tablet (1 mg) by mouth daily 100 tablet 3     Prenatal Vit-Fe Fumarate-FA (PRENATAL MULTIVITAMIN  PLUS IRON) 27-0.8 MG TABS per tablet Take 1 tablet by mouth daily 100 tablet 3     norethindrone-ethinyl estradiol (ORTHO-NOVUM 1-35 TAB,NORTREL 1-35 TAB) 1-35 MG-MCG per tablet Take 1 tablet by mouth daily 84 tablet 3     No facility-administered encounter medications on file as of 1/31/2018.              Review Of Systems  Skin: As above  Eyes: negative  Ears/Nose/Throat: negative  Respiratory: No shortness of breath, dyspnea on exertion, cough, or hemoptysis  Cardiovascular: negative  Gastrointestinal: negative  Genitourinary: negative  Musculoskeletal: negative  Neurologic: negative  Psychiatric: negative  Hematologic/Lymphatic/Immunologic:  negative  Endocrine: negative      O:   NAD, WDWN, Alert & Oriented, Mood & Affect wnl, Vitals stable   Here today alone   /76 (BP Location: Right arm, Patient Position: Chair, Cuff Size: Adult Large)  Pulse 81  Resp 16   General appearance normal   Vitals stable   Alert, oriented and in no acute distress     1+ inflammatory papules and pustules on back  1+ inflammatory nodules, papules on face       Eyes: Conjunctivae/lids:Normal     ENT: Lips: normal    MSK:Normal    Pulm: Breathing Normal    Neuro/Psych: Orientation:Normal; Mood/Affect:Normal  A/P:  1. Acne Vulgaris   Start doxycyline with food for next 25 days.   Will get approval from Dr. Schroeder that she approved to start isotretinoin given her history of depression.   Standing hcg, CBC, CMP and fasting lipids  Ipledge reviewed with patient and Ipledge consent form complete  Patient place in ipledge system  Contraception: 1. Birth control pills 2. Condoms   Ipledge: [id number]  Return to clinic 30 days  Dry lips and mouth, minor swelling of the eyelids or lips, crusty skin, nosebleeds, GI upset, or thinning of hair may occur. If any of these effects persist or worsen, tell your doctor or pharmacist promptly.   To relieve dry mouth, suck on (sugarless) hard candy or ice chips, chew (sugarless) gum, drink water.   Remember that your doctor has prescribed this medication because he or she has judged that the benefit to you is greater than the risk of side effects. Many people using this medication do not have serious side effects.   Contact office immediately if you have any of these unlikely but serious side effects: mental/mood changes (e.g., depression,  aggressive or violent behavior, and in rare cases, thoughts of suicide), tingling feeling in the skin, quick/severe sun sensitivity, back/joint/muscle pain, signs of infection (e.g., fever, persistent sore throat, painful swallowing, peeling skin on palms/soles.   Isotretinoin may infrequently cause  disease of the pancreatitis, that may rarely be fatal. Stop taking this medication and contact office immediately if you develop: severe stomach pain severe or persistent GI upset,   Stop taking this medication and tell your doctor immediately if you develop these unlikely but very serious side effects: severe headache, vision changes, ear ringing, hearling loss, chest pain, yellowing eyes, skin, dark urine, severe diarrhea, rectal bleeding,   Seek immediate medical attention if you notice any symptoms of a serious allergic reaction.    Accutane is discussed fully with the patient. It is a very effective drug to treat acne vulgaris but has many potential significant side effects. Chief among these are teratogensis, hepatic injury, dyslipidemia and severe drying of the mucous membranes. All of these issues have been discussed in details. Monthly blood tests to monitor lipids and liver functions will be necessary. Expect painful dryness and/or fissuring around the lips, eyes, and other moist areas of the body. Balms may be protective. Contact lens may be too painful to wear temporarily while on this drug. Episodes of significant depression have been reported, including suicidal ideation and attempts in rare cases. It may also cause pseudotumor cerebri and hyperostosis. The patient will report any such changes in mood, depressive symptoms or suicidal thoughts, headaches, joint or bone pains. There is also a possible association with inflammatory bowel disease, although this is unproven at this point.         Again, thank you for allowing me to participate in the care of your patient.        Sincerely,        Yasmine Moreno PA-C

## 2018-01-31 NOTE — MR AVS SNAPSHOT
After Visit Summary   1/31/2018    Chloe Obando    MRN: [id number]           Patient Information     Date Of Birth          1995        Visit Information        Provider Department      1/31/2018 2:40 PM Yasmine Lyman PA-C [facility]        Today's Diagnoses     Acne vulgaris    -  1       Follow-ups after your visit        Your next 10 appointments already scheduled     Mar 02, 2018  8:00 AM CST   Return Visit with Yasmine Lyman PA-C   [facility] ([facility])    [address] [address]   [phone]            Mar 30, 2018  7:40 AM CDT   Return Visit with Yasmine Lyman PA-C   [facility] ([facility])    [address] [address]   [phone]            Apr 26, 2018  7:00 AM CDT   Return Visit with Yasmine Lyman PA-C   [facility] ([facility])    [address] [address]   [phone]              Who to contact     If you have questions or need follow up information about today's clinic visit or your schedule please contact [facility] directly at [phone].  Normal or non-critical lab and imaging results will be communicated to you by [employer], letter or phone within 4 business days after the clinic has received the results. If you do not hear from us within 7 days, please contact the clinic through MyChart or phone. If you have a critical or abnormal lab result, we will notify you by phone as soon as possible.  Submit refill requests through Bee Shield or call your pharmacy and they will forward the refill request to us. Please allow 3 business days for your refill to be completed.          Additional Information About Your Visit        [employer] Information     B[employer] Shield gives you secure access to your electronic health record. If you see a primary care provider, you can also send  messages to your care team and make appointments. If you have questions, please call your primary care clinic.  If you do not have a primary care provider, please call [phone] and they will assist you.        Care EveryWhere ID     This is your Care EveryWhere ID. This could be used by other organizations to access your [facility] medical records  [id number]        Your Vitals Were     Pulse Respirations                81 16           Blood Pressure from Last 3 Encounters:   01/31/18 116/76   01/11/18 123/83   12/31/17 128/82    Weight from Last 3 Encounters:   01/11/18 90.7 kg (200 lb)   12/01/17 94.7 kg (208 lb 12.8 oz)   05/24/17 91.8 kg (202 lb 6.4 oz)              We Performed the Following     Beta HCG qual IFA urine          Today's Medication Changes          These changes are accurate as of 1/31/18 11:59 PM.  If you have any questions, ask your nurse or doctor.               Start taking these medicines.        Dose/Directions    doxycycline monohydrate 100 MG capsule   Used for:  Acne vulgaris   Started by:  Yasmine Lyman PA-C        Dose:  100 mg   Take 1 capsule (100 mg) by mouth 2 times daily (with meals)   Quantity:  50 capsule   Refills:  0            Where to get your medicines      These medications were [facility] [address]  [address] [address]     Phone:  [phone]     doxycycline monohydrate 100 MG capsule                Primary Care Provider Office Phone # Fax #    Mervat Jemma Schroeder [phone] [address] [place] [address]        Equal Access to Services     [facility] AH: Hadii abiel moss hadasho Sojericho, waaxda luqadaha, qaybta kaalmada chuck, gus gomez. So [facility] [phone].    ATENCIÓN: Si habla español, tiene a gutierrez disposición servicios gratuitos de asistencia lingüística. Llame al [phone].    We comply with applicable federal civil rights  laws and [place] laws. We do not discriminate on the basis of race, color, national origin, age, disability, sex, sexual orientation, or gender identity.            Thank you!     Thank you for choosing [facility]  for your care. Our goal is always to provide you with excellent care. Hearing back from our patients is one way we can continue to improve our services. Please take a few minutes to complete the written survey that you may receive in the mail after your visit with us. Thank you!             Your Updated Medication List - Protect others around you: Learn how to safely use, store and throw away your medicines at www.disposemymeds.org.          This list is accurate as of 1/31/18 11:59 PM.  Always use your most recent med list.                   Brand Name Dispense Instructions for use Diagnosis    albuterol 108 (90 BASE) MCG/ACT Inhaler    PROAIR HFA/PROVENTIL HFA/VENTOLIN HFA    1 Inhaler    Inhale 2 puffs into the lungs every 6 hours as needed for shortness of breath / dyspnea or wheezing    Cough       doxycycline monohydrate 100 MG capsule     50 capsule    Take 1 capsule (100 mg) by mouth 2 times daily (with meals)    Acne vulgaris       fexofenadine 180 MG tablet    ALLEGRA    30 tablet    Take 1 tablet (180 mg) by mouth daily    Congestion of paranasal sinus, Post-nasal drip       fluticasone 50 MCG/ACT spray    FLONASE    1 Bottle    Spray 2 sprays into both nostrils daily    Congestion of paranasal sinus, Post-nasal drip       folic acid 1 MG tablet    FOLVITE    100 tablet    Take 1 tablet (1 mg) by mouth daily    PCOS (polycystic ovarian syndrome), Amenorrhea, secondary       metFORMIN 850 MG tablet    GLUCOPHAGE    180 tablet    Take 1 tablet (850 mg) by mouth 2 times daily (with meals)    Amenorrhea, secondary       norethindrone-ethinyl estradiol 1-35 MG-MCG per tablet    ORTHO-NOVUM 1-35 TAB,NORTREL 1-35 TAB    84 tablet    Take 1 tablet by mouth daily    Absence of  menstruation       prenatal multivitamin plus iron 27-0.8 MG Tabs per tablet     100 tablet    Take 1 tablet by mouth daily    PCOS (polycystic ovarian syndrome), Amenorrhea, secondary       sertraline 50 MG tablet    ZOLOFT    30 tablet    Take 1/2 tablet (25 mg) for 1 weeks, then increase to 1 tablet orally daily    Generalized anxiety disorder, Moderate episode of recurrent major depressive disorder (H)

## 2018-01-31 NOTE — NURSING NOTE
"Initial /76 (BP Location: Right arm, Patient Position: Chair, Cuff Size: Adult Large)  Pulse 81  Resp 16 Estimated body mass index is 37.79 kg/(m^2) as calculated from the following:    Height as of 1/11/18: 1.549 m (5' 1\").    Weight as of 1/11/18: 90.7 kg (200 lb). .    Aj BRADSHAW, CMA    "

## 2018-02-04 NOTE — PROGRESS NOTES
Chloe Obando is a 22 year old year old female patient here today for consult of ance vulgaris by Dr. Schroeder .  Patient reports that the only thing that helped her acne in the past was Isotretinoin. She reports that she finished it about 4 years ago. She also has tried birth control, antibiotics, OTC face/wash and ketoconazole shampoo. Patient has a history of depression which is well controlled with zoloft  Patient has no other skin complaints today.  Remainder of the HPI, Meds, PMH, Allergies, FH, and SH was reviewed in chart.    Pertinent Hx:   Acne Vulgaris   Past Medical History:   Diagnosis Date     Closed fracture of unspecified part of humerus     Left Humerus Fracture     Hematuria as child     Urinary tract infection, site not specified as child    nl renal U/S, no VCUG       Past Surgical History:   Procedure Laterality Date     TONSILLECTOMY ADULT Bilateral 3/15/2017    Procedure: TONSILLECTOMY ADULT;  Surgeon: Kamini Valdovinos MD;  Location: [facility] OR        Family History   Problem Relation Age of Onset     DIABETES Mother      gestational diabetes (history of)     Family History Negative Father      C.A.D. Maternal Grandmother      Qu[place]. [place] at age 74     DIABETES Maternal Grandmother      Asthma Maternal Grandmother      Lipids Maternal Grandmother      Arthritis Maternal Grandfather      Hypertension Paternal Grandmother      Lipids Paternal Grandmother      cholesterol     Family History Negative Paternal Grandfather        Social History     Social History     Marital status: Single     Spouse name: N/A     Number of children: N/A     Years of education: N/A     Occupational History     Not on file.     Social History Main Topics     Smoking status: Never Smoker     Smokeless tobacco: Never Used      Comment: parents don't smoke in the house     Alcohol use No     Drug use: No     Sexual activity: Yes     Partners: Male     Birth control/ protection: Condom     Other Topics Concern     Parent/Sibling W/  Cabg, Mi Or Angioplasty Before 65f 55m? No     Social History Narrative       Outpatient Encounter Prescriptions as of 1/31/2018   Medication Sig Dispense Refill     doxycycline monohydrate 100 MG capsule Take 1 capsule (100 mg) by mouth 2 times daily (with meals) 50 capsule 0     fexofenadine (ALLEGRA) 180 MG tablet Take 1 tablet (180 mg) by mouth daily 30 tablet 0     fluticasone (FLONASE) 50 MCG/ACT spray Spray 2 sprays into both nostrils daily 1 Bottle 0     albuterol (PROAIR HFA/PROVENTIL HFA/VENTOLIN HFA) 108 (90 BASE) MCG/ACT Inhaler Inhale 2 puffs into the lungs every 6 hours as needed for shortness of breath / dyspnea or wheezing 1 Inhaler 0     sertraline (ZOLOFT) 50 MG tablet Take 1/2 tablet (25 mg) for 1 weeks, then increase to 1 tablet orally daily 30 tablet 1     metFORMIN (GLUCOPHAGE) 850 MG tablet Take 1 tablet (850 mg) by mouth 2 times daily (with meals) 180 tablet 3     folic acid (FOLVITE) 1 MG tablet Take 1 tablet (1 mg) by mouth daily 100 tablet 3     Prenatal Vit-Fe Fumarate-FA (PRENATAL MULTIVITAMIN  PLUS IRON) 27-0.8 MG TABS per tablet Take 1 tablet by mouth daily 100 tablet 3     norethindrone-ethinyl estradiol (ORTHO-NOVUM 1-35 TAB,NORTREL 1-35 TAB) 1-35 MG-MCG per tablet Take 1 tablet by mouth daily 84 tablet 3     No facility-administered encounter medications on file as of 1/31/2018.              Review Of Systems  Skin: As above  Eyes: negative  Ears/Nose/Throat: negative  Respiratory: No shortness of breath, dyspnea on exertion, cough, or hemoptysis  Cardiovascular: negative  Gastrointestinal: negative  Genitourinary: negative  Musculoskeletal: negative  Neurologic: negative  Psychiatric: negative  Hematologic/Lymphatic/Immunologic: negative  Endocrine: negative      O:   NAD, WDWN, Alert & Oriented, Mood & Affect wnl, Vitals stable   Here today alone   /76 (BP Location: Right arm, Patient Position: Chair, Cuff Size: Adult Large)  Pulse 81  Resp 16   General appearance  normal   Vitals stable   Alert, oriented and in no acute distress     1+ inflammatory papules and pustules on back  1+ inflammatory nodules, papules on face       Eyes: Conjunctivae/lids:Normal     ENT: Lips: normal    MSK:Normal    Pulm: Breathing Normal    Neuro/Psych: Orientation:Normal; Mood/Affect:Normal  A/P:  1. Acne Vulgaris   Start doxycyline with food for next 25 days.   Will get approval from Dr. Schroeder that she approved to start isotretinoin given her history of depression.   Standing hcg, CBC, CMP and fasting lipids  Ipledge reviewed with patient and Ipledge consent form complete  Patient place in [employer] system  Contraception: 1. Birth control pills 2. Condoms   Ipledge: [id number]  Return to clinic 30 days  Dry lips and mouth, minor swelling of the eyelids or lips, crusty skin, nosebleeds, GI upset, or thinning of hair may occur. If any of these effects persist or worsen, tell your doctor or pharmacist promptly.   To relieve dry mouth, suck on (sugarless) hard candy or ice chips, chew (sugarless) gum, drink water.   Remember that your doctor has prescribed this medication because he or she has judged that the benefit to you is greater than the risk of side effects. Many people using this medication do not have serious side effects.   Contact office immediately if you have any of these unlikely but serious side effects: mental/mood changes (e.g., depression,  aggressive or violent behavior, and in rare cases, thoughts of suicide), tingling feeling in the skin, quick/severe sun sensitivity, back/joint/muscle pain, signs of infection (e.g., fever, persistent sore throat, painful swallowing, peeling skin on palms/soles.   Isotretinoin may infrequently cause disease of the pancreatitis, that may rarely be fatal. Stop taking this medication and contact office immediately if you develop: severe stomach pain severe or persistent GI upset,   Stop taking this medication and tell your doctor immediately if you  develop these unlikely but very serious side effects: severe headache, vision changes, ear ringing, hearling loss, chest pain, yellowing eyes, skin, dark urine, severe diarrhea, rectal bleeding,   Seek immediate medical attention if you notice any symptoms of a serious allergic reaction.    Accutane is discussed fully with the patient. It is a very effective drug to treat acne vulgaris but has many potential significant side effects. Chief among these are teratogensis, hepatic injury, dyslipidemia and severe drying of the mucous membranes. All of these issues have been discussed in details. Monthly blood tests to monitor lipids and liver functions will be necessary. Expect painful dryness and/or fissuring around the lips, eyes, and other moist areas of the body. Balms may be protective. Contact lens may be too painful to wear temporarily while on this drug. Episodes of significant depression have been reported, including suicidal ideation and attempts in rare cases. It may also cause pseudotumor cerebri and hyperostosis. The patient will report any such changes in mood, depressive symptoms or suicidal thoughts, headaches, joint or bone pains. There is also a possible association with inflammatory bowel disease, although this is unproven at this point.

## 2018-03-02 ENCOUNTER — OFFICE VISIT (OUTPATIENT)
Dept: DERMATOLOGY | Facility: CLINIC | Age: 23
End: 2018-03-02
Payer: COMMERCIAL

## 2018-03-02 VITALS
SYSTOLIC BLOOD PRESSURE: 117 MMHG | HEART RATE: 86 BPM | WEIGHT: 215 LBS | OXYGEN SATURATION: 96 % | BODY MASS INDEX: 40.59 KG/M2 | DIASTOLIC BLOOD PRESSURE: 74 MMHG | HEIGHT: 61 IN | TEMPERATURE: 96.5 F

## 2018-03-02 DIAGNOSIS — L70.0 ACNE VULGARIS: Primary | ICD-10-CM

## 2018-03-02 LAB
ALBUMIN SERPL-MCNC: 3.8 G/DL (ref 3.4–5)
ALP SERPL-CCNC: 79 U/L (ref 40–150)
ALT SERPL W P-5'-P-CCNC: 57 U/L (ref 0–50)
ANION GAP SERPL CALCULATED.3IONS-SCNC: 7 MMOL/L (ref 3–14)
AST SERPL W P-5'-P-CCNC: 27 U/L (ref 0–45)
BETA HCG QUAL IFA URINE: NEGATIVE
BILIRUB SERPL-MCNC: 0.8 MG/DL (ref 0.2–1.3)
BUN SERPL-MCNC: 11 MG/DL (ref 7–30)
CALCIUM SERPL-MCNC: 9.3 MG/DL (ref 8.5–10.1)
CHLORIDE SERPL-SCNC: 103 MMOL/L (ref 94–109)
CHOLEST SERPL-MCNC: 180 MG/DL
CO2 SERPL-SCNC: 26 MMOL/L (ref 20–32)
CREAT SERPL-MCNC: 0.59 MG/DL (ref 0.52–1.04)
ERYTHROCYTE [DISTWIDTH] IN BLOOD BY AUTOMATED COUNT: 13.6 % (ref 10–15)
GFR SERPL CREATININE-BSD FRML MDRD: >90 ML/MIN/1.7M2
GLUCOSE SERPL-MCNC: 125 MG/DL (ref 70–99)
HCT VFR BLD AUTO: 41.4 % (ref 35–47)
HDLC SERPL-MCNC: 40 MG/DL
HGB BLD-MCNC: 14 G/DL (ref 11.7–15.7)
LDLC SERPL CALC-MCNC: 117 MG/DL
MCH RBC QN AUTO: 29.5 PG (ref 26.5–33)
MCHC RBC AUTO-ENTMCNC: 33.8 G/DL (ref 31.5–36.5)
MCV RBC AUTO: 87 FL (ref 78–100)
NONHDLC SERPL-MCNC: 140 MG/DL
PLATELET # BLD AUTO: 338 10E9/L (ref 150–450)
POTASSIUM SERPL-SCNC: 4.4 MMOL/L (ref 3.4–5.3)
PROT SERPL-MCNC: 8.3 G/DL (ref 6.8–8.8)
RBC # BLD AUTO: 4.74 10E12/L (ref 3.8–5.2)
SODIUM SERPL-SCNC: 136 MMOL/L (ref 133–144)
TRIGL SERPL-MCNC: 117 MG/DL
WBC # BLD AUTO: 10.3 10E9/L (ref 4–11)

## 2018-03-02 PROCEDURE — 85027 COMPLETE CBC AUTOMATED: CPT | Performed by: PHYSICIAN ASSISTANT

## 2018-03-02 PROCEDURE — 84703 CHORIONIC GONADOTROPIN ASSAY: CPT | Performed by: PHYSICIAN ASSISTANT

## 2018-03-02 PROCEDURE — 80061 LIPID PANEL: CPT | Performed by: PHYSICIAN ASSISTANT

## 2018-03-02 PROCEDURE — 99213 OFFICE O/P EST LOW 20 MIN: CPT | Performed by: PHYSICIAN ASSISTANT

## 2018-03-02 PROCEDURE — 36415 COLL VENOUS BLD VENIPUNCTURE: CPT | Performed by: PHYSICIAN ASSISTANT

## 2018-03-02 PROCEDURE — 80053 COMPREHEN METABOLIC PANEL: CPT | Performed by: PHYSICIAN ASSISTANT

## 2018-03-02 RX ORDER — ISOTRETINOIN 40 MG/1
40 CAPSULE ORAL
Qty: 30 CAPSULE | Refills: 0 | Status: SHIPPED | OUTPATIENT
Start: 2018-03-02 | End: 2018-03-30

## 2018-03-02 NOTE — NURSING NOTE
"Initial /74  Pulse 86  Temp 96.5  F (35.8  C) (Tympanic)  Ht 1.549 m (5' 1\")  Wt 97.5 kg (215 lb)  SpO2 96%  BMI 40.62 kg/m2 Estimated body mass index is 40.62 kg/(m^2) as calculated from the following:    Height as of this encounter: 1.549 m (5' 1\").    Weight as of this encounter: 97.5 kg (215 lb). .    Leonila Garcia LPN    "

## 2018-03-02 NOTE — MR AVS SNAPSHOT
After Visit Summary   3/2/2018    Chloe Obando    MRN: [id number]           Patient Information     Date Of Birth          1995        Visit Information        Provider Department      3/2/2018 8:00 AM Yasmine Lyman PA-C [facility]        Today's Diagnoses     Acne vulgaris    -  1       Follow-ups after your visit        Your next 10 appointments already scheduled     Mar 30, 2018  7:40 AM CDT   Return Visit with Yasmine Lyman PA-C   [facility]    [address] [address]   [phone]            Apr 26, 2018  7:00 AM CDT   Return Visit with Yasmine Lyman PA-C   [facility] ([facility]    [address] [address]   [phone]              Future tests that were ordered for you today     Open Standing Orders        Priority Remaining Interval Expires Ordered    CBC with platelets Routine 8/9 monthly  3/2/2019 3/2/2018    Comprehensive metabolic panel Routine 8/9 monthly  3/2/2019 3/2/2018    Lipid panel reflex to direct LDL Non-fasting Routine 8/9 monthly  3/2/2019 3/2/2018            Who to contact     If you have questions or need follow up information about today's clinic visit or your schedule please contact [facility] directly at [phone].  Normal or non-critical lab and imaging results will be communicated to you by MyChart, letter or phone within 4 business days after the clinic has received the results. If you do not hear from us within 7 days, please contact the clinic through MyChart or phone. If you have a critical or abnormal lab result, we will notify you by phone as soon as possible.  Submit refill requests through [employer] or call your pharmacy and they will forward the refill request to us. Please allow 3 business days for your refill to be completed.          Additional Information About Your Visit       "  [employer] Information     [employer] gives you secure access to your electronic health record. If you see a primary care provider, you can also send messages to your care team and make appointments. If you have questions, please call your primary care clinic.  If you do not have a primary care provider, please call [phone] and they will assist you.        Care EveryWhere ID     This is your Care EveryWhere ID. This could be used by other organizations to access your [facility] medical records  [id number]        Your Vitals Were     Pulse Temperature Height Pulse Oximetry BMI (Body Mass Index)       86 96.5  F (35.8  C) (Tympanic) 1.549 m (5' 1\") 96% 40.62 kg/m2        Blood Pressure from Last 3 Encounters:   03/02/18 117/74   01/31/18 116/76   01/11/18 123/83    Weight from Last 3 Encounters:   03/02/18 97.5 kg (215 lb)   01/11/18 90.7 kg (200 lb)   12/01/17 94.7 kg (208 lb 12.8 oz)              We Performed the Following     Beta HCG qual IFA urine          Today's Medication Changes          These changes are accurate as of 3/2/18  8:18 AM.  If you have any questions, ask your nurse or doctor.               Start taking these medicines.        Dose/Directions    ISOtretinoin 40 MG capsule   Commonly known as:  ACCUTANE   Used for:  Acne vulgaris   Started by:  Yasmine Lyman PA-C        Dose:  40 mg   Take 1 capsule (40 mg) by mouth daily with food   Quantity:  30 capsule   Refills:  0            Where to get your medicines      Call your pharmacy to confirm that your medication is ready for pickup. It may take up to 24 hours for them to receive the prescription. If the prescription is not ready within 3 business days, please contact your clinic or your provider.     We will let you know when these medications are ready. If you don't hear back within 3 business days, please contact us.     ISOtretinoin 40 MG capsule                Primary Care Provider Office Phone # Fax #    Mervat Schroeder,  " [phone]       [address] [place] [address]        Equal Access to Services     SUSHIL MUHAMMAD : Hadii aad ku hadcarmencitadavy Shabana, waningda luqsalo, qaybta kaalmada chuck, gus sebastianin hayaan joanmame day laBonniesuzan gomez. So [facility] [phone].    ATENCIÓN: Si habla español, tiene a gutierrez disposición servicios gratuitos de asistencia lingüística. Nonaame al [phone].    We comply with applicable federal civil rights laws and [place] laws. We do not discriminate on the basis of race, color, national origin, age, disability, sex, sexual orientation, or gender identity.            Thank you!     Thank you for choosing [facility]  for your care. Our goal is always to provide you with excellent care. Hearing back from our patients is one way we can continue to improve our services. Please take a few minutes to complete the written survey that you may receive in the mail after your visit with us. Thank you!             Your Updated Medication List - Protect others around you: Learn how to safely use, store and throw away your medicines at www.disposemymeds.org.          This list is accurate as of 3/2/18  8:18 AM.  Always use your most recent med list.                   Brand Name Dispense Instructions for use Diagnosis    albuterol 108 (90 BASE) MCG/ACT Inhaler    PROAIR HFA/PROVENTIL HFA/VENTOLIN HFA    1 Inhaler    Inhale 2 puffs into the lungs every 6 hours as needed for shortness of breath / dyspnea or wheezing    Cough       ISOtretinoin 40 MG capsule    ACCUTANE    30 capsule    Take 1 capsule (40 mg) by mouth daily with food    Acne vulgaris       metFORMIN 850 MG tablet    GLUCOPHAGE    180 tablet    Take 1 tablet (850 mg) by mouth 2 times daily (with meals)    Amenorrhea, secondary       norethindrone-ethinyl estradiol 1-35 MG-MCG per tablet    ORTHO-NOVUM 1-35 TAB,NORTREL 1-35 TAB    84 tablet    Take 1 tablet by mouth daily    Absence of menstruation       sertraline 50 MG tablet     ZOLOFT    30 tablet    Take 1/2 tablet (25 mg) for 1 weeks, then increase to 1 tablet orally daily    Generalized anxiety disorder, Moderate episode of recurrent major depressive disorder (H)

## 2018-03-02 NOTE — Clinical Note
HI Dr. Schroeder,  I just want to make sure that you don't have any concerns with patient starting isotretinoin. She reports that her mood is well controlled with zoloft that you started in December. She knows to monitor her mood and has not had any issues with her mood in the past on this medication. Thank you,  Yasmine Lyman PA-C

## 2018-03-02 NOTE — LETTER
3/2/2018         RE: Chloe Obando  [address] [facility] [id number]        Dear Colleague,    Thank you for referring your patient, Chloe Obando, to the [facility]. Please see a copy of my visit note below.    Chloe Obando is a 22 year old year old female patient here today for acne vulgaris.  Patient reports that the only thing that helped her acne in the past was Isotretinoin. She is here today to start her second course.  Patient has a history of depression which is well controlled with zoloft.  Remainder of the HPI, Meds, PMH, Allergies, FH, and SH was reviewed in chart.    Pertinent Hx:   Acne Vulgaris   Past Medical History:   Diagnosis Date     Closed fracture of unspecified part of humerus     Left Humerus Fracture     Hematuria as child     Urinary tract infection, site not specified as child    nl renal U/S, no VCUG       Past Surgical History:   Procedure Laterality Date     TONSILLECTOMY ADULT Bilateral 3/15/2017    Procedure: TONSILLECTOMY ADULT;  Surgeon: Kamini Valdovinos MD;  Location: [facility] OR        Family History   Problem Relation Age of Onset     DIABETES Mother      gestational diabetes (history of)     Family History Negative Father      C.A.D. Maternal Grandmother      Quad. Bypass at age 74     DIABETES Maternal Grandmother      Asthma Maternal Grandmother      Lipids Maternal Grandmother      Arthritis Maternal Grandfather      Hypertension Paternal Grandmother      Lipids Paternal Grandmother      cholesterol     Family History Negative Paternal Grandfather        Social History     Social History     Marital status: Single     Spouse name: N/A     Number of children: N/A     Years of education: N/A     Occupational History     Not on file.     Social History Main Topics     Smoking status: Never Smoker     Smokeless tobacco: Never Used      Comment: parents don't smoke in the house     Alcohol use No     Drug use: No     Sexual activity: Yes     Partners: Male      Birth control/ protection: Condom     Other Topics Concern     Parent/Sibling W/ Cabg, Mi Or Angioplasty Before 65f 55m? No     Social History Narrative       Outpatient Encounter Prescriptions as of 3/2/2018   Medication Sig Dispense Refill     ISOtretinoin (ACCUTANE) 40 MG capsule Take 1 capsule (40 mg) by mouth daily with food 30 capsule 0     sertraline (ZOLOFT) 50 MG tablet Take 1/2 tablet (25 mg) for 1 weeks, then increase to 1 tablet orally daily 30 tablet 1     norethindrone-ethinyl estradiol (ORTHO-NOVUM 1-35 TAB,NORTREL 1-35 TAB) 1-35 MG-MCG per tablet Take 1 tablet by mouth daily 84 tablet 3     [DISCONTINUED] doxycycline monohydrate 100 MG capsule Take 1 capsule (100 mg) by mouth 2 times daily (with meals) 50 capsule 0     [DISCONTINUED] fexofenadine (ALLEGRA) 180 MG tablet Take 1 tablet (180 mg) by mouth daily 30 tablet 0     [DISCONTINUED] fluticasone (FLONASE) 50 MCG/ACT spray Spray 2 sprays into both nostrils daily 1 Bottle 0     albuterol (PROAIR HFA/PROVENTIL HFA/VENTOLIN HFA) 108 (90 BASE) MCG/ACT Inhaler Inhale 2 puffs into the lungs every 6 hours as needed for shortness of breath / dyspnea or wheezing (Patient not taking: Reported on 3/2/2018) 1 Inhaler 0     metFORMIN (GLUCOPHAGE) 850 MG tablet Take 1 tablet (850 mg) by mouth 2 times daily (with meals) (Patient not taking: Reported on 3/2/2018) 180 tablet 3     [DISCONTINUED] folic acid (FOLVITE) 1 MG tablet Take 1 tablet (1 mg) by mouth daily 100 tablet 3     [DISCONTINUED] Prenatal Vit-Fe Fumarate-FA (PRENATAL MULTIVITAMIN  PLUS IRON) 27-0.8 MG TABS per tablet Take 1 tablet by mouth daily 100 tablet 3     No facility-administered encounter medications on file as of 3/2/2018.              Review Of Systems  Skin: As above  Eyes: negative  Ears/Nose/Throat: negative  Respiratory: No shortness of breath, dyspnea on exertion, cough, or hemoptysis  Cardiovascular: negative  Gastrointestinal: negative  Genitourinary: negative  Musculoskeletal:  "negative  Neurologic: negative  Psychiatric: negative  Hematologic/Lymphatic/Immunologic: negative  Endocrine: negative      O:   NAD, WDWN, Alert & Oriented, Mood & Affect wnl, Vitals stable   Here today alone   /74  Pulse 86  Temp 96.5  F (35.8  C) (Tympanic)  Ht 1.549 m (5' 1\")  Wt 97.5 kg (215 lb)  SpO2 96%  BMI 40.62 kg/m2   General appearance normal   Vitals stable   Alert, oriented and in no acute distress     1+ inflammatory papules and pustules on back  1+ inflammatory nodules, papules on face       Eyes: Conjunctivae/lids:Normal     ENT: Lips: normal    MSK:Normal    Pulm: Breathing Normal    Neuro/Psych: Orientation:Normal; Mood/Affect:Normal  A/P:  1. Acne Vulgaris   Will start isotretinoin 40 mg daily.   Patient is aware of depression side effects and she will make us or PCP aware if mood changes. She notes that it is well controlled and did not have any issues with mood in the past with isotretinoin.   Standing hcg, CBC, CMP and fasting lipids  Ipledge reviewed with patient and Ipledge consent form complete  Patient place in ipledge system  Contraception: 1. Birth control pills 2. Condoms   Ipledge: [id number]  Return to clinic 30 days  Dry lips and mouth, minor swelling of the eyelids or lips, crusty skin, nosebleeds, GI upset, or thinning of hair may occur. If any of these effects persist or worsen, tell your doctor or pharmacist promptly.   To relieve dry mouth, suck on (sugarless) hard candy or ice chips, chew (sugarless) gum, drink water.   Remember that your doctor has prescribed this medication because he or she has judged that the benefit to you is greater than the risk of side effects. Many people using this medication do not have serious side effects.   Contact office immediately if you have any of these unlikely but serious side effects: mental/mood changes (e.g., depression,  aggressive or violent behavior, and in rare cases, thoughts of suicide), tingling feeling in the skin, " quick/severe sun sensitivity, back/joint/muscle pain, signs of infection (e.g., fever, persistent sore throat, painful swallowing, peeling skin on palms/soles.   Isotretinoin may infrequently cause disease of the pancreatitis, that may rarely be fatal. Stop taking this medication and contact office immediately if you develop: severe stomach pain severe or persistent GI upset,   Stop taking this medication and tell your doctor immediately if you develop these unlikely but very serious side effects: severe headache, vision changes, ear ringing, hearling loss, chest pain, yellowing eyes, skin, dark urine, severe diarrhea, rectal bleeding,   Seek immediate medical attention if you notice any symptoms of a serious allergic reaction.     Accutane is discussed fully with the patient. It is a very effective drug to treat acne vulgaris but has many potential significant side effects. Chief among these are teratogensis, hepatic injury, dyslipidemia and severe drying of the mucous membranes. All of these issues have been discussed in details. Monthly blood tests to monitor lipids and liver functions will be necessary. Expect painful dryness and/or fissuring around the lips, eyes, and other moist areas of the body. Balms may be protective. Contact lens may be too painful to wear temporarily while on this drug. Episodes of significant depression have been reported, including suicidal ideation and attempts in rare cases. It may also cause pseudotumor cerebri and hyperostosis. The patient will report any such changes in mood, depressive symptoms or suicidal thoughts, headaches, joint or bone pains. There is also a possible association with inflammatory bowel disease, although this is unproven at this point.        Again, thank you for allowing me to participate in the care of your patient.        Sincerely,        Yasmine Moreno PA-C

## 2018-03-02 NOTE — PROGRESS NOTES
Chloe Obando is a 22 year old year old female patient here today for acne vulgaris.  Patient reports that the only thing that helped her acne in the past was Isotretinoin. She is here today to start her second course.  Patient has a history of depression which is well controlled with zoloft.  Remainder of the HPI, Meds, PMH, Allergies, FH, and SH was reviewed in chart.    Pertinent Hx:   Acne Vulgaris   Past Medical History:   Diagnosis Date     Closed fracture of unspecified part of humerus     Left Humerus Fracture     Hematuria as child     Urinary tract infection, site not specified as child    nl renal U/S, no VCUG       Past Surgical History:   Procedure Laterality Date     TONSILLECTOMY ADULT Bilateral 3/15/2017    Procedure: TONSILLECTOMY ADULT;  Surgeon: Kamini Valdovinos MD;  Location: [facility] OR        Family History   Problem Relation Age of Onset     DIABETES Mother      gestational diabetes (history of)     Family History Negative Father      C.A.D. Maternal Grandmother      Quad. Bypass at age 74     DIABETES Maternal Grandmother      Asthma Maternal Grandmother      Lipids Maternal Grandmother      Arthritis Maternal Grandfather      Hypertension Paternal Grandmother      Lipids Paternal Grandmother      cholesterol     Family History Negative Paternal Grandfather        Social History     Social History     Marital status: Single     Spouse name: N/A     Number of children: N/A     Years of education: N/A     Occupational History     Not on file.     Social History Main Topics     Smoking status: Never Smoker     Smokeless tobacco: Never Used      Comment: parents don't smoke in the house     Alcohol use No     Drug use: No     Sexual activity: Yes     Partners: Male     Birth control/ protection: Condom     Other Topics Concern     Parent/Sibling W/ Cabg, Mi Or Angioplasty Before 65f 55m? No     Social History Narrative       Outpatient Encounter Prescriptions as of 3/2/2018   Medication Sig Dispense  Refill     ISOtretinoin (ACCUTANE) 40 MG capsule Take 1 capsule (40 mg) by mouth daily with food 30 capsule 0     sertraline (ZOLOFT) 50 MG tablet Take 1/2 tablet (25 mg) for 1 weeks, then increase to 1 tablet orally daily 30 tablet 1     norethindrone-ethinyl estradiol (ORTHO-NOVUM 1-35 TAB,NORTREL 1-35 TAB) 1-35 MG-MCG per tablet Take 1 tablet by mouth daily 84 tablet 3     [DISCONTINUED] doxycycline monohydrate 100 MG capsule Take 1 capsule (100 mg) by mouth 2 times daily (with meals) 50 capsule 0     [DISCONTINUED] fexofenadine (ALLEGRA) 180 MG tablet Take 1 tablet (180 mg) by mouth daily 30 tablet 0     [DISCONTINUED] fluticasone (FLONASE) 50 MCG/ACT spray Spray 2 sprays into both nostrils daily 1 Bottle 0     albuterol (PROAIR HFA/PROVENTIL HFA/VENTOLIN HFA) 108 (90 BASE) MCG/ACT Inhaler Inhale 2 puffs into the lungs every 6 hours as needed for shortness of breath / dyspnea or wheezing (Patient not taking: Reported on 3/2/2018) 1 Inhaler 0     metFORMIN (GLUCOPHAGE) 850 MG tablet Take 1 tablet (850 mg) by mouth 2 times daily (with meals) (Patient not taking: Reported on 3/2/2018) 180 tablet 3     [DISCONTINUED] folic acid (FOLVITE) 1 MG tablet Take 1 tablet (1 mg) by mouth daily 100 tablet 3     [DISCONTINUED] Prenatal Vit-Fe Fumarate-FA (PRENATAL MULTIVITAMIN  PLUS IRON) 27-0.8 MG TABS per tablet Take 1 tablet by mouth daily 100 tablet 3     No facility-administered encounter medications on file as of 3/2/2018.              Review Of Systems  Skin: As above  Eyes: negative  Ears/Nose/Throat: negative  Respiratory: No shortness of breath, dyspnea on exertion, cough, or hemoptysis  Cardiovascular: negative  Gastrointestinal: negative  Genitourinary: negative  Musculoskeletal: negative  Neurologic: negative  Psychiatric: negative  Hematologic/Lymphatic/Immunologic: negative  Endocrine: negative      O:   NAD, WDWN, Alert & Oriented, Mood & Affect wnl, Vitals stable   Here today alone   /74  Pulse 86   "Temp 96.5  F (35.8  C) (Tympanic)  Ht 1.549 m (5' 1\")  Wt 97.5 kg (215 lb)  SpO2 96%  BMI 40.62 kg/m2   General appearance normal   Vitals stable   Alert, oriented and in no acute distress     1+ inflammatory papules and pustules on back  1+ inflammatory nodules, papules on face       Eyes: Conjunctivae/lids:Normal     ENT: Lips: normal    MSK:Normal    Pulm: Breathing Normal    Neuro/Psych: Orientation:Normal; Mood/Affect:Normal  A/P:  1. Acne Vulgaris   Will start isotretinoin 40 mg daily.   Patient is aware of depression side effects and she will make us or PCP aware if mood changes. She notes that it is well controlled and did not have any issues with mood in the past with isotretinoin.   Standing hcg, CBC, CMP and fasting lipids  Ipledge reviewed with patient and Ipledge consent form complete  Patient place in ipledge system  Contraception: 1. Birth control pills 2. Condoms   Ipledge: [id number]  Return to clinic 30 days  Dry lips and mouth, minor swelling of the eyelids or lips, crusty skin, nosebleeds, GI upset, or thinning of hair may occur. If any of these effects persist or worsen, tell your doctor or pharmacist promptly.   To relieve dry mouth, suck on (sugarless) hard candy or ice chips, chew (sugarless) gum, drink water.   Remember that your doctor has prescribed this medication because he or she has judged that the benefit to you is greater than the risk of side effects. Many people using this medication do not have serious side effects.   Contact office immediately if you have any of these unlikely but serious side effects: mental/mood changes (e.g., depression,  aggressive or violent behavior, and in rare cases, thoughts of suicide), tingling feeling in the skin, quick/severe sun sensitivity, back/joint/muscle pain, signs of infection (e.g., fever, persistent sore throat, painful swallowing, peeling skin on palms/soles.   Isotretinoin may infrequently cause disease of the pancreatitis, that may " rarely be fatal. Stop taking this medication and contact office immediately if you develop: severe stomach pain severe or persistent GI upset,   Stop taking this medication and tell your doctor immediately if you develop these unlikely but very serious side effects: severe headache, vision changes, ear ringing, hearling loss, chest pain, yellowing eyes, skin, dark urine, severe diarrhea, rectal bleeding,   Seek immediate medical attention if you notice any symptoms of a serious allergic reaction.     Accutane is discussed fully with the patient. It is a very effective drug to treat acne vulgaris but has many potential significant side effects. Chief among these are teratogensis, hepatic injury, dyslipidemia and severe drying of the mucous membranes. All of these issues have been discussed in details. Monthly blood tests to monitor lipids and liver functions will be necessary. Expect painful dryness and/or fissuring around the lips, eyes, and other moist areas of the body. Balms may be protective. Contact lens may be too painful to wear temporarily while on this drug. Episodes of significant depression have been reported, including suicidal ideation and attempts in rare cases. It may also cause pseudotumor cerebri and hyperostosis. The patient will report any such changes in mood, depressive symptoms or suicidal thoughts, headaches, joint or bone pains. There is also a possible association with inflammatory bowel disease, although this is unproven at this point.

## 2018-03-04 ENCOUNTER — MYC MEDICAL ADVICE (OUTPATIENT)
Dept: DERMATOLOGY | Facility: CLINIC | Age: 23
End: 2018-03-04

## 2018-03-05 NOTE — TELEPHONE ENCOUNTER
I spoke to patient and explained ipledge process to her and advised she needs to contact pharmacy to  her rx now. Patient verbalized understanding. Celestina Alfonso RN

## 2018-03-30 ENCOUNTER — OFFICE VISIT (OUTPATIENT)
Dept: DERMATOLOGY | Facility: CLINIC | Age: 23
End: 2018-03-30
Payer: COMMERCIAL

## 2018-03-30 VITALS — OXYGEN SATURATION: 96 % | HEART RATE: 76 BPM | SYSTOLIC BLOOD PRESSURE: 116 MMHG | DIASTOLIC BLOOD PRESSURE: 61 MMHG

## 2018-03-30 DIAGNOSIS — L70.0 ACNE VULGARIS: ICD-10-CM

## 2018-03-30 LAB
ALBUMIN SERPL-MCNC: 3.6 G/DL (ref 3.4–5)
ALP SERPL-CCNC: 69 U/L (ref 40–150)
ALT SERPL W P-5'-P-CCNC: 55 U/L (ref 0–50)
ANION GAP SERPL CALCULATED.3IONS-SCNC: 8 MMOL/L (ref 3–14)
AST SERPL W P-5'-P-CCNC: 37 U/L (ref 0–45)
BETA HCG QUAL IFA URINE: NEGATIVE
BILIRUB SERPL-MCNC: 0.8 MG/DL (ref 0.2–1.3)
BUN SERPL-MCNC: 10 MG/DL (ref 7–30)
CALCIUM SERPL-MCNC: 8.5 MG/DL (ref 8.5–10.1)
CHLORIDE SERPL-SCNC: 101 MMOL/L (ref 94–109)
CHOLEST SERPL-MCNC: 225 MG/DL
CO2 SERPL-SCNC: 24 MMOL/L (ref 20–32)
CREAT SERPL-MCNC: 0.51 MG/DL (ref 0.52–1.04)
ERYTHROCYTE [DISTWIDTH] IN BLOOD BY AUTOMATED COUNT: 13.2 % (ref 10–15)
GFR SERPL CREATININE-BSD FRML MDRD: >90 ML/MIN/1.7M2
GLUCOSE SERPL-MCNC: 202 MG/DL (ref 70–99)
HCT VFR BLD AUTO: 38.9 % (ref 35–47)
HDLC SERPL-MCNC: 34 MG/DL
HGB BLD-MCNC: 13.1 G/DL (ref 11.7–15.7)
LDLC SERPL CALC-MCNC: 161 MG/DL
MCH RBC QN AUTO: 29.4 PG (ref 26.5–33)
MCHC RBC AUTO-ENTMCNC: 33.7 G/DL (ref 31.5–36.5)
MCV RBC AUTO: 87 FL (ref 78–100)
NONHDLC SERPL-MCNC: 191 MG/DL
PLATELET # BLD AUTO: 322 10E9/L (ref 150–450)
POTASSIUM SERPL-SCNC: 3.7 MMOL/L (ref 3.4–5.3)
PROT SERPL-MCNC: 7.8 G/DL (ref 6.8–8.8)
RBC # BLD AUTO: 4.45 10E12/L (ref 3.8–5.2)
SODIUM SERPL-SCNC: 133 MMOL/L (ref 133–144)
TRIGL SERPL-MCNC: 152 MG/DL
WBC # BLD AUTO: 9.3 10E9/L (ref 4–11)

## 2018-03-30 PROCEDURE — 85027 COMPLETE CBC AUTOMATED: CPT | Performed by: PHYSICIAN ASSISTANT

## 2018-03-30 PROCEDURE — 99213 OFFICE O/P EST LOW 20 MIN: CPT | Performed by: PHYSICIAN ASSISTANT

## 2018-03-30 PROCEDURE — 80053 COMPREHEN METABOLIC PANEL: CPT | Performed by: PHYSICIAN ASSISTANT

## 2018-03-30 PROCEDURE — 84703 CHORIONIC GONADOTROPIN ASSAY: CPT | Performed by: PHYSICIAN ASSISTANT

## 2018-03-30 PROCEDURE — 36415 COLL VENOUS BLD VENIPUNCTURE: CPT | Performed by: PHYSICIAN ASSISTANT

## 2018-03-30 PROCEDURE — 80061 LIPID PANEL: CPT | Performed by: PHYSICIAN ASSISTANT

## 2018-03-30 RX ORDER — ISOTRETINOIN 40 MG/1
40 CAPSULE ORAL
Qty: 30 CAPSULE | Refills: 0 | Status: SHIPPED | OUTPATIENT
Start: 2018-03-30 | End: 2018-04-26

## 2018-03-30 NOTE — LETTER
3/30/2018         RE: Chloe Obando  [address] [facility] [id number]        Dear Colleague,    Thank you for referring your patient, Chloe Obando, to the [facility]. Please see a copy of my visit note below.    Chloe Obando is a 22 year old year old female patient here today for recheck acne vulgaris. Patient finished her first month of 40 mg daily with food. She reports dryness which has improved these last few weeks with moisturizers. She denies any other side effects, no mood changes. This is her second round of isotretinoin.   Remainder of the HPI, Meds, PMH, Allergies, FH, and SH was reviewed in chart.    Pertinent Hx:   Acne vulgaris   Past Medical History:   Diagnosis Date     Closed fracture of unspecified part of humerus     Left Humerus Fracture     Hematuria as child     Urinary tract infection, site not specified as child    nl renal U/S, no VCUG       Past Surgical History:   Procedure Laterality Date     TONSILLECTOMY ADULT Bilateral 3/15/2017    Procedure: TONSILLECTOMY ADULT;  Surgeon: Kamini Valdovinos MD;  Location: [facility] OR        Family History   Problem Relation Age of Onset     DIABETES Mother      gestational diabetes (history of)     Family History Negative Father      C.A.D. Maternal Grandmother      Quad. Bypass at age 74     DIABETES Maternal Grandmother      Asthma Maternal Grandmother      Lipids Maternal Grandmother      Arthritis Maternal Grandfather      Hypertension Paternal Grandmother      Lipids Paternal Grandmother      cholesterol     Family History Negative Paternal Grandfather        Social History     Social History     Marital status: Single     Spouse name: N/A     Number of children: N/A     Years of education: N/A     Occupational History     Not on file.     Social History Main Topics     Smoking status: Never Smoker     Smokeless tobacco: Never Used      Comment: parents don't smoke in the house     Alcohol use No     Drug use: No     Sexual  activity: Yes     Partners: Male     Birth control/ protection: Condom     Other Topics Concern     Parent/Sibling W/ Cabg, Mi Or Angioplasty Before 65f 55m? No     Social History Narrative       Outpatient Encounter Prescriptions as of 3/30/2018   Medication Sig Dispense Refill     ISOtretinoin (ACCUTANE) 40 MG capsule Take 1 capsule (40 mg) by mouth daily with food 30 capsule 0     sertraline (ZOLOFT) 50 MG tablet Take 1/2 tablet (25 mg) for 1 weeks, then increase to 1 tablet orally daily 30 tablet 1     metFORMIN (GLUCOPHAGE) 850 MG tablet Take 1 tablet (850 mg) by mouth 2 times daily (with meals) 180 tablet 3     norethindrone-ethinyl estradiol (ORTHO-NOVUM 1-35 TAB,NORTREL 1-35 TAB) 1-35 MG-MCG per tablet Take 1 tablet by mouth daily 84 tablet 3     [DISCONTINUED] ISOtretinoin (ACCUTANE) 40 MG capsule Take 1 capsule (40 mg) by mouth daily with food 30 capsule 0     albuterol (PROAIR HFA/PROVENTIL HFA/VENTOLIN HFA) 108 (90 BASE) MCG/ACT Inhaler Inhale 2 puffs into the lungs every 6 hours as needed for shortness of breath / dyspnea or wheezing (Patient not taking: Reported on 3/2/2018) 1 Inhaler 0     No facility-administered encounter medications on file as of 3/30/2018.              Review Of Systems  Skin: As above  Eyes: negative  Ears/Nose/Throat: negative  Respiratory: No shortness of breath, dyspnea on exertion, cough, or hemoptysis  Cardiovascular: negative  Gastrointestinal: negative  Genitourinary: negative  Musculoskeletal: negative  Neurologic: negative  Psychiatric: negative  Hematologic/Lymphatic/Immunologic: negative  Endocrine: negative      O:   NAD, WDWN, Alert & Oriented, Mood & Affect wnl, Vitals stable   Here today alone   /61  Pulse 76  SpO2 96%   General appearance normal   Vitals stable   Alert, oriented and in no acute distress     Few inflammatory papules on back and face   Healing pink macule       Eyes: Conjunctivae/lids:Normal     ENT: Lips: normal    MSK:Normal    Pulm:  Breathing Normal    Neuro/Psych: Orientation:Normal; Mood/Affect:Normal  A/P:  1. Acne Vulgaris   Patient would like to start a another month of 40 mg daily.   Patient is aware of depression side effects and she will make us or PCP aware if mood changes. She notes that it is well controlled and did not have any issues with mood in the past with isotretinoin.   Standing hcg, CBC, CMP and fasting lipids  Ipledge reviewed with patient and Ipledge consent form complete  Patient place in ipledge system  Contraception: 1. Birth control pills 2. Condoms   Ipledge: [phone]  Return to clinic 30 days  Dry lips and mouth, minor swelling of the eyelids or lips, crusty skin, nosebleeds, GI upset, or thinning of hair may occur. If any of these effects persist or worsen, tell your doctor or pharmacist promptly.   To relieve dry mouth, suck on (sugarless) hard candy or ice chips, chew (sugarless) gum, drink water.   Remember that your doctor has prescribed this medication because he or she has judged that the benefit to you is greater than the risk of side effects. Many people using this medication do not have serious side effects.   Contact office immediately if you have any of these unlikely but serious side effects: mental/mood changes (e.g., depression,  aggressive or violent behavior, and in rare cases, thoughts of suicide), tingling feeling in the skin, quick/severe sun sensitivity, back/joint/muscle pain, signs of infection (e.g., fever, persistent sore throat, painful swallowing, peeling skin on palms/soles.   Isotretinoin may infrequently cause disease of the pancreatitis, that may rarely be fatal. Stop taking this medication and contact office immediately if you develop: severe stomach pain severe or persistent GI upset,   Stop taking this medication and tell your doctor immediately if you develop these unlikely but very serious side effects: severe headache, vision changes, ear ringing, hearling loss, chest pain,  yellowing eyes, skin, dark urine, severe diarrhea, rectal bleeding,   Seek immediate medical attention if you notice any symptoms of a serious allergic reaction.      Accutane is discussed fully with the patient. It is a very effective drug to treat acne vulgaris but has many potential significant side effects. Chief among these are teratogensis, hepatic injury, dyslipidemia and severe drying of the mucous membranes. All of these issues have been discussed in details. Monthly blood tests to monitor lipids and liver functions will be necessary. Expect painful dryness and/or fissuring around the lips, eyes, and other moist areas of the body. Balms may be protective. Contact lens may be too painful to wear temporarily while on this drug. Episodes of significant depression have been reported, including suicidal ideation and attempts in rare cases. It may also cause pseudotumor cerebri and hyperostosis. The patient will report any such changes in mood, depressive symptoms or suicidal thoughts, headaches, joint or bone pains. There is also a possible association with inflammatory bowel disease, although this is unproven at this point.     Again, thank you for allowing me to participate in the care of your patient.        Sincerely,        Yasmine Moreno PA-C

## 2018-03-30 NOTE — NURSING NOTE
"Initial /61  Pulse 76  SpO2 96% Estimated body mass index is 40.62 kg/(m^2) as calculated from the following:    Height as of 3/2/18: 1.549 m (5' 1\").    Weight as of 3/2/18: 97.5 kg (215 lb). .    Leonila Garcia LPN    "

## 2018-03-30 NOTE — PROGRESS NOTES
Chloe Obando is a 22 year old year old female patient here today for recheck acne vulgaris. Patient finished her first month of 40 mg daily with food. She reports dryness which has improved these last few weeks with moisturizers. She denies any other side effects, no mood changes. This is her second round of isotretinoin.   Remainder of the HPI, Meds, PMH, Allergies, FH, and SH was reviewed in chart.    Pertinent Hx:   Acne vulgaris   Past Medical History:   Diagnosis Date     Closed fracture of unspecified part of humerus     Left Humerus Fracture     Hematuria as child     Urinary tract infection, site not specified as child    nl renal U/S, no VCUG       Past Surgical History:   Procedure Laterality Date     TONSILLECTOMY ADULT Bilateral 3/15/2017    Procedure: TONSILLECTOMY ADULT;  Surgeon: Kamini Valdovinos MD;  Location: [facility]        Family History   Problem Relation Age of Onset     DIABETES Mother      gestational diabetes (history of)     Family History Negative Father      C.A.D. Maternal Grandmother      Quad. Bypass at age 74     DIABETES Maternal Grandmother      Asthma Maternal Grandmother      Lipids Maternal Grandmother      Arthritis Maternal Grandfather      Hypertension Paternal Grandmother      Lipids Paternal Grandmother      cholesterol     Family History Negative Paternal Grandfather        Social History     Social History     Marital status: Single     Spouse name: N/A     Number of children: N/A     Years of education: N/A     Occupational History     Not on file.     Social History Main Topics     Smoking status: Never Smoker     Smokeless tobacco: Never Used      Comment: parents don't smoke in the house     Alcohol use No     Drug use: No     Sexual activity: Yes     Partners: Male     Birth control/ protection: Condom     Other Topics Concern     Parent/Sibling W/ Cabg, Mi Or Angioplasty Before 65f 55m? No     Social History Narrative       Outpatient Encounter Prescriptions as of  3/30/2018   Medication Sig Dispense Refill     ISOtretinoin (ACCUTANE) 40 MG capsule Take 1 capsule (40 mg) by mouth daily with food 30 capsule 0     sertraline (ZOLOFT) 50 MG tablet Take 1/2 tablet (25 mg) for 1 weeks, then increase to 1 tablet orally daily 30 tablet 1     metFORMIN (GLUCOPHAGE) 850 MG tablet Take 1 tablet (850 mg) by mouth 2 times daily (with meals) 180 tablet 3     norethindrone-ethinyl estradiol (ORTHO-NOVUM 1-35 TAB,NORTREL 1-35 TAB) 1-35 MG-MCG per tablet Take 1 tablet by mouth daily 84 tablet 3     [DISCONTINUED] ISOtretinoin (ACCUTANE) 40 MG capsule Take 1 capsule (40 mg) by mouth daily with food 30 capsule 0     albuterol (PROAIR HFA/PROVENTIL HFA/VENTOLIN HFA) 108 (90 BASE) MCG/ACT Inhaler Inhale 2 puffs into the lungs every 6 hours as needed for shortness of breath / dyspnea or wheezing (Patient not taking: Reported on 3/2/2018) 1 Inhaler 0     No facility-administered encounter medications on file as of 3/30/2018.              Review Of Systems  Skin: As above  Eyes: negative  Ears/Nose/Throat: negative  Respiratory: No shortness of breath, dyspnea on exertion, cough, or hemoptysis  Cardiovascular: negative  Gastrointestinal: negative  Genitourinary: negative  Musculoskeletal: negative  Neurologic: negative  Psychiatric: negative  Hematologic/Lymphatic/Immunologic: negative  Endocrine: negative      O:   NAD, WDWN, Alert & Oriented, Mood & Affect wnl, Vitals stable   Here today alone   /61  Pulse 76  SpO2 96%   General appearance normal   Vitals stable   Alert, oriented and in no acute distress     Few inflammatory papules on back and face   Healing pink macule       Eyes: Conjunctivae/lids:Normal     ENT: Lips: normal    MSK:Normal    Pulm: Breathing Normal    Neuro/Psych: Orientation:Normal; Mood/Affect:Normal  A/P:  1. Acne Vulgaris   Patient would like to start a another month of 40 mg daily.   Patient is aware of depression side effects and she will make us or PCP aware if  mood changes. She notes that it is well controlled and did not have any issues with mood in the past with isotretinoin.   Standing hcg, CBC, CMP and fasting lipids  Ipledge reviewed with patient and Ipledge consent form complete  Patient place in [employer] system  Contraception: 1. Birth control pills 2. Condoms   Ipledge: [id number]  Return to clinic 30 days  Dry lips and mouth, minor swelling of the eyelids or lips, crusty skin, nosebleeds, GI upset, or thinning of hair may occur. If any of these effects persist or worsen, tell your doctor or pharmacist promptly.   To relieve dry mouth, suck on (sugarless) hard candy or ice chips, chew (sugarless) gum, drink water.   Remember that your doctor has prescribed this medication because he or she has judged that the benefit to you is greater than the risk of side effects. Many people using this medication do not have serious side effects.   Contact office immediately if you have any of these unlikely but serious side effects: mental/mood changes (e.g., depression,  aggressive or violent behavior, and in rare cases, thoughts of suicide), tingling feeling in the skin, quick/severe sun sensitivity, back/joint/muscle pain, signs of infection (e.g., fever, persistent sore throat, painful swallowing, peeling skin on palms/soles.   Isotretinoin may infrequently cause disease of the pancreatitis, that may rarely be fatal. Stop taking this medication and contact office immediately if you develop: severe stomach pain severe or persistent GI upset,   Stop taking this medication and tell your doctor immediately if you develop these unlikely but very serious side effects: severe headache, vision changes, ear ringing, hearling loss, chest pain, yellowing eyes, skin, dark urine, severe diarrhea, rectal bleeding,   Seek immediate medical attention if you notice any symptoms of a serious allergic reaction.      Accutane is discussed fully with the patient. It is a very effective drug to  treat acne vulgaris but has many potential significant side effects. Chief among these are teratogensis, hepatic injury, dyslipidemia and severe drying of the mucous membranes. All of these issues have been discussed in details. Monthly blood tests to monitor lipids and liver functions will be necessary. Expect painful dryness and/or fissuring around the lips, eyes, and other moist areas of the body. Balms may be protective. Contact lens may be too painful to wear temporarily while on this drug. Episodes of significant depression have been reported, including suicidal ideation and attempts in rare cases. It may also cause pseudotumor cerebri and hyperostosis. The patient will report any such changes in mood, depressive symptoms or suicidal thoughts, headaches, joint or bone pains. There is also a possible association with inflammatory bowel disease, although this is unproven at this point.

## 2018-03-30 NOTE — MR AVS SNAPSHOT
After Visit Summary   3/30/2018    Chloe Obando    MRN: [id number]           Patient Information     Date Of Birth          1995        Visit Information        Provider Department      3/30/2018 7:40 AM Yasmine Lyman PA-C [facility]        Today's Diagnoses     Acne vulgaris           Follow-ups after your visit        Your next 10 appointments already scheduled     Apr 26, 2018  7:00 AM CDT   [facility] Yasmine [facility]yman [facility]-C   [facility]    [address] [address]   [phone]            May 24, 2018  7:40 AM CDT   [facility] Visit with Yasmine Lyman PA-C   [facility]    [address] [address]   [phone]            Jun 20, 2018  8:20 AM CDT   [facility] Visit with Yasmine Lyman PA-C   [facility]    [address] [address]   [phone]            Jul 20, 2018  7:40 AM CDT   [facility] Visit with Yasmine Lyman PA-C   [facility]    [address] [address]   [phone]              Who to contact     If you have questions or need follow up information about today's clinic visit or your schedule please contact [facility] directly at [phone].  Normal or non-critical lab and imaging results will be communicated to you by [employer], letter or phone within 4 business days after the clinic has received the results. If you do not hear from us within 7 days, please contact the clinic through MyChart or phone. If you have a critical or abnormal lab result, we will notify you by phone as soon as possible.  Submit refill requests through [employer] or call your pharmacy and they will forward the refill request to us. Please allow 3 business days for your refill to be completed.           Additional Information About Your Visit        MyChart Information     [employer] gives you secure access to your electronic health record. If you see a primary care provider, you can also send messages to your care team and make appointments. If you have questions, please call your primary care clinic.  If you do not have a primary care provider, please call [phone] and they will assist you.        Care EveryWhere ID     This is your Care EveryWhere ID. This could be used by other organizations to access your [facility] medical records  [id number]        Your Vitals Were     Pulse Pulse Oximetry                76 96%           Blood Pressure from Last 3 Encounters:   03/30/18 116/61   03/02/18 117/74   01/31/18 116/76    Weight from Last 3 Encounters:   03/02/18 97.5 kg (215 lb)   01/11/18 90.7 kg (200 lb)   12/01/17 94.7 kg (208 lb 12.8 oz)              We Performed the Following     Beta HCG qual IFA urine     CBC with platelets     Comprehensive metabolic panel     Lipid panel reflex to direct LDL Non-fasting          Where to get your medicines      These medications were sent to [facility] [address]  [address] [address]     Phone:  [phone]     ISOtretinoin 40 MG capsule          Primary Care Provider Office Phone # Fax #    Mervat Mathiasangel Schroeder [phone] [address] [place] [address]        Equal Access to Services     [place] [facility] Hospital[facility]W : Hadii abiel Donald, waaxda lucolinadaha, qaybta kaalmada chuck, gus evans . So [facility] [phone].    ATENCIÓN: Si habla español, tiene a gutierrez disposición servicios gratuitos de asistencia lingüística. Llame al [phone].    We comply with applicable federal civil rights laws and [place] laws. We do not discriminate on the basis of race, color, national origin, age, disability, sex, sexual orientation, or gender identity.            Thank you!      Thank you for choosing [facility]  for your care. Our goal is always to provide you with excellent care. Hearing back from our patients is one way we can continue to improve our services. Please take a few minutes to complete the written survey that you may receive in the mail after your visit with us. Thank you!             Your Updated Medication List - Protect others around you: Learn how to safely use, store and throw away your medicines at www.disposemymeds.org.          This list is accurate as of 3/30/18  8:07 AM.  Always use your most recent med list.                   Brand Name Dispense Instructions for use Diagnosis    albuterol 108 (90 BASE) MCG/ACT Inhaler    PROAIR HFA/PROVENTIL HFA/VENTOLIN HFA    1 Inhaler    Inhale 2 puffs into the lungs every 6 hours as needed for shortness of breath / dyspnea or wheezing    Cough       ISOtretinoin 40 MG capsule    ACCUTANE    30 capsule    Take 1 capsule (40 mg) by mouth daily with food    Acne vulgaris       metFORMIN 850 MG tablet    GLUCOPHAGE    180 tablet    Take 1 tablet (850 mg) by mouth 2 times daily (with meals)    Amenorrhea, secondary       norethindrone-ethinyl estradiol 1-35 MG-MCG per tablet    ORTHO-NOVUM 1-35 TAB,NORTREL 1-35 TAB    84 tablet    Take 1 tablet by mouth daily    Absence of menstruation       sertraline 50 MG tablet    ZOLOFT    30 tablet    Take 1/2 tablet (25 mg) for 1 weeks, then increase to 1 tablet orally daily    Generalized anxiety disorder, Moderate episode of recurrent major depressive disorder (H)

## 2018-04-26 ENCOUNTER — OFFICE VISIT (OUTPATIENT)
Dept: DERMATOLOGY | Facility: CLINIC | Age: 23
End: 2018-04-26
Payer: COMMERCIAL

## 2018-04-26 VITALS — SYSTOLIC BLOOD PRESSURE: 106 MMHG | DIASTOLIC BLOOD PRESSURE: 63 MMHG | OXYGEN SATURATION: 96 % | HEART RATE: 69 BPM

## 2018-04-26 DIAGNOSIS — L70.0 ACNE VULGARIS: ICD-10-CM

## 2018-04-26 LAB
ALBUMIN SERPL-MCNC: 3.6 G/DL (ref 3.4–5)
ALP SERPL-CCNC: 91 U/L (ref 40–150)
ALT SERPL W P-5'-P-CCNC: 71 U/L (ref 0–50)
ANION GAP SERPL CALCULATED.3IONS-SCNC: 5 MMOL/L (ref 3–14)
AST SERPL W P-5'-P-CCNC: 43 U/L (ref 0–45)
BETA HCG QUAL IFA URINE: NEGATIVE
BILIRUB SERPL-MCNC: 0.5 MG/DL (ref 0.2–1.3)
BUN SERPL-MCNC: 11 MG/DL (ref 7–30)
CALCIUM SERPL-MCNC: 8.5 MG/DL (ref 8.5–10.1)
CHLORIDE SERPL-SCNC: 106 MMOL/L (ref 94–109)
CHOLEST SERPL-MCNC: 171 MG/DL
CO2 SERPL-SCNC: 25 MMOL/L (ref 20–32)
CREAT SERPL-MCNC: 0.53 MG/DL (ref 0.52–1.04)
ERYTHROCYTE [DISTWIDTH] IN BLOOD BY AUTOMATED COUNT: 13 % (ref 10–15)
GFR SERPL CREATININE-BSD FRML MDRD: >90 ML/MIN/1.7M2
GLUCOSE SERPL-MCNC: 115 MG/DL (ref 70–99)
HCT VFR BLD AUTO: 40.7 % (ref 35–47)
HDLC SERPL-MCNC: 28 MG/DL
HGB BLD-MCNC: 13.5 G/DL (ref 11.7–15.7)
LDLC SERPL CALC-MCNC: 119 MG/DL
MCH RBC QN AUTO: 28.7 PG (ref 26.5–33)
MCHC RBC AUTO-ENTMCNC: 33.2 G/DL (ref 31.5–36.5)
MCV RBC AUTO: 86 FL (ref 78–100)
NONHDLC SERPL-MCNC: 143 MG/DL
PLATELET # BLD AUTO: 354 10E9/L (ref 150–450)
POTASSIUM SERPL-SCNC: 3.9 MMOL/L (ref 3.4–5.3)
PROT SERPL-MCNC: 8 G/DL (ref 6.8–8.8)
RBC # BLD AUTO: 4.71 10E12/L (ref 3.8–5.2)
SODIUM SERPL-SCNC: 136 MMOL/L (ref 133–144)
TRIGL SERPL-MCNC: 121 MG/DL
WBC # BLD AUTO: 9.7 10E9/L (ref 4–11)

## 2018-04-26 PROCEDURE — 84703 CHORIONIC GONADOTROPIN ASSAY: CPT | Performed by: PHYSICIAN ASSISTANT

## 2018-04-26 PROCEDURE — 99213 OFFICE O/P EST LOW 20 MIN: CPT | Performed by: PHYSICIAN ASSISTANT

## 2018-04-26 PROCEDURE — 80061 LIPID PANEL: CPT | Performed by: PHYSICIAN ASSISTANT

## 2018-04-26 PROCEDURE — 85027 COMPLETE CBC AUTOMATED: CPT | Performed by: PHYSICIAN ASSISTANT

## 2018-04-26 PROCEDURE — 80053 COMPREHEN METABOLIC PANEL: CPT | Performed by: PHYSICIAN ASSISTANT

## 2018-04-26 PROCEDURE — 36415 COLL VENOUS BLD VENIPUNCTURE: CPT | Performed by: PHYSICIAN ASSISTANT

## 2018-04-26 RX ORDER — ISOTRETINOIN 40 MG/1
40 CAPSULE ORAL
Qty: 30 CAPSULE | Refills: 0 | Status: SHIPPED | OUTPATIENT
Start: 2018-04-26 | End: 2018-05-08

## 2018-04-26 NOTE — LETTER
4/26/2018         RE: Chloe Obando  [address] [address]        Dear Colleague,    Thank you for referring your patient, Chloe Obando, to the [facility]. Please see a copy of my visit note below.    Chloe Obando is a 22 year old year old female patient here today for recheck acne vulgaris. Patient finished her second month of 40 mg daily with food. She reports dryness which has improved this past month. She denies any other side effects, no mood changes. This is her second round of isotretinoin. Patient has no other skin complaints today.  Remainder of the HPI, Meds, PMH, Allergies, FH, and SH was reviewed in chart.    Pertinent Hx:  Acne Vulgaris   Past Medical History:   Diagnosis Date     Closed fracture of unspecified part of humerus     Left Humerus Fracture     Hematuria as child     Urinary tract infection, site not specified as child    nl renal U/S, no VCUG       Past Surgical History:   Procedure Laterality Date     TONSILLECTOMY ADULT Bilateral 3/15/2017    Procedure: TONSILLECTOMY ADULT;  Surgeon: Kamini Valdovinos MD;  Location: [facility]        Family History   Problem Relation Age of Onset     DIABETES Mother      gestational diabetes (history of)     Family History Negative Father      C.A.D. Maternal Grandmother      Quad. Bypass at age 74     DIABETES Maternal Grandmother      Asthma Maternal Grandmother      Lipids Maternal Grandmother      Arthritis Maternal Grandfather      Hypertension Paternal Grandmother      Lipids Paternal Grandmother      cholesterol     Family History Negative Paternal Grandfather        Social History     Social History     Marital status: Single     Spouse name: N/A     Number of children: N/A     Years of education: N/A     Occupational History     Not on file.     Social History Main Topics     Smoking status: Never Smoker     Smokeless tobacco: Never Used      Comment: parents don't smoke in the house     Alcohol use No     Drug use:  No     Sexual activity: Yes     Partners: Male     Birth control/ protection: Condom     Other Topics Concern     Parent/Sibling W/ Cabg, Mi Or Angioplasty Before 65f 55m? No     Social History Narrative       Outpatient Encounter Prescriptions as of 4/26/2018   Medication Sig Dispense Refill     ISOtretinoin (ACCUTANE) 40 MG capsule Take 1 capsule (40 mg) by mouth daily with food 30 capsule 0     metFORMIN (GLUCOPHAGE) 850 MG tablet Take 1 tablet (850 mg) by mouth 2 times daily (with meals) 180 tablet 3     norethindrone-ethinyl estradiol (ORTHO-NOVUM 1-35 TAB,NORTREL 1-35 TAB) 1-35 MG-MCG per tablet Take 1 tablet by mouth daily 84 tablet 3     sertraline (ZOLOFT) 50 MG tablet Take 1/2 tablet (25 mg) for 1 weeks, then increase to 1 tablet orally daily 30 tablet 1     albuterol (PROAIR HFA/PROVENTIL HFA/VENTOLIN HFA) 108 (90 BASE) MCG/ACT Inhaler Inhale 2 puffs into the lungs every 6 hours as needed for shortness of breath / dyspnea or wheezing (Patient not taking: Reported on 3/2/2018) 1 Inhaler 0     No facility-administered encounter medications on file as of 4/26/2018.              Review Of Systems  Skin: As above  Eyes: negative  Ears/Nose/Throat: negative  Respiratory: No shortness of breath, dyspnea on exertion, cough, or hemoptysis  Cardiovascular: negative  Gastrointestinal: negative  Genitourinary: negative  Musculoskeletal: negative  Neurologic: negative  Psychiatric: negative  Hematologic/Lymphatic/Immunologic: negative  Endocrine: negative      O:   NAD, WDWN, Alert & Oriented, Mood & Affect wnl, Vitals stable   Here today alone   /63  Pulse 69  SpO2 96%   General appearance normal   Vitals stable   Alert, oriented and in no acute distress     Few healing inflammatory papules on face       Eyes: Conjunctivae/lids:Normal     ENT: Lips: normal    MSK:Normal    Pulm: Breathing Normal    Neuro/Psych: Orientation:Normal; Mood/Affect:Normal  A/P:  1. Acne Vulgaris   Continue Isotretinoin 40 mg daily.    Patient is aware of depression side effects and she will make us or PCP aware if mood changes. She notes that it is well controlled and did not have any issues with mood in the past with isotretinoin.   Standing hcg, CBC, CMP and fasting lipids  Ipledge reviewed with patient and Ipledge consent form complete  Patient place in ipledge system  Contraception: 1. Birth control pills 2. Condoms   Current Dosage: 2400 mg   Goal Dosage: 11,727 mg (120 mg/kg)  Ipledge: [id number]  Return to clinic 30 days  Dry lips and mouth, minor swelling of the eyelids or lips, crusty skin, nosebleeds, GI upset, or thinning of hair may occur. If any of these effects persist or worsen, tell your doctor or pharmacist promptly.   To relieve dry mouth, suck on (sugarless) hard candy or ice chips, chew (sugarless) gum, drink water.   Remember that your doctor has prescribed this medication because he or she has judged that the benefit to you is greater than the risk of side effects. Many people using this medication do not have serious side effects.   Contact office immediately if you have any of these unlikely but serious side effects: mental/mood changes (e.g., depression,  aggressive or violent behavior, and in rare cases, thoughts of suicide), tingling feeling in the skin, quick/severe sun sensitivity, back/joint/muscle pain, signs of infection (e.g., fever, persistent sore throat, painful swallowing, peeling skin on palms/soles.   Isotretinoin may infrequently cause disease of the pancreatitis, that may rarely be fatal. Stop taking this medication and contact office immediately if you develop: severe stomach pain severe or persistent GI upset,   Stop taking this medication and tell your doctor immediately if you develop these unlikely but very serious side effects: severe headache, vision changes, ear ringing, hearling loss, chest pain, yellowing eyes, skin, dark urine, severe diarrhea, rectal bleeding,   Seek immediate medical  attention if you notice any symptoms of a serious allergic reaction.      Accutane is discussed fully with the patient. It is a very effective drug to treat acne vulgaris but has many potential significant side effects. Chief among these are teratogensis, hepatic injury, dyslipidemia and severe drying of the mucous membranes. All of these issues have been discussed in details. Monthly blood tests to monitor lipids and liver functions will be necessary. Expect painful dryness and/or fissuring around the lips, eyes, and other moist areas of the body. Balms may be protective. Contact lens may be too painful to wear temporarily while on this drug. Episodes of significant depression have been reported, including suicidal ideation and attempts in rare cases. It may also cause pseudotumor cerebri and hyperostosis. The patient will report any such changes in mood, depressive symptoms or suicidal thoughts, headaches, joint or bone pains. There is also a possible association with inflammatory bowel disease, although this is unproven at this point.     Again, thank you for allowing me to participate in the care of your patient.        Sincerely,        Yasmine Moreno PA-C

## 2018-04-26 NOTE — MR AVS SNAPSHOT
After Visit Summary   4/26/2018    Chloe Obando    MRN: [id number]           Patient Information     Date Of Birth          1995        Visit Information        Provider Department      4/26/2018 7:00 AM Yasmine Lyman PA-C [facility]        Today's Diagnoses     Acne vulgaris           Follow-ups after your visit        Your next 10 appointments already scheduled     May 24, 2018  7:40 AM CDT   Return Visit with Yasmine Lyman PA-C   [facility] ([facility])    [address] [place] [address]   [phone]            Jun 20, 2018  8:20 AM CDT   Return Visit with Yasmine Lyman PA-C   [facility] ([facility])    [address] [place] [address]   [phone]            Jul 20, 2018  7:40 AM CDT   Return Visit with Yasmine Lyman PA-C   [facility] ([facility])    [address] [place] [address]   [phone]              Who to contact     If you have questions or need follow up information about today's clinic visit or your schedule please contact [facility] directly at [phone].  Normal or non-critical lab and imaging results will be communicated to you by [employer], letter or phone within 4 business days after the clinic has received the results. If you do not hear from us within 7 days, please contact the clinic through MyChart or phone. If you have a critical or abnormal lab result, we will notify you by phone as soon as possible.  Submit refill requests through [employer] or call your pharmacy and they will forward the refill request to us. Please allow 3 business days for your refill to be completed.          Additional Information About Your Visit        [employer] Information     [employer] gives you secure access to your electronic health record. If you see a primary care provider, you can also send messages  to your care team and make appointments. If you have questions, please call your primary care clinic.  If you do not have a primary care provider, please call [phone] and they will assist you.        Care EveryWhere ID     This is your Care EveryWhere ID. This could be used by other organizations to access your [facility] medical records  [id number]        Your Vitals Were     Pulse Pulse Oximetry                69 96%           Blood Pressure from Last 3 Encounters:   04/26/18 106/63   03/30/18 116/61   03/02/18 117/74    Weight from Last 3 Encounters:   03/02/18 97.5 kg (215 lb)   01/11/18 90.7 kg (200 lb)   12/01/17 94.7 kg (208 lb 12.8 oz)              Today, you had the following     No orders found for display         Where to get your medicines      These medications were sent to [facility] [address]  [address] [address] [facility] [facility] [address]     Phone:  [phone]     ISOtretinoin 40 MG capsule          Primary Care Provider Office Phone # Fax #    Mervat Mathiasangel Schroeder [phone] [address] [place] [address]        Equal Access to Services     SUSHIL MUHAMMAD AH: Hadii abiel richmondo [place]jer[place], waaxda luqadaha, qaybta kaalmada adeegyada, gus gomez. So [facility] [phone].    ATENCIÓN: Si habla español, tiene a gutierrez disposición servicios gratuitos de asistencia lingüística. Llame al [phone].    We comply with applicable federal civil rights laws and [place] laws. We do not discriminate on the basis of race, color, national origin, age, disability, sex, sexual orientation, or gender identity.            Thank you!     Thank you for choosing [facility]  for your care. Our goal is always to provide you with excellent care. Hearing back from our patients is one way we can continue to improve our services. Please take a few minutes to complete the written survey that you may receive in the mail after  your visit with us. Thank you!             Your Updated Medication List - Protect others around you: Learn how to safely use, store and throw away your medicines at www.disposemymeds.org.          This list is accurate as of 4/26/18 11:53 AM.  Always use your most recent med list.                   Brand Name Dispense Instructions for use Diagnosis    albuterol 108 (90 Base) MCG/ACT Inhaler    PROAIR HFA/PROVENTIL HFA/VENTOLIN HFA    1 Inhaler    Inhale 2 puffs into the lungs every 6 hours as needed for shortness of breath / dyspnea or wheezing    Cough       ISOtretinoin 40 MG capsule    ACCUTANE    30 capsule    Take 1 capsule (40 mg) by mouth daily with food    Acne vulgaris       metFORMIN 850 MG tablet    GLUCOPHAGE    180 tablet    Take 1 tablet (850 mg) by mouth 2 times daily (with meals)    Amenorrhea, secondary       norethindrone-ethinyl estradiol 1-35 MG-MCG per tablet    ORTHO-NOVUM 1-35 TAB,NORTREL 1-35 TAB    84 tablet    Take 1 tablet by mouth daily    Absence of menstruation       sertraline 50 MG tablet    ZOLOFT    30 tablet    Take 1/2 tablet (25 mg) for 1 weeks, then increase to 1 tablet orally daily    Generalized anxiety disorder, Moderate episode of recurrent major depressive disorder (H)

## 2018-04-26 NOTE — NURSING NOTE
"Initial /63  Pulse 69  SpO2 96% Estimated body mass index is 40.62 kg/(m^2) as calculated from the following:    Height as of 3/2/18: 1.549 m (5' 1\").    Weight as of 3/2/18: 97.5 kg (215 lb). .    Leonila Garcia LPN    "

## 2018-04-26 NOTE — PROGRESS NOTES
Chloe Obando is a 22 year old year old female patient here today for recheck acne vulgaris. Patient finished her second month of 40 mg daily with food. She reports dryness which has improved this past month. She denies any other side effects, no mood changes. This is her second round of isotretinoin. Patient has no other skin complaints today.  Remainder of the HPI, Meds, PMH, Allergies, FH, and SH was reviewed in chart.    Pertinent Hx:  Acne Vulgaris   Past Medical History:   Diagnosis Date     Closed fracture of unspecified part of humerus     Left Humerus Fracture     Hematuria as child     Urinary tract infection, site not specified as child    nl renal U/S, no VCUG       Past Surgical History:   Procedure Laterality Date     TONSILLECTOMY ADULT Bilateral 3/15/2017    Procedure: TONSILLECTOMY ADULT;  Surgeon: Kamini Valdovinos MD;  Location: [facility] OR        Family History   Problem Relation Age of Onset     DIABETES Mother      gestational diabetes (history of)     Family History Negative Father      C.A.D. Maternal Grandmother      Quad. Bypass at age 74     DIABETES Maternal Grandmother      Asthma Maternal Grandmother      Lipids Maternal Grandmother      Arthritis Maternal Grandfather      Hypertension Paternal Grandmother      Lipids Paternal Grandmother      cholesterol     Family History Negative Paternal Grandfather        Social History     Social History     Marital status: Single     Spouse name: N/A     Number of children: N/A     Years of education: N/A     Occupational History     Not on file.     Social History Main Topics     Smoking status: Never Smoker     Smokeless tobacco: Never Used      Comment: parents don't smoke in the house     Alcohol use No     Drug use: No     Sexual activity: Yes     Partners: Male     Birth control/ protection: Condom     Other Topics Concern     Parent/Sibling W/ Cabg, Mi Or Angioplasty Before 65f 55m? No     Social History Narrative       Outpatient Encounter  Prescriptions as of 4/26/2018   Medication Sig Dispense Refill     ISOtretinoin (ACCUTANE) 40 MG capsule Take 1 capsule (40 mg) by mouth daily with food 30 capsule 0     metFORMIN (GLUCOPHAGE) 850 MG tablet Take 1 tablet (850 mg) by mouth 2 times daily (with meals) 180 tablet 3     norethindrone-ethinyl estradiol (ORTHO-NOVUM 1-35 TAB,NORTREL 1-35 TAB) 1-35 MG-MCG per tablet Take 1 tablet by mouth daily 84 tablet 3     sertraline (ZOLOFT) 50 MG tablet Take 1/2 tablet (25 mg) for 1 weeks, then increase to 1 tablet orally daily 30 tablet 1     albuterol (PROAIR HFA/PROVENTIL HFA/VENTOLIN HFA) 108 (90 BASE) MCG/ACT Inhaler Inhale 2 puffs into the lungs every 6 hours as needed for shortness of breath / dyspnea or wheezing (Patient not taking: Reported on 3/2/2018) 1 Inhaler 0     No facility-administered encounter medications on file as of 4/26/2018.              Review Of Systems  Skin: As above  Eyes: negative  Ears/Nose/Throat: negative  Respiratory: No shortness of breath, dyspnea on exertion, cough, or hemoptysis  Cardiovascular: negative  Gastrointestinal: negative  Genitourinary: negative  Musculoskeletal: negative  Neurologic: negative  Psychiatric: negative  Hematologic/Lymphatic/Immunologic: negative  Endocrine: negative      O:   NAD, WDWN, Alert & Oriented, Mood & Affect wnl, Vitals stable   Here today alone   /63  Pulse 69  SpO2 96%   General appearance normal   Vitals stable   Alert, oriented and in no acute distress     Few healing inflammatory papules on face       Eyes: Conjunctivae/lids:Normal     ENT: Lips: normal    MSK:Normal    Pulm: Breathing Normal    Neuro/Psych: Orientation:Normal; Mood/Affect:Normal  A/P:  1. Acne Vulgaris   Continue Isotretinoin 40 mg daily.   Patient is aware of depression side effects and she will make us or PCP aware if mood changes. She notes that it is well controlled and did not have any issues with mood in the past with isotretinoin.   Standing hcg, CBC, CMP  and fasting lipids  Ipledge reviewed with patient and Ipledge consent form complete  Patient place in ipledge system  Contraception: 1. Birth control pills 2. Condoms   Current Dosage: 2400 mg   Goal Dosage: 11,727 mg (120 mg/kg)  Ipledge: [phone]  Return to clinic 30 days  Dry lips and mouth, minor swelling of the eyelids or lips, crusty skin, nosebleeds, GI upset, or thinning of hair may occur. If any of these effects persist or worsen, tell your doctor or pharmacist promptly.   To relieve dry mouth, suck on (sugarless) hard candy or ice chips, chew (sugarless) gum, drink water.   Remember that your doctor has prescribed this medication because he or she has judged that the benefit to you is greater than the risk of side effects. Many people using this medication do not have serious side effects.   Contact office immediately if you have any of these unlikely but serious side effects: mental/mood changes (e.g., depression,  aggressive or violent behavior, and in rare cases, thoughts of suicide), tingling feeling in the skin, quick/severe sun sensitivity, back/joint/muscle pain, signs of infection (e.g., fever, persistent sore throat, painful swallowing, peeling skin on palms/soles.   Isotretinoin may infrequently cause disease of the pancreatitis, that may rarely be fatal. Stop taking this medication and contact office immediately if you develop: severe stomach pain severe or persistent GI upset,   Stop taking this medication and tell your doctor immediately if you develop these unlikely but very serious side effects: severe headache, vision changes, ear ringing, hearling loss, chest pain, yellowing eyes, skin, dark urine, severe diarrhea, rectal bleeding,   Seek immediate medical attention if you notice any symptoms of a serious allergic reaction.      Accutane is discussed fully with the patient. It is a very effective drug to treat acne vulgaris but has many potential significant side effects. Chief among  these are teratogensis, hepatic injury, dyslipidemia and severe drying of the mucous membranes. All of these issues have been discussed in details. Monthly blood tests to monitor lipids and liver functions will be necessary. Expect painful dryness and/or fissuring around the lips, eyes, and other moist areas of the body. Balms may be protective. Contact lens may be too painful to wear temporarily while on this drug. Episodes of significant depression have been reported, including suicidal ideation and attempts in rare cases. It may also cause pseudotumor cerebri and hyperostosis. The patient will report any such changes in mood, depressive symptoms or suicidal thoughts, headaches, joint or bone pains. There is also a possible association with inflammatory bowel disease, although this is unproven at this point.

## 2018-05-04 ENCOUNTER — TELEPHONE (OUTPATIENT)
Dept: DERMATOLOGY | Facility: CLINIC | Age: 23
End: 2018-05-04

## 2018-05-04 DIAGNOSIS — Z79.899 ENCOUNTER FOR LONG-TERM (CURRENT) USE OF HIGH-RISK MEDICATION: Primary | ICD-10-CM

## 2018-05-04 NOTE — TELEPHONE ENCOUNTER
Discussed with provider. UPT ordered, patient called and informed.   Patient hoping to complet UPT tonight, otherwise will do tomorrow- called lab and confirmed time open tomorrow-patient updated with their hours.   Patient asking for call when medication ordered so she does not miss picking up again.     Gela CANTU RN   Specialty Clinics

## 2018-05-04 NOTE — TELEPHONE ENCOUNTER
Reason for Call:  Other prescription    Detailed comments: pt calling stating she could not  her accutane until the 1st but they never called her until the 3rd which then was too late and pated the time frame. She was told by pharmacy to call the providers office to see what she needs to do to get her script     Phone Number Patient can be reached at: Home number on file [phone] (home)    Best Time: any     Can we leave a detailed message on this number? YES    Call taken on 5/4/2018 at 1:47 PM by Samantha Mccann

## 2018-05-07 DIAGNOSIS — Z79.899 ENCOUNTER FOR LONG-TERM (CURRENT) USE OF HIGH-RISK MEDICATION: ICD-10-CM

## 2018-05-07 DIAGNOSIS — L70.0 ACNE VULGARIS: ICD-10-CM

## 2018-05-07 LAB — BETA HCG QUAL IFA URINE: NEGATIVE

## 2018-05-07 PROCEDURE — 84703 CHORIONIC GONADOTROPIN ASSAY: CPT | Performed by: PHYSICIAN ASSISTANT

## 2018-05-08 ENCOUNTER — TELEPHONE (OUTPATIENT)
Dept: FAMILY MEDICINE | Facility: CLINIC | Age: 23
End: 2018-05-08

## 2018-05-08 ENCOUNTER — MYC MEDICAL ADVICE (OUTPATIENT)
Dept: FAMILY MEDICINE | Facility: CLINIC | Age: 23
End: 2018-05-08

## 2018-05-08 RX ORDER — ISOTRETINOIN 40 MG/1
40 CAPSULE ORAL
Qty: 30 CAPSULE | Refills: 0 | Status: SHIPPED | OUTPATIENT
Start: 2018-05-08 | End: 2018-05-24

## 2018-05-08 NOTE — TELEPHONE ENCOUNTER
UPT is negative. Unable to reach patient. Message left that test result is negative, rx was resent to her pharmacy, answer your questions again and then  rx before May 13 th. Call if questions.  Celestina Alfonso RN

## 2018-05-21 ASSESSMENT — ANXIETY QUESTIONNAIRES
7. FEELING AFRAID AS IF SOMETHING AWFUL MIGHT HAPPEN: NEARLY EVERY DAY
7. FEELING AFRAID AS IF SOMETHING AWFUL MIGHT HAPPEN: NEARLY EVERY DAY
GAD7 TOTAL SCORE: 16
4. TROUBLE RELAXING: SEVERAL DAYS
6. BECOMING EASILY ANNOYED OR IRRITABLE: NEARLY EVERY DAY
1. FEELING NERVOUS, ANXIOUS, OR ON EDGE: NEARLY EVERY DAY
GAD7 TOTAL SCORE: 16
5. BEING SO RESTLESS THAT IT IS HARD TO SIT STILL: SEVERAL DAYS
2. NOT BEING ABLE TO STOP OR CONTROL WORRYING: MORE THAN HALF THE DAYS
GAD7 TOTAL SCORE: 16
3. WORRYING TOO MUCH ABOUT DIFFERENT THINGS: NEARLY EVERY DAY

## 2018-05-21 ASSESSMENT — PATIENT HEALTH QUESTIONNAIRE - PHQ9
SUM OF ALL RESPONSES TO PHQ QUESTIONS 1-9: 9
SUM OF ALL RESPONSES TO PHQ QUESTIONS 1-9: 9
10. IF YOU CHECKED OFF ANY PROBLEMS, HOW DIFFICULT HAVE THESE PROBLEMS MADE IT FOR YOU TO DO YOUR WORK, TAKE CARE OF THINGS AT HOME, OR GET ALONG WITH OTHER PEOPLE: VERY DIFFICULT

## 2018-05-22 ASSESSMENT — ANXIETY QUESTIONNAIRES: GAD7 TOTAL SCORE: 16

## 2018-05-22 ASSESSMENT — PATIENT HEALTH QUESTIONNAIRE - PHQ9: SUM OF ALL RESPONSES TO PHQ QUESTIONS 1-9: 9

## 2018-05-24 ENCOUNTER — OFFICE VISIT (OUTPATIENT)
Dept: DERMATOLOGY | Facility: CLINIC | Age: 23
End: 2018-05-24
Payer: COMMERCIAL

## 2018-05-24 VITALS — SYSTOLIC BLOOD PRESSURE: 111 MMHG | HEART RATE: 81 BPM | DIASTOLIC BLOOD PRESSURE: 68 MMHG | OXYGEN SATURATION: 96 %

## 2018-05-24 DIAGNOSIS — L70.0 ACNE VULGARIS: ICD-10-CM

## 2018-05-24 LAB
ALBUMIN SERPL-MCNC: 3.8 G/DL (ref 3.4–5)
ALP SERPL-CCNC: 76 U/L (ref 40–150)
ALT SERPL W P-5'-P-CCNC: 68 U/L (ref 0–50)
ANION GAP SERPL CALCULATED.3IONS-SCNC: 7 MMOL/L (ref 3–14)
AST SERPL W P-5'-P-CCNC: 49 U/L (ref 0–45)
BETA HCG QUAL IFA URINE: NEGATIVE
BILIRUB SERPL-MCNC: 0.9 MG/DL (ref 0.2–1.3)
BUN SERPL-MCNC: 10 MG/DL (ref 7–30)
CALCIUM SERPL-MCNC: 9.2 MG/DL (ref 8.5–10.1)
CHLORIDE SERPL-SCNC: 103 MMOL/L (ref 94–109)
CHOLEST SERPL-MCNC: 198 MG/DL
CO2 SERPL-SCNC: 25 MMOL/L (ref 20–32)
CREAT SERPL-MCNC: 0.56 MG/DL (ref 0.52–1.04)
ERYTHROCYTE [DISTWIDTH] IN BLOOD BY AUTOMATED COUNT: 13.2 % (ref 10–15)
GFR SERPL CREATININE-BSD FRML MDRD: >90 ML/MIN/1.7M2
GLUCOSE SERPL-MCNC: 134 MG/DL (ref 70–99)
HCT VFR BLD AUTO: 40.6 % (ref 35–47)
HDLC SERPL-MCNC: 35 MG/DL
HGB BLD-MCNC: 13.7 G/DL (ref 11.7–15.7)
LDLC SERPL CALC-MCNC: 143 MG/DL
MCH RBC QN AUTO: 29.1 PG (ref 26.5–33)
MCHC RBC AUTO-ENTMCNC: 33.7 G/DL (ref 31.5–36.5)
MCV RBC AUTO: 86 FL (ref 78–100)
NONHDLC SERPL-MCNC: 163 MG/DL
PLATELET # BLD AUTO: 346 10E9/L (ref 150–450)
POTASSIUM SERPL-SCNC: 3.9 MMOL/L (ref 3.4–5.3)
PROT SERPL-MCNC: 8.3 G/DL (ref 6.8–8.8)
RBC # BLD AUTO: 4.7 10E12/L (ref 3.8–5.2)
SODIUM SERPL-SCNC: 135 MMOL/L (ref 133–144)
TRIGL SERPL-MCNC: 102 MG/DL
WBC # BLD AUTO: 8.1 10E9/L (ref 4–11)

## 2018-05-24 PROCEDURE — 85027 COMPLETE CBC AUTOMATED: CPT | Performed by: PHYSICIAN ASSISTANT

## 2018-05-24 PROCEDURE — 36415 COLL VENOUS BLD VENIPUNCTURE: CPT | Performed by: PHYSICIAN ASSISTANT

## 2018-05-24 PROCEDURE — 84703 CHORIONIC GONADOTROPIN ASSAY: CPT | Performed by: PHYSICIAN ASSISTANT

## 2018-05-24 PROCEDURE — 80053 COMPREHEN METABOLIC PANEL: CPT | Performed by: PHYSICIAN ASSISTANT

## 2018-05-24 PROCEDURE — 99213 OFFICE O/P EST LOW 20 MIN: CPT | Performed by: PHYSICIAN ASSISTANT

## 2018-05-24 PROCEDURE — 80061 LIPID PANEL: CPT | Performed by: PHYSICIAN ASSISTANT

## 2018-05-24 RX ORDER — ISOTRETINOIN 40 MG/1
40 CAPSULE ORAL
Qty: 30 CAPSULE | Refills: 0 | Status: SHIPPED | OUTPATIENT
Start: 2018-05-24 | End: 2018-06-26

## 2018-05-24 NOTE — MR AVS SNAPSHOT
After Visit Summary   5/24/2018    Chloe Obando    MRN: [id number]           Patient Information     Date Of Birth          1995        Visit Information        Provider Department      5/24/2018 7:40 AM Yasmine Lyman PA-C [facility]        Today's Diagnoses     Acne vulgaris           Follow-ups after your visit        Your next 10 appointments already scheduled     Jun 01, 2018  3:40 PM CDT   MyChart Long with Mervat Schroeder DO   [facility] ([facility])    [address] [place] [address]   [phone]            Jun 20, 2018  8:20 AM CDT   Return Visit with Yasmine Lyman PA-C   [facility] ([facility]    [address] [id number] [phone]            Jul 20, 2018  7:40 AM CDT   Return Visit with Yasmine Lyman PA-C   [facility] ([facility])    [address] [place] [id number] [phone]              Who to contact     If you have questions or need follow up information about today's clinic visit or your schedule please contact [facility] directly at [phone].  Normal or non-critical lab and imaging results will be communicated to you by "[employer]hart, letter or phone within 4 business days after the clinic has received the results. If you do not hear from us within 7 days, please contact the clinic through MyChart or phone. If you have a critical or abnormal lab result, we will notify you by phone as soon as possible.  Submit refill requests through [employer] or call your pharmacy and they will forward the refill request to us. Please allow 3 business days for your refill to be completed.          Additional Information About Your Visit        [employer] Information     [employer] gives you secure access to your electronic health record. If you see a primary care provider, you can also send messages to  your care team and make appointments. If you have questions, please call your primary care clinic.  If you do not have a primary care provider, please call [phone] and they will assist you.        Care EveryWhere ID     This is your Care EveryWhere ID. This could be used by other organizations to access your [facility] medical records  [id number]        Your Vitals Were     Pulse Pulse Oximetry                81 96%           Blood Pressure from Last 3 Encounters:   05/24/18 111/68   04/26/18 106/63   03/30/18 116/61    Weight from Last 3 Encounters:   03/02/18 97.5 kg (215 lb)   01/11/18 90.7 kg (200 lb)   12/01/17 94.7 kg (208 lb 12.8 oz)              We Performed the Following     Beta HCG qual IFA urine     CBC with platelets     Comprehensive metabolic panel     Lipid panel reflex to direct LDL Non-fasting          Where to get your medicines      These medications were sent to [facility] [address]  [address] [facility] [address]     Phone:  [phone]     ISOtretinoin 40 MG capsule          Primary Care Provider Office Phone # Fax #    Mervat Easton DO Alexandre [phone] [address] [place] [address]        Equal Access to Services     SUSHIL MUHAMMAD : Hadii abiel ku hadasho Sodavidali, waaxda luqadaha, qaybta kaalmada adeegyada, gus gomez. So [facility] [phone].    ATENCIÓN: Si habla español, tiene a gutierrez disposición servicios gratuitos de asistencia lingüística. Llame al [phone].    We comply with applicable federal civil rights laws and [place] laws. We do not discriminate on the basis of race, color, national origin, age, disability, sex, sexual orientation, or gender identity.            Thank you!     Thank you for choosing [facility]  for your care. Our goal is always to provide you with excellent care. Hearing back from our patients is one way we can continue to improve our services.  Please take a few minutes to complete the written survey that you may receive in the mail after your visit with us. Thank you!             Your Updated Medication List - Protect others around you: Learn how to safely use, store and throw away your medicines at www.disposemymeds.org.          This list is accurate as of 5/24/18  8:08 AM.  Always use your most recent med list.                   Brand Name Dispense Instructions for use Diagnosis    albuterol 108 (90 Base) MCG/ACT Inhaler    PROAIR HFA/PROVENTIL HFA/VENTOLIN HFA    1 Inhaler    Inhale 2 puffs into the lungs every 6 hours as needed for shortness of breath / dyspnea or wheezing    Cough       ISOtretinoin 40 MG capsule    ACCUTANE    30 capsule    Take 1 capsule (40 mg) by mouth daily with food    Acne vulgaris       metFORMIN 850 MG tablet    GLUCOPHAGE    180 tablet    Take 1 tablet (850 mg) by mouth 2 times daily (with meals)    Amenorrhea, secondary       norethindrone-ethinyl estradiol 1-35 MG-MCG per tablet    ORTHO-NOVUM 1-35 TAB,NORTREL 1-35 TAB    84 tablet    Take 1 tablet by mouth daily    Absence of menstruation       sertraline 50 MG tablet    ZOLOFT    30 tablet    Take 1/2 tablet (25 mg) for 1 weeks, then increase to 1 tablet orally daily    Generalized anxiety disorder, Moderate episode of recurrent major depressive disorder (H)

## 2018-05-24 NOTE — LETTER
5/24/2018         RE: Chloe Obando  [address] [facility] [address]        Dear Colleague,    Thank you for referring your patient, Chloe Obando, to the [facility]. Please see a copy of my visit note below.    Chloe Obando is a 22 year old year old female patient here today for recheck acne vulgaris. Patient finished her third month of 40 mg daily with food. LFTs have been slightly elevated, this was even prior to starting isotretinoin. She reports dryness which has improved this past month. She denies any other side effects, no mood changes. This is her second round of isotretinoin.   Patient has no other skin complaints today.  Remainder of the HPI, Meds, PMH, Allergies, FH, and SH was reviewed in chart.    Pertinent Hx:   Acne Vulgaris   Past Medical History:   Diagnosis Date     Closed fracture of unspecified part of humerus     Left Humerus Fracture     Hematuria as child     Urinary tract infection, site not specified as child    nl renal U/S, no VCUG       Past Surgical History:   Procedure Laterality Date     TONSILLECTOMY ADULT Bilateral 3/15/2017    Procedure: TONSILLECTOMY ADULT;  Surgeon: Kamini Valdovinos MD;  Location: [facility] OR        Family History   Problem Relation Age of Onset     DIABETES Mother      gestational diabetes (history of)     Family History Negative Father      C.A.D. Maternal Grandmother      Quad. Bypass at age 74     DIABETES Maternal Grandmother      Asthma Maternal Grandmother      Lipids Maternal Grandmother      Arthritis Maternal Grandfather      Hypertension Paternal Grandmother      Lipids Paternal Grandmother      cholesterol     Family History Negative Paternal Grandfather        Social History     Social History     Marital status: Single     Spouse name: N/A     Number of children: N/A     Years of education: N/A     Occupational History     Not on file.     Social History Main Topics     Smoking status: Never Smoker     Smokeless tobacco: Never  Used      Comment: parents don't smoke in the house     Alcohol use No     Drug use: No     Sexual activity: Yes     Partners: Male     Birth control/ protection: Condom     Other Topics Concern     Parent/Sibling W/ Cabg, Mi Or Angioplasty Before 65f 55m? No     Social History Narrative       Outpatient Encounter Prescriptions as of 5/24/2018   Medication Sig Dispense Refill     albuterol (PROAIR HFA/PROVENTIL HFA/VENTOLIN HFA) 108 (90 BASE) MCG/ACT Inhaler Inhale 2 puffs into the lungs every 6 hours as needed for shortness of breath / dyspnea or wheezing 1 Inhaler 0     ISOtretinoin (ACCUTANE) 40 MG capsule Take 1 capsule (40 mg) by mouth daily with food 30 capsule 0     metFORMIN (GLUCOPHAGE) 850 MG tablet Take 1 tablet (850 mg) by mouth 2 times daily (with meals) 180 tablet 3     norethindrone-ethinyl estradiol (ORTHO-NOVUM 1-35 TAB,NORTREL 1-35 TAB) 1-35 MG-MCG per tablet Take 1 tablet by mouth daily 84 tablet 3     sertraline (ZOLOFT) 50 MG tablet Take 1/2 tablet (25 mg) for 1 weeks, then increase to 1 tablet orally daily 30 tablet 1     [DISCONTINUED] ISOtretinoin (ACCUTANE) 40 MG capsule Take 1 capsule (40 mg) by mouth daily with food 30 capsule 0     No facility-administered encounter medications on file as of 5/24/2018.              Review Of Systems  Skin: As above  Eyes: negative  Ears/Nose/Throat: negative  Respiratory: No shortness of breath, dyspnea on exertion, cough, or hemoptysis  Cardiovascular: negative  Gastrointestinal: negative  Genitourinary: negative  Musculoskeletal: negative  Neurologic: negative  Psychiatric: negative  Hematologic/Lymphatic/Immunologic: negative  Endocrine: negative      O:   NAD, WDWN, Alert & Oriented, Mood & Affect wnl, Vitals stable   Here today alone   /68  Pulse 81  SpO2 96%   General appearance normal   Vitals stable   Alert, oriented and in no acute distress     No visible inflammatory papules seen       Eyes: Conjunctivae/lids:Normal     ENT: Lips:  normal    MSK:Normal    Cardiovascular: peripheral edema none    Pulm: Breathing Normal    Neuro/Psych: Orientation:Normal; Mood/Affect:Normal  A/P:  1. Acne Vulgaris   Continue Isotretinoin 40 mg daily.   Patient is aware of depression side effects and she will make us or PCP aware if mood changes. She notes that it is well controlled and did not have any issues with mood in the past with isotretinoin.   Standing hcg, CBC, CMP and fasting lipids  Ipledge reviewed with patient and Ipledge consent form complete  Patient place in ipledge system  Contraception: 1. Birth control pills 2. Condoms   Current Dosage: 3600 mg   Goal Dosage: 11,727 mg (120 mg/kg)  Ipledge: [id number]  Return to clinic 30 days  Dry lips and mouth, minor swelling of the eyelids or lips, crusty skin, nosebleeds, GI upset, or thinning of hair may occur. If any of these effects persist or worsen, tell your doctor or pharmacist promptly.   To relieve dry mouth, suck on (sugarless) hard candy or ice chips, chew (sugarless) gum, drink water.   Remember that your doctor has prescribed this medication because he or she has judged that the benefit to you is greater than the risk of side effects. Many people using this medication do not have serious side effects.   Contact office immediately if you have any of these unlikely but serious side effects: mental/mood changes (e.g., depression,  aggressive or violent behavior, and in rare cases, thoughts of suicide), tingling feeling in the skin, quick/severe sun sensitivity, back/joint/muscle pain, signs of infection (e.g., fever, persistent sore throat, painful swallowing, peeling skin on palms/soles.   Isotretinoin may infrequently cause disease of the pancreatitis, that may rarely be fatal. Stop taking this medication and contact office immediately if you develop: severe stomach pain severe or persistent GI upset,   Stop taking this medication and tell your doctor immediately if you develop these unlikely  but very serious side effects: severe headache, vision changes, ear ringing, hearling loss, chest pain, yellowing eyes, skin, dark urine, severe diarrhea, rectal bleeding,   Seek immediate medical attention if you notice any symptoms of a serious allergic reaction.      Accutane is discussed fully with the patient. It is a very effective drug to treat acne vulgaris but has many potential significant side effects. Chief among these are teratogensis, hepatic injury, dyslipidemia and severe drying of the mucous membranes. All of these issues have been discussed in details. Monthly blood tests to monitor lipids and liver functions will be necessary. Expect painful dryness and/or fissuring around the lips, eyes, and other moist areas of the body. Balms may be protective. Contact lens may be too painful to wear temporarily while on this drug. Episodes of significant depression have been reported, including suicidal ideation and attempts in rare cases. It may also cause pseudotumor cerebri and hyperostosis. The patient will report any such changes in mood, depressive symptoms or suicidal thoughts, headaches, joint or bone pains. There is also a possible association with inflammatory bowel disease, although this is unproven at this point.     Again, thank you for allowing me to participate in the care of your patient.        Sincerely,        Yasmine Moreno PA-C

## 2018-05-24 NOTE — PROGRESS NOTES
Chloe Obando is a 22 year old year old female patient here today for recheck acne vulgaris. Patient finished her third month of 40 mg daily with food. LFTs have been slightly elevated, this was even prior to starting isotretinoin. She reports dryness which has improved this past month. She denies any other side effects, no mood changes. This is her second round of isotretinoin.   Patient has no other skin complaints today.  Remainder of the HPI, Meds, PMH, Allergies, FH, and SH was reviewed in chart.    Pertinent Hx:   Acne Vulgaris   Past Medical History:   Diagnosis Date     Closed fracture of unspecified part of humerus     Left Humerus Fracture     Hematuria as child     Urinary tract infection, site not specified as child    nl renal U/S, no VCUG       Past Surgical History:   Procedure Laterality Date     TONSILLECTOMY ADULT Bilateral 3/15/2017    Procedure: TONSILLECTOMY ADULT;  Surgeon: Kamini Valdovinos MD;  Location: [facility] OR        Family History   Problem Relation Age of Onset     DIABETES Mother      gestational diabetes (history of)     Family History Negative Father      C.A.D. Maternal Grandmother      Quad. Bypass at age 74     DIABETES Maternal Grandmother      Asthma Maternal Grandmother      Lipids Maternal Grandmother      Arthritis Maternal Grandfather      Hypertension Paternal Grandmother      Lipids Paternal Grandmother      cholesterol     Family History Negative Paternal Grandfather        Social History     Social History     Marital status: Single     Spouse name: N/A     Number of children: N/A     Years of education: N/A     Occupational History     Not on file.     Social History Main Topics     Smoking status: Never Smoker     Smokeless tobacco: Never Used      Comment: parents don't smoke in the house     Alcohol use No     Drug use: No     Sexual activity: Yes     Partners: Male     Birth control/ protection: Condom     Other Topics Concern     Parent/Sibling W/ Cabg, Mi Or Angioplasty  Before 65f 55m? No     Social History Narrative       Outpatient Encounter Prescriptions as of 5/24/2018   Medication Sig Dispense Refill     albuterol (PROAIR HFA/PROVENTIL HFA/VENTOLIN HFA) 108 (90 BASE) MCG/ACT Inhaler Inhale 2 puffs into the lungs every 6 hours as needed for shortness of breath / dyspnea or wheezing 1 Inhaler 0     ISOtretinoin (ACCUTANE) 40 MG capsule Take 1 capsule (40 mg) by mouth daily with food 30 capsule 0     metFORMIN (GLUCOPHAGE) 850 MG tablet Take 1 tablet (850 mg) by mouth 2 times daily (with meals) 180 tablet 3     norethindrone-ethinyl estradiol (ORTHO-NOVUM 1-35 TAB,NORTREL 1-35 TAB) 1-35 MG-MCG per tablet Take 1 tablet by mouth daily 84 tablet 3     sertraline (ZOLOFT) 50 MG tablet Take 1/2 tablet (25 mg) for 1 weeks, then increase to 1 tablet orally daily 30 tablet 1     [DISCONTINUED] ISOtretinoin (ACCUTANE) 40 MG capsule Take 1 capsule (40 mg) by mouth daily with food 30 capsule 0     No facility-administered encounter medications on file as of 5/24/2018.              Review Of Systems  Skin: As above  Eyes: negative  Ears/Nose/Throat: negative  Respiratory: No shortness of breath, dyspnea on exertion, cough, or hemoptysis  Cardiovascular: negative  Gastrointestinal: negative  Genitourinary: negative  Musculoskeletal: negative  Neurologic: negative  Psychiatric: negative  Hematologic/Lymphatic/Immunologic: negative  Endocrine: negative      O:   NAD, WDWN, Alert & Oriented, Mood & Affect wnl, Vitals stable   Here today alone   /68  Pulse 81  SpO2 96%   General appearance normal   Vitals stable   Alert, oriented and in no acute distress     No visible inflammatory papules seen       Eyes: Conjunctivae/lids:Normal     ENT: Lips: normal    MSK:Normal    Cardiovascular: peripheral edema none    Pulm: Breathing Normal    Neuro/Psych: Orientation:Normal; Mood/Affect:Normal  A/P:  1. Acne Vulgaris   Continue Isotretinoin 40 mg daily.   Patient is aware of depression side  effects and she will make us or PCP aware if mood changes. She notes that it is well controlled and did not have any issues with mood in the past with isotretinoin.   Standing hcg, CBC, CMP and fasting lipids  Ipledge reviewed with patient and Ipledge consent form complete  Patient place in ipledge system  Contraception: 1. Birth control pills 2. Condoms   Current Dosage: 3600 mg   Goal Dosage: 11,727 mg (120 mg/kg)  Ipledge: [id number]  Return to clinic 30 days  Dry lips and mouth, minor swelling of the eyelids or lips, crusty skin, nosebleeds, GI upset, or thinning of hair may occur. If any of these effects persist or worsen, tell your doctor or pharmacist promptly.   To relieve dry mouth, suck on (sugarless) hard candy or ice chips, chew (sugarless) gum, drink water.   Remember that your doctor has prescribed this medication because he or she has judged that the benefit to you is greater than the risk of side effects. Many people using this medication do not have serious side effects.   Contact office immediately if you have any of these unlikely but serious side effects: mental/mood changes (e.g., depression,  aggressive or violent behavior, and in rare cases, thoughts of suicide), tingling feeling in the skin, quick/severe sun sensitivity, back/joint/muscle pain, signs of infection (e.g., fever, persistent sore throat, painful swallowing, peeling skin on palms/soles.   Isotretinoin may infrequently cause disease of the pancreatitis, that may rarely be fatal. Stop taking this medication and contact office immediately if you develop: severe stomach pain severe or persistent GI upset,   Stop taking this medication and tell your doctor immediately if you develop these unlikely but very serious side effects: severe headache, vision changes, ear ringing, hearling loss, chest pain, yellowing eyes, skin, dark urine, severe diarrhea, rectal bleeding,   Seek immediate medical attention if you notice any symptoms of a  serious allergic reaction.      Accutane is discussed fully with the patient. It is a very effective drug to treat acne vulgaris but has many potential significant side effects. Chief among these are teratogensis, hepatic injury, dyslipidemia and severe drying of the mucous membranes. All of these issues have been discussed in details. Monthly blood tests to monitor lipids and liver functions will be necessary. Expect painful dryness and/or fissuring around the lips, eyes, and other moist areas of the body. Balms may be protective. Contact lens may be too painful to wear temporarily while on this drug. Episodes of significant depression have been reported, including suicidal ideation and attempts in rare cases. It may also cause pseudotumor cerebri and hyperostosis. The patient will report any such changes in mood, depressive symptoms or suicidal thoughts, headaches, joint or bone pains. There is also a possible association with inflammatory bowel disease, although this is unproven at this point.

## 2018-06-01 ENCOUNTER — OFFICE VISIT (OUTPATIENT)
Dept: FAMILY MEDICINE | Facility: CLINIC | Age: 23
End: 2018-06-01
Payer: COMMERCIAL

## 2018-06-01 VITALS
TEMPERATURE: 98.1 F | WEIGHT: 215.2 LBS | SYSTOLIC BLOOD PRESSURE: 124 MMHG | HEIGHT: 61 IN | BODY MASS INDEX: 40.63 KG/M2 | DIASTOLIC BLOOD PRESSURE: 70 MMHG | HEART RATE: 96 BPM

## 2018-06-01 DIAGNOSIS — F41.1 GENERALIZED ANXIETY DISORDER: Primary | ICD-10-CM

## 2018-06-01 DIAGNOSIS — F33.1 MODERATE EPISODE OF RECURRENT MAJOR DEPRESSIVE DISORDER (H): ICD-10-CM

## 2018-06-01 PROCEDURE — 99213 OFFICE O/P EST LOW 20 MIN: CPT | Performed by: FAMILY MEDICINE

## 2018-06-01 RX ORDER — SERTRALINE HYDROCHLORIDE 100 MG/1
100 TABLET, FILM COATED ORAL DAILY
Qty: 30 TABLET | Refills: 0 | Status: SHIPPED | OUTPATIENT
Start: 2018-06-01 | End: 2019-08-20

## 2018-06-01 ASSESSMENT — PATIENT HEALTH QUESTIONNAIRE - PHQ9: 5. POOR APPETITE OR OVEREATING: SEVERAL DAYS

## 2018-06-01 ASSESSMENT — ANXIETY QUESTIONNAIRES
GAD7 TOTAL SCORE: 16
5. BEING SO RESTLESS THAT IT IS HARD TO SIT STILL: MORE THAN HALF THE DAYS
2. NOT BEING ABLE TO STOP OR CONTROL WORRYING: NEARLY EVERY DAY
6. BECOMING EASILY ANNOYED OR IRRITABLE: NEARLY EVERY DAY
1. FEELING NERVOUS, ANXIOUS, OR ON EDGE: NEARLY EVERY DAY
7. FEELING AFRAID AS IF SOMETHING AWFUL MIGHT HAPPEN: SEVERAL DAYS
3. WORRYING TOO MUCH ABOUT DIFFERENT THINGS: NEARLY EVERY DAY

## 2018-06-01 NOTE — NURSING NOTE
"Initial /70  Pulse 96  Temp 98.1  F (36.7  C) (Tympanic)  Ht 5' 1.25\" (1.556 m)  Wt 215 lb 3.2 oz (97.6 kg)  Breastfeeding? No  BMI 40.33 kg/m2 Estimated body mass index is 40.33 kg/(m^2) as calculated from the following:    Height as of this encounter: 5' 1.25\" (1.556 m).    Weight as of this encounter: 215 lb 3.2 oz (97.6 kg). .      "

## 2018-06-01 NOTE — MR AVS SNAPSHOT
After Visit Summary   6/1/2018    Chloe Obando    MRN: [id number]           Patient Information     Date Of Birth          1995        Visit Information        Provider Department      6/1/2018 3:40 PM Mervat Schroeder DO [facility]        Today's Diagnoses     Generalized anxiety disorder    -  1    Moderate episode of recurrent major depressive disorder (H)           Follow-ups after your visit        Your next 10 appointments already scheduled     Jun 20, 2018  8:20 AM CDT   [facility] Yasmine Lyman PA-C   [facility]    [address] [facility] [address]   [phone]            Jul 20, 2018  7:40 AM CDT   Return Visit with Yasmine Lyman PA-C   [facility]    [address] [facility] [address]   [phone]              Who to contact     Normal or non-critical lab and imaging results will be communicated to you by LogFirehart, letter or phone within 4 business days after the clinic has received the results. If you do not hear from us within 7 days, please contact the clinic through LogFirehart or phone. If you have a critical or abnormal lab result, we will notify you by phone as soon as possible.  Submit refill requests through [email or web address] or call your pharmacy and they will forward the refill request to us. Please allow 3 business days for your refill to be completed.          If you need to speak with a  for additional information , please call: [phone]           Additional Information About Your Visit        [email or web address] Information     [email or web address] gives you secure access to your electronic health record. If you see a primary care provider, you can also send messages to your care team and make appointments. If you have questions, please call your primary care clinic.  If you do not have a primary care provider, please call [phone] and  "they will assist you.        Care EveryWhere ID     This is your Care EveryWhere ID. This could be used by other organizations to access your [facility] medical records  [id number]        Your Vitals Were     Pulse Temperature Height Breastfeeding? BMI (Body Mass Index)       96 98.1  F (36.7  C) (Tympanic) 5' 1.25\" (1.556 m) No 40.33 kg/m2        Blood Pressure from Last 3 Encounters:   06/01/18 124/70   05/24/18 111/68   04/26/18 106/63    Weight from Last 3 Encounters:   06/01/18 215 lb 3.2 oz (97.6 kg)   03/02/18 215 lb (97.5 kg)   01/11/18 200 lb (90.7 kg)              Today, you had the following     No orders found for display         Today's Medication Changes          These changes are accurate as of 6/1/18 11:59 PM.  If you have any questions, ask your nurse or doctor.               These medicines have changed or have updated prescriptions.        Dose/Directions    sertraline 100 MG tablet   Commonly known as:  ZOLOFT   This may have changed:    - medication strength  - how much to take  - how to take this  - when to take this  - additional instructions   Used for:  Generalized anxiety disorder, Moderate episode of recurrent major depressive disorder (H)   Changed by:  Mervat Schroeder DO        Dose:  100 mg   Take 1 tablet (100 mg) by mouth daily   Quantity:  30 tablet   Refills:  0            Where to get your medicines      These medications were sent to [facility] [address] [facility]  [address] [place] [address]     Phone:  [phone]     sertraline 100 MG tablet                Primary Care Provider Office Phone # Fax #    Mervat Schroeder [phone]       [address] [place] [address]        Equal Access to Services     SUSHIL MUHAMMAD AH: Amanuel Donald, waningda lupola, qaybta kaalmada adeegyada, gus machuca So [facility] [phone].    ATENCIÓN: Si tiffaniela espwiliam, trey a gutierrez " disposición servicios gratuitos de asistencia lingüística. Anita ahmadi [phone].    We comply with applicable federal civil rights laws and [place] laws. We do not discriminate on the basis of race, color, national origin, age, disability, sex, sexual orientation, or gender identity.            Thank you!     Thank you for choosing [facility]  for your care. Our goal is always to provide you with excellent care. Hearing back from our patients is one way we can continue to improve our services. Please take a few minutes to complete the written survey that you may receive in the mail after your visit with us. Thank you!             Your Updated Medication List - Protect others around you: Learn how to safely use, store and throw away your medicines at www.disposemymeds.org.          This list is accurate as of 6/1/18 11:59 PM.  Always use your most recent med list.                   Brand Name Dispense Instructions for use Diagnosis    ISOtretinoin 40 MG capsule    ACCUTANE    30 capsule    Take 1 capsule (40 mg) by mouth daily with food    Acne vulgaris       metFORMIN 850 MG tablet    GLUCOPHAGE    180 tablet    Take 1 tablet (850 mg) by mouth 2 times daily (with meals)    Amenorrhea, secondary       norethindrone-ethinyl estradiol 1-35 MG-MCG per tablet    ORTHO-NOVUM 1-35 TAB,NORTREL 1-35 TAB    84 tablet    Take 1 tablet by mouth daily    Absence of menstruation       sertraline 100 MG tablet    ZOLOFT    30 tablet    Take 1 tablet (100 mg) by mouth daily    Generalized anxiety disorder, Moderate episode of recurrent major depressive disorder (H)

## 2018-06-01 NOTE — PROGRESS NOTES
"  SUBJECTIVE:   Chloe Obando is a 22 year old female who presents to clinic today for the following health issues:    Depression and Anxiety Follow-Up    Status since last visit: Worsened for the past 2 months    Other associated symptoms:None    Complicating factors:     Significant life event: Yes-  Stress at work and home     Current substance abuse: None    PHQ-9 12/1/2017 5/21/2018 6/1/2018   Total Score 12 9 12   Q9: Suicide Ideation Not at all Not at all Not at all     KIN-7 SCORE 12/1/2017 5/21/2018 6/1/2018   Total Score - 16 (severe anxiety) -   Total Score 12 16 16     In the past two weeks have you had thoughts of suicide or self-harm?  No.    Do you have concerns about your personal safety or the safety of others?   No  PHQ-9  English  PHQ-9   Any Language  KIN-7  Suicide Assessment Five-step Evaluation and Treatment (SAFE-T)    Amount of exercise or physical activity: 2-3 days/week for an average of greater than 60 minutes    Problems taking medications regularly: No    Medication side effects: none    Diet: regular (no restrictions)    Has been feeling overwhelmed the last few months.  Struggling financially and not sure she sees a way out.  Also feels like she has trouble saying \"no\".  Feels like she takes on more then she should.  Struggling to do her school work to complete her courses.  Feels she is not even interested in school anymore.    Enjoys spending time with friends but not even really interested in that anymore.  Struggles to find people to talk to, states she feels like when she talks to her mom her only response is that she should move back home.    Denies any suicidal ideation or thoughts of self harm  But just feels like she does not know what to do.    Problem list and histories reviewed & adjusted, as indicated.  Additional history: as documented    Reviewed and updated as needed this visit by clinical staff  Tobacco  Allergies  Meds  Med Hx  Surg Hx  Fam Hx  Soc Hx    "   Reviewed and updated as needed this visit by Provider  Tobacco  Med Hx  Surg Hx  Fam Hx  Soc Hx        ROS: Remainder of Constitutional, CV, Respiratory, GI,  negative with exception of that mentioned above    PE:  VS as above   Gen:  WN/WD/WH female in NAD   Psych: Alert and oriented times 3; coherent speech, normal   rate and volume, able to articulate logical thoughts, able   to abstract reason, no tangential thoughts, no hallucinations   or delusions  Her affect is anxious and tearful    A/P:      ICD-10-CM    1. Generalized anxiety disorder F41.1 sertraline (ZOLOFT) 100 MG tablet   2. Moderate episode of recurrent major depressive disorder (H) F33.1 sertraline (ZOLOFT) 100 MG tablet     Tolerating med well, will increase dose.    Reviewed need for counseling as well.  Pt with [facility] available to her, encouraged her to contact them to establish care.  Will place mental health referral if needed.    F/u in 4-6 weeks, sooner if any concern with medication or changes.

## 2018-06-02 ASSESSMENT — PATIENT HEALTH QUESTIONNAIRE - PHQ9: SUM OF ALL RESPONSES TO PHQ QUESTIONS 1-9: 12

## 2018-06-02 ASSESSMENT — ANXIETY QUESTIONNAIRES: GAD7 TOTAL SCORE: 16

## 2018-06-25 DIAGNOSIS — L70.0 ACNE VULGARIS: ICD-10-CM

## 2018-06-25 LAB — BETA HCG QUAL IFA URINE: NEGATIVE

## 2018-06-25 PROCEDURE — 84703 CHORIONIC GONADOTROPIN ASSAY: CPT | Performed by: PHYSICIAN ASSISTANT

## 2018-06-26 RX ORDER — ISOTRETINOIN 40 MG/1
40 CAPSULE ORAL
Qty: 30 CAPSULE | Refills: 0 | Status: SHIPPED | OUTPATIENT
Start: 2018-06-26 | End: 2018-07-12

## 2018-07-12 ENCOUNTER — OFFICE VISIT (OUTPATIENT)
Dept: DERMATOLOGY | Facility: CLINIC | Age: 23
End: 2018-07-12
Payer: COMMERCIAL

## 2018-07-12 VITALS — DIASTOLIC BLOOD PRESSURE: 69 MMHG | OXYGEN SATURATION: 97 % | HEART RATE: 67 BPM | SYSTOLIC BLOOD PRESSURE: 107 MMHG

## 2018-07-12 DIAGNOSIS — L70.0 ACNE VULGARIS: ICD-10-CM

## 2018-07-12 DIAGNOSIS — Z51.81 THERAPEUTIC DRUG MONITORING: Primary | ICD-10-CM

## 2018-07-12 LAB
ALBUMIN SERPL-MCNC: 3.8 G/DL (ref 3.4–5)
ALP SERPL-CCNC: 83 U/L (ref 40–150)
ALT SERPL W P-5'-P-CCNC: 68 U/L (ref 0–50)
ANION GAP SERPL CALCULATED.3IONS-SCNC: 7 MMOL/L (ref 3–14)
AST SERPL W P-5'-P-CCNC: 46 U/L (ref 0–45)
BETA HCG QUAL IFA URINE: NEGATIVE
BILIRUB SERPL-MCNC: 0.6 MG/DL (ref 0.2–1.3)
BUN SERPL-MCNC: 13 MG/DL (ref 7–30)
CALCIUM SERPL-MCNC: 9.1 MG/DL (ref 8.5–10.1)
CHLORIDE SERPL-SCNC: 102 MMOL/L (ref 94–109)
CHOLEST SERPL-MCNC: 194 MG/DL
CO2 SERPL-SCNC: 24 MMOL/L (ref 20–32)
CREAT SERPL-MCNC: 0.48 MG/DL (ref 0.52–1.04)
ERYTHROCYTE [DISTWIDTH] IN BLOOD BY AUTOMATED COUNT: 13.4 % (ref 10–15)
GFR SERPL CREATININE-BSD FRML MDRD: >90 ML/MIN/1.7M2
GLUCOSE SERPL-MCNC: 99 MG/DL (ref 70–99)
HCT VFR BLD AUTO: 39.5 % (ref 35–47)
HDLC SERPL-MCNC: 41 MG/DL
HGB BLD-MCNC: 13.5 G/DL (ref 11.7–15.7)
LDLC SERPL CALC-MCNC: 133 MG/DL
MCH RBC QN AUTO: 29.4 PG (ref 26.5–33)
MCHC RBC AUTO-ENTMCNC: 34.2 G/DL (ref 31.5–36.5)
MCV RBC AUTO: 86 FL (ref 78–100)
NONHDLC SERPL-MCNC: 153 MG/DL
PLATELET # BLD AUTO: 323 10E9/L (ref 150–450)
POTASSIUM SERPL-SCNC: 4 MMOL/L (ref 3.4–5.3)
PROT SERPL-MCNC: 8.5 G/DL (ref 6.8–8.8)
RBC # BLD AUTO: 4.59 10E12/L (ref 3.8–5.2)
SODIUM SERPL-SCNC: 133 MMOL/L (ref 133–144)
TRIGL SERPL-MCNC: 102 MG/DL
WBC # BLD AUTO: 11.1 10E9/L (ref 4–11)

## 2018-07-12 PROCEDURE — 84703 CHORIONIC GONADOTROPIN ASSAY: CPT | Performed by: PHYSICIAN ASSISTANT

## 2018-07-12 PROCEDURE — 36415 COLL VENOUS BLD VENIPUNCTURE: CPT | Performed by: PHYSICIAN ASSISTANT

## 2018-07-12 PROCEDURE — 85027 COMPLETE CBC AUTOMATED: CPT | Performed by: PHYSICIAN ASSISTANT

## 2018-07-12 PROCEDURE — 80053 COMPREHEN METABOLIC PANEL: CPT | Performed by: PHYSICIAN ASSISTANT

## 2018-07-12 PROCEDURE — 99213 OFFICE O/P EST LOW 20 MIN: CPT | Performed by: PHYSICIAN ASSISTANT

## 2018-07-12 PROCEDURE — 80061 LIPID PANEL: CPT | Performed by: PHYSICIAN ASSISTANT

## 2018-07-12 RX ORDER — ISOTRETINOIN 40 MG/1
40 CAPSULE ORAL
Qty: 30 CAPSULE | Refills: 0 | Status: SHIPPED | OUTPATIENT
Start: 2018-07-12 | End: 2018-08-09

## 2018-07-12 NOTE — PROGRESS NOTES
Chloe Obando is a 22 year old year old female patient here today for recheck acne vulgaris. Patient finished her 4th month of 40 mg daily with food. LFTs have been slightly elevated, this was even prior to starting isotretinoin. She reports dryness which has improved this past month. She denies any other side effects, no mood changes. This is her second round of isotretinoin.   Patient has no other skin complaints today.  Remainder of the HPI, Meds, PMH, Allergies, FH, and SH was reviewed in chart.    Pertinent Hx:   Acne Vulgaris   Past Medical History:   Diagnosis Date     Closed fracture of unspecified part of humerus     Left Humerus Fracture     Hematuria as child     Urinary tract infection, site not specified as child    nl renal U/S, no VCUG       Past Surgical History:   Procedure Laterality Date     TONSILLECTOMY ADULT Bilateral 3/15/2017    Procedure: TONSILLECTOMY ADULT;  Surgeon: Kamini Valdovinos MD;  Location: [facility]        Family History   Problem Relation Age of Onset     Diabetes Mother      gestational diabetes (history of)     Family History Negative Father      C.A.D. Maternal Grandmother      Quad. Bypass at age 74     Diabetes Maternal Grandmother      Asthma Maternal Grandmother      Lipids Maternal Grandmother      Arthritis Maternal Grandfather      Hypertension Paternal Grandmother      Lipids Paternal Grandmother      cholesterol     Other Cancer Paternal Grandmother      lung cancer     Family History Negative Paternal Grandfather        Social History     Social History     Marital status: Single     Spouse name: N/A     Number of children: N/A     Years of education: N/A     Occupational History     Not on file.     Social History Main Topics     Smoking status: Never Smoker     Smokeless tobacco: Never Used      Comment: parents don't smoke in the house     Alcohol use No     Drug use: No     Sexual activity: Yes     Partners: Male     Birth control/ protection: Condom, Pill     Other  Topics Concern     Parent/Sibling W/ Cabg, Mi Or Angioplasty Before 65f 55m? No     Social History Narrative       Outpatient Encounter Prescriptions as of 7/12/2018   Medication Sig Dispense Refill     ISOtretinoin (ACCUTANE) 40 MG capsule Take 1 capsule (40 mg) by mouth daily with food 30 capsule 0     metFORMIN (GLUCOPHAGE) 850 MG tablet Take 1 tablet (850 mg) by mouth 2 times daily (with meals) 180 tablet 3     norethindrone-ethinyl estradiol (ORTHO-NOVUM 1-35 TAB,NORTREL 1-35 TAB) 1-35 MG-MCG per tablet Take 1 tablet by mouth daily 84 tablet 3     sertraline (ZOLOFT) 100 MG tablet Take 1 tablet (100 mg) by mouth daily 30 tablet 0     [DISCONTINUED] ISOtretinoin (ACCUTANE) 40 MG capsule Take 1 capsule (40 mg) by mouth daily with food 30 capsule 0     No facility-administered encounter medications on file as of 7/12/2018.              Review Of Systems  Skin: As above  Eyes: negative  Ears/Nose/Throat: negative  Respiratory: No shortness of breath, dyspnea on exertion, cough, or hemoptysis  Cardiovascular: negative  Gastrointestinal: negative  Genitourinary: negative  Musculoskeletal: negative  Neurologic: negative  Psychiatric: negative  Hematologic/Lymphatic/Immunologic: negative  Endocrine: negative      O:   NAD, WDWN, Alert & Oriented, Mood & Affect wnl, Vitals stable   Here today alone   /69  Pulse 67  SpO2 97%   General appearance normal   Vitals stable   Alert, oriented and in no acute distress     No visible inflammatory papules seen       Eyes: Conjunctivae/lids:Normal     ENT: Lips: normal    MSK:Normal    Cardiovascular: peripheral edema none    Pulm: Breathing Normal    Neuro/Psych: Orientation:Normal; Mood/Affect:Normal  A/P:  1. Acne Vulgaris  - doing great. No side effects.   Continue Isotretinoin 40 mg daily.   Patient is aware of depression side effects and she will make us or PCP aware if mood changes. She notes that it is well controlled and did not have any issues with mood in the  past with isotretinoin.   Standing hcg, CBC, CMP and fasting lipids  Ipledge reviewed with patient and Ipledge consent form complete  Patient place in ipledge system  Contraception: 1. Birth control pills 2. Condoms   Current Dosage: 4800 mg   Goal Dosage: 11,727 mg (120 mg/kg)  Ipledge: [id number]  Return to clinic 30 days  Dry lips and mouth, minor swelling of the eyelids or lips, crusty skin, nosebleeds, GI upset, or thinning of hair may occur. If any of these effects persist or worsen, tell your doctor or pharmacist promptly.   To relieve dry mouth, suck on (sugarless) hard candy or ice chips, chew (sugarless) gum, drink water.   Remember that your doctor has prescribed this medication because he or she has judged that the benefit to you is greater than the risk of side effects. Many people using this medication do not have serious side effects.   Contact office immediately if you have any of these unlikely but serious side effects: mental/mood changes (e.g., depression,  aggressive or violent behavior, and in rare cases, thoughts of suicide), tingling feeling in the skin, quick/severe sun sensitivity, back/joint/muscle pain, signs of infection (e.g., fever, persistent sore throat, painful swallowing, peeling skin on palms/soles.   Isotretinoin may infrequently cause disease of the pancreatitis, that may rarely be fatal. Stop taking this medication and contact office immediately if you develop: severe stomach pain severe or persistent GI upset,   Stop taking this medication and tell your doctor immediately if you develop these unlikely but very serious side effects: severe headache, vision changes, ear ringing, hearling loss, chest pain, yellowing eyes, skin, dark urine, severe diarrhea, rectal bleeding,   Seek immediate medical attention if you notice any symptoms of a serious allergic reaction.      Accutane is discussed fully with the patient. It is a very effective drug to treat acne vulgaris but has many  potential significant side effects. Chief among these are teratogensis, hepatic injury, dyslipidemia and severe drying of the mucous membranes. All of these issues have been discussed in details. Monthly blood tests to monitor lipids and liver functions will be necessary. Expect painful dryness and/or fissuring around the lips, eyes, and other moist areas of the body. Balms may be protective. Contact lens may be too painful to wear temporarily while on this drug. Episodes of significant depression have been reported, including suicidal ideation and attempts in rare cases. It may also cause pseudotumor cerebri and hyperostosis. The patient will report any such changes in mood, depressive symptoms or suicidal thoughts, headaches, joint or bone pains. There is also a possible association with inflammatory bowel disease, although this is unproven at this point.

## 2018-07-12 NOTE — LETTER
7/12/2018         RE: Chloe Obando  [address] [address]        Dear Colleague,    Thank you for referring your patient, Chloe Obando, to the [facility]. Please see a copy of my visit note below.    Chloe Obando is a 22 year old year old female patient here today for recheck acne vulgaris. Patient finished her 4th month of 40 mg daily with food. LFTs have been slightly elevated, this was even prior to starting isotretinoin. She reports dryness which has improved this past month. She denies any other side effects, no mood changes. This is her second round of isotretinoin.   Patient has no other skin complaints today.  Remainder of the HPI, Meds, PMH, Allergies, FH, and SH was reviewed in chart.    Pertinent Hx:   Acne Vulgaris   Past Medical History:   Diagnosis Date     Closed fracture of unspecified part of humerus     Left Humerus Fracture     Hematuria as child     Urinary tract infection, site not specified as child    nl renal U/S, no VCUG       Past Surgical History:   Procedure Laterality Date     TONSILLECTOMY ADULT Bilateral 3/15/2017    Procedure: TONSILLECTOMY ADULT;  Surgeon: Kamini Valdovinos MD;  Location: [facility]        Family History   Problem Relation Age of Onset     Diabetes Mother      gestational diabetes (history of)     Family History Negative Father      C.A.D. Maternal Grandmother      Quad. Bypass at age 74     Diabetes Maternal Grandmother      Asthma Maternal Grandmother      Lipids Maternal Grandmother      Arthritis Maternal Grandfather      Hypertension Paternal Grandmother      Lipids Paternal Grandmother      cholesterol     Other Cancer Paternal Grandmother      lung cancer     Family History Negative Paternal Grandfather        Social History     Social History     Marital status: Single     Spouse name: N/A     Number of children: N/A     Years of education: N/A     Occupational History     Not on file.     Social History Main Topics     Smoking  status: Never Smoker     Smokeless tobacco: Never Used      Comment: parents don't smoke in the house     Alcohol use No     Drug use: No     Sexual activity: Yes     Partners: Male     Birth control/ protection: Condom, Pill     Other Topics Concern     Parent/Sibling W/ Cabg, Mi Or Angioplasty Before 65f 55m? No     Social History Narrative       Outpatient Encounter Prescriptions as of 7/12/2018   Medication Sig Dispense Refill     ISOtretinoin (ACCUTANE) 40 MG capsule Take 1 capsule (40 mg) by mouth daily with food 30 capsule 0     metFORMIN (GLUCOPHAGE) 850 MG tablet Take 1 tablet (850 mg) by mouth 2 times daily (with meals) 180 tablet 3     norethindrone-ethinyl estradiol (ORTHO-NOVUM 1-35 TAB,NORTREL 1-35 TAB) 1-35 MG-MCG per tablet Take 1 tablet by mouth daily 84 tablet 3     sertraline (ZOLOFT) 100 MG tablet Take 1 tablet (100 mg) by mouth daily 30 tablet 0     [DISCONTINUED] ISOtretinoin (ACCUTANE) 40 MG capsule Take 1 capsule (40 mg) by mouth daily with food 30 capsule 0     No facility-administered encounter medications on file as of 7/12/2018.              Review Of Systems  Skin: As above  Eyes: negative  Ears/Nose/Throat: negative  Respiratory: No shortness of breath, dyspnea on exertion, cough, or hemoptysis  Cardiovascular: negative  Gastrointestinal: negative  Genitourinary: negative  Musculoskeletal: negative  Neurologic: negative  Psychiatric: negative  Hematologic/Lymphatic/Immunologic: negative  Endocrine: negative      O:   NAD, WDWN, Alert & Oriented, Mood & Affect wnl, Vitals stable   Here today alone   /69  Pulse 67  SpO2 97%   General appearance normal   Vitals stable   Alert, oriented and in no acute distress     No visible inflammatory papules seen       Eyes: Conjunctivae/lids:Normal     ENT: Lips: normal    MSK:Normal    Cardiovascular: peripheral edema none    Pulm: Breathing Normal    Neuro/Psych: Orientation:Normal; Mood/Affect:Normal  A/P:  1. Acne Vulgaris  - doing great.  No side effects.   Continue Isotretinoin 40 mg daily.   Patient is aware of depression side effects and she will make us or PCP aware if mood changes. She notes that it is well controlled and did not have any issues with mood in the past with isotretinoin.   Standing hcg, CBC, CMP and fasting lipids  Ipledge reviewed with patient and Ipledge consent form complete  Patient place in ipledge system  Contraception: 1. Birth control pills 2. Condoms   Current Dosage: 4800 mg   Goal Dosage: 11,727 mg (120 mg/kg)  Ipledge: [id number]  Return to clinic 30 days  Dry lips and mouth, minor swelling of the eyelids or lips, crusty skin, nosebleeds, GI upset, or thinning of hair may occur. If any of these effects persist or worsen, tell your doctor or pharmacist promptly.   To relieve dry mouth, suck on (sugarless) hard candy or ice chips, chew (sugarless) gum, drink water.   Remember that your doctor has prescribed this medication because he or she has judged that the benefit to you is greater than the risk of side effects. Many people using this medication do not have serious side effects.   Contact office immediately if you have any of these unlikely but serious side effects: mental/mood changes (e.g., depression,  aggressive or violent behavior, and in rare cases, thoughts of suicide), tingling feeling in the skin, quick/severe sun sensitivity, back/joint/muscle pain, signs of infection (e.g., fever, persistent sore throat, painful swallowing, peeling skin on palms/soles.   Isotretinoin may infrequently cause disease of the pancreatitis, that may rarely be fatal. Stop taking this medication and contact office immediately if you develop: severe stomach pain severe or persistent GI upset,   Stop taking this medication and tell your doctor immediately if you develop these unlikely but very serious side effects: severe headache, vision changes, ear ringing, hearling loss, chest pain, yellowing eyes, skin, dark urine, severe  diarrhea, rectal bleeding,   Seek immediate medical attention if you notice any symptoms of a serious allergic reaction.      Accutane is discussed fully with the patient. It is a very effective drug to treat acne vulgaris but has many potential significant side effects. Chief among these are teratogensis, hepatic injury, dyslipidemia and severe drying of the mucous membranes. All of these issues have been discussed in details. Monthly blood tests to monitor lipids and liver functions will be necessary. Expect painful dryness and/or fissuring around the lips, eyes, and other moist areas of the body. Balms may be protective. Contact lens may be too painful to wear temporarily while on this drug. Episodes of significant depression have been reported, including suicidal ideation and attempts in rare cases. It may also cause pseudotumor cerebri and hyperostosis. The patient will report any such changes in mood, depressive symptoms or suicidal thoughts, headaches, joint or bone pains. There is also a possible association with inflammatory bowel disease, although this is unproven at this point.     Again, thank you for allowing me to participate in the care of your patient.        Sincerely,        Trupti Sosa PA-C

## 2018-07-12 NOTE — NURSING NOTE
Chief Complaint   Patient presents with     Accutane     fu       Vitals:    07/12/18 1201   BP: 107/69   Pulse: 67   SpO2: 97%     Wt Readings from Last 1 Encounters:   06/01/18 97.6 kg (215 lb 3.2 oz)     Piedad Malagon LPN.................7/12/2018

## 2018-07-12 NOTE — MR AVS SNAPSHOT
After Visit Summary   7/12/2018    Chloe Obando    MRN: [id number]           Patient Information     Date Of Birth          1995        Visit Information        Provider Department      7/12/2018 12:00 PM Trupti Bartlett PA-C [facility]        Today's Diagnoses     Therapeutic drug monitoring    -  1    Acne vulgaris           Follow-ups after your visit        Your next 10 appointments already scheduled     Aug 09, 2018  2:00 PM CDT   [facility] Y[facility]-C   [facility]    [address] [address]   [phone]            Sep 06, 2018  7:00 AM CDT   [facility] Visit [facility] Yasmine Lyman PA-C   [facility]    [address] [address]   [phone]            Oct 04, 2018  7:00 AM CDT   [facility] Visit with Yasmine Lyman PA-C   [facility]    [address] [address]   [phone]              Who to contact     If you have questions or need follow up information about today's clinic visit or your schedule please contact [facility] directly at [phone].  Normal or non-critical lab and imaging results will be communicated to you by MyChart, letter or phone within 4 business days after the clinic has received the results. If you do not hear from us within 7 days, please contact the clinic through MyChart or phone. If you have a critical or abnormal lab result, we will notify you by phone as soon as possible.  Submit refill requests through [facility] or call your pharmacy and they will forward the refill request to us. Please allow 3 business days for your refill to be completed.          Additional Information About Your Visit        [employer] Information     [facility] gives you secure access to your electronic health record. If you see a primary care  provider, you can also send messages to your care team and make appointments. If you have questions, please call your primary care clinic.  If you do not have a primary care provider, please call [phone] and they will assist you.        Care EveryWhere ID     This is your Care EveryWhere ID. This could be used by other organizations to access your [facility] medical records  [id number]        Your Vitals Were     Pulse Pulse Oximetry                67 97%           Blood Pressure from Last 3 Encounters:   07/12/18 107/69   06/01/18 124/70   05/24/18 111/68    Weight from Last 3 Encounters:   06/01/18 97.6 kg (215 lb 3.2 oz)   03/02/18 97.5 kg (215 lb)   01/11/18 90.7 kg (200 lb)              Today, you had the following     No orders found for display         Where to get your medicines      These medications were sent to [facility] [address]  [address] [address]     Phone:  [phone]     ISOtretinoin 40 MG capsule          Primary Care Provider Office Phone # Fax #    Mervat Jemma Schroeder [phone] [address] [place] [address]        Equal Access to Services     SUSHIL MUHAMMAD AH: Hadii abiel ku hadasho Soomaali, waaxda luqadaha, qaybta kaalmada adeegyada, gus foy haysuzan gomez. So [facility] [phone].    ATENCIÓN: Si habla español, tiene a gutierrez disposición servicios gratuitos de asistencia lingüística. Llame al [phone].    We comply with applicable federal civil rights laws and [place] laws. We do not discriminate on the basis of race, color, national origin, age, disability, sex, sexual orientation, or gender identity.            Thank you!     Thank you for choosing [facility]  for your care. Our goal is always to provide you with excellent care. Hearing back from our patients is one way we can continue to improve our services. Please take a few minutes to complete the written survey that  you may receive in the mail after your visit with us. Thank you!             Your Updated Medication List - Protect others around you: Learn how to safely use, store and throw away your medicines at www.disposemymeds.org.          This list is accurate as of 7/12/18 12:29 PM.  Always use your most recent med list.                   Brand Name Dispense Instructions for use Diagnosis    ISOtretinoin 40 MG capsule    ACCUTANE    30 capsule    Take 1 capsule (40 mg) by mouth daily with food    Acne vulgaris       metFORMIN 850 MG tablet    GLUCOPHAGE    180 tablet    Take 1 tablet (850 mg) by mouth 2 times daily (with meals)    Amenorrhea, secondary       norethindrone-ethinyl estradiol 1-35 MG-MCG per tablet    ORTHO-NOVUM 1-35 TAB,NORTREL 1-35 TAB    84 tablet    Take 1 tablet by mouth daily    Absence of menstruation       sertraline 100 MG tablet    ZOLOFT    30 tablet    Take 1 tablet (100 mg) by mouth daily    Generalized anxiety disorder, Moderate episode of recurrent major depressive disorder (H)

## 2018-08-09 ENCOUNTER — OFFICE VISIT (OUTPATIENT)
Dept: DERMATOLOGY | Facility: CLINIC | Age: 23
End: 2018-08-09
Payer: COMMERCIAL

## 2018-08-09 VITALS — HEART RATE: 91 BPM | SYSTOLIC BLOOD PRESSURE: 94 MMHG | DIASTOLIC BLOOD PRESSURE: 68 MMHG | OXYGEN SATURATION: 96 %

## 2018-08-09 DIAGNOSIS — L70.0 ACNE VULGARIS: ICD-10-CM

## 2018-08-09 LAB
ALBUMIN SERPL-MCNC: 4 G/DL (ref 3.4–5)
ALP SERPL-CCNC: 83 U/L (ref 40–150)
ALT SERPL W P-5'-P-CCNC: 58 U/L (ref 0–50)
ANION GAP SERPL CALCULATED.3IONS-SCNC: 10 MMOL/L (ref 3–14)
AST SERPL W P-5'-P-CCNC: 33 U/L (ref 0–45)
BETA HCG QUAL IFA URINE: NEGATIVE
BILIRUB SERPL-MCNC: 0.6 MG/DL (ref 0.2–1.3)
BUN SERPL-MCNC: 13 MG/DL (ref 7–30)
CALCIUM SERPL-MCNC: 9.5 MG/DL (ref 8.5–10.1)
CHLORIDE SERPL-SCNC: 104 MMOL/L (ref 94–109)
CHOLEST SERPL-MCNC: 204 MG/DL
CO2 SERPL-SCNC: 23 MMOL/L (ref 20–32)
CREAT SERPL-MCNC: 0.6 MG/DL (ref 0.52–1.04)
ERYTHROCYTE [DISTWIDTH] IN BLOOD BY AUTOMATED COUNT: 13.4 % (ref 10–15)
GFR SERPL CREATININE-BSD FRML MDRD: >90 ML/MIN/1.7M2
GLUCOSE SERPL-MCNC: 100 MG/DL (ref 70–99)
HCT VFR BLD AUTO: 40.1 % (ref 35–47)
HDLC SERPL-MCNC: 34 MG/DL
HGB BLD-MCNC: 13.7 G/DL (ref 11.7–15.7)
LDLC SERPL CALC-MCNC: 139 MG/DL
MCH RBC QN AUTO: 29.3 PG (ref 26.5–33)
MCHC RBC AUTO-ENTMCNC: 34.2 G/DL (ref 31.5–36.5)
MCV RBC AUTO: 86 FL (ref 78–100)
NONHDLC SERPL-MCNC: 170 MG/DL
PLATELET # BLD AUTO: 342 10E9/L (ref 150–450)
POTASSIUM SERPL-SCNC: 3.8 MMOL/L (ref 3.4–5.3)
PROT SERPL-MCNC: 8.5 G/DL (ref 6.8–8.8)
RBC # BLD AUTO: 4.68 10E12/L (ref 3.8–5.2)
SODIUM SERPL-SCNC: 137 MMOL/L (ref 133–144)
TRIGL SERPL-MCNC: 156 MG/DL
WBC # BLD AUTO: 10.9 10E9/L (ref 4–11)

## 2018-08-09 PROCEDURE — 99213 OFFICE O/P EST LOW 20 MIN: CPT | Performed by: PHYSICIAN ASSISTANT

## 2018-08-09 PROCEDURE — 80061 LIPID PANEL: CPT | Performed by: PHYSICIAN ASSISTANT

## 2018-08-09 PROCEDURE — 84703 CHORIONIC GONADOTROPIN ASSAY: CPT | Performed by: PHYSICIAN ASSISTANT

## 2018-08-09 PROCEDURE — 36415 COLL VENOUS BLD VENIPUNCTURE: CPT | Performed by: PHYSICIAN ASSISTANT

## 2018-08-09 PROCEDURE — 80053 COMPREHEN METABOLIC PANEL: CPT | Performed by: PHYSICIAN ASSISTANT

## 2018-08-09 PROCEDURE — 85027 COMPLETE CBC AUTOMATED: CPT | Performed by: PHYSICIAN ASSISTANT

## 2018-08-09 RX ORDER — ISOTRETINOIN 40 MG/1
40 CAPSULE ORAL
Qty: 30 CAPSULE | Refills: 0 | Status: SHIPPED | OUTPATIENT
Start: 2018-08-09 | End: 2018-12-06 | Stop reason: DRUGHIGH

## 2018-08-09 NOTE — NURSING NOTE
"Initial BP 94/68  Pulse 91  SpO2 96% Estimated body mass index is 40.33 kg/(m^2) as calculated from the following:    Height as of 6/1/18: 1.556 m (5' 1.25\").    Weight as of 6/1/18: 97.6 kg (215 lb 3.2 oz). .    Leonila Garcia LPN    "

## 2018-08-09 NOTE — MR AVS SNAPSHOT
After Visit Summary   8/9/2018    Chloe Obando    MRN: [id number]           Patient Information     Date Of Birth          1995        Visit Information        Provider Department      8/9/2018 2:00 PM Yasmine Lyman PA-C [facility]        Today's Diagnoses     Acne vulgaris           Follow-ups after your visit        Your next 10 appointments already scheduled     Sep 06, 2018  7:00 AM CDT   Return Visit with Yasmine Lyman PA-C   [facility] ([facility]    [address] [address]   [phone]            Oct 04, 2018  7:00 AM CDT   Return Visit with Yasmine Lyman PA-C   [facility] ([facility]    [address] [address]   [phone]              Who to contact     If you have questions or need follow up information about today's clinic visit or your schedule please contact [facility] directly at [phone].  Normal or non-critical lab and imaging results will be communicated to you by [employer], letter or phone within 4 business days after the clinic has received the results. If you do not hear from us within 7 days, please contact the clinic through [email or web address]t or phone. If you have a critical or abnormal lab result, we will notify you by phone as soon as possible.  Submit refill requests through [employer] or call your pharmacy and they will forward the refill request to us. Please allow 3 business days for your refill to be completed.          Additional Information About Your Visit        [employer] Information     [employer] gives you secure access to your electronic health record. If you see a primary care provider, you can also send messages to your care team and make appointments. If you have questions, please call your primary care clinic.  If you do not have a primary care provider, please call [phone] and they will assist you.         Care EveryWhere ID     This is your Care EveryWhere ID. This could be used by other organizations to access your [facility] medical records  [id number]        Your Vitals Were     Pulse Pulse Oximetry                91 96%           Blood Pressure from Last 3 Encounters:   08/09/18 94/68   07/12/18 107/69   06/01/18 124/70    Weight from Last 3 Encounters:   06/01/18 97.6 kg (215 lb 3.2 oz)   03/02/18 97.5 kg (215 lb)   01/11/18 90.7 kg (200 lb)              Today, you had the following     No orders found for display         Where to get your medicines      These medications were sent to [facility] - [place], [place] - [address]  [address] [address]     Phone:  [phone]     ISOtretinoin 40 MG capsule          Primary Care Provider Office Phone # Fax #    Mervat Easton Alexandre,  [phone] [address] [place] [address]        Equal Access to Services     SUSHIL MUHAMMAD : Hadii aad ku hadasho Soomaali, waaxda luqadaha, qaybta kaalmada adeegyada, waxay idiin hayidan abhinav evans . So [facility] [phone].    ATENCIÓN: Si habla español, tiene a gutierrez disposición servicios gratuitos de asistencia lingüística. Llame al [phone].    We comply with applicable federal civil rights laws and [place] laws. We do not discriminate on the basis of race, color, national origin, age, disability, sex, sexual orientation, or gender identity.            Thank you!     Thank you for choosing [facility]  for your care. Our goal is always to provide you with excellent care. Hearing back from our patients is one way we can continue to improve our services. Please take a few minutes to complete the written survey that you may receive in the mail after your visit with us. Thank you!             Your Updated Medication List - Protect others around you: Learn how to safely use, store and throw away your medicines at www.disposemymeds.org.          This list  is accurate as of 8/9/18  4:16 PM.  Always use your most recent med list.                   Brand Name Dispense Instructions for use Diagnosis    ISOtretinoin 40 MG capsule    ACCUTANE    30 capsule    Take 1 capsule (40 mg) by mouth daily with food    Acne vulgaris       metFORMIN 850 MG tablet    GLUCOPHAGE    180 tablet    Take 1 tablet (850 mg) by mouth 2 times daily (with meals)    Amenorrhea, secondary       norethindrone-ethinyl estradiol 1-35 MG-MCG per tablet    ORTHO-NOVUM 1-35 TAB,NORTREL 1-35 TAB    84 tablet    Take 1 tablet by mouth daily    Absence of menstruation       sertraline 100 MG tablet    ZOLOFT    30 tablet    Take 1 tablet (100 mg) by mouth daily    Generalized anxiety disorder, Moderate episode of recurrent major depressive disorder (H)

## 2018-08-09 NOTE — PROGRESS NOTES
Chloe Obando is a 22 year old year old female patient here today for recheck acne vulgaris. Patient finished her 5th month of 40 mg daily with food. LFTs have been slightly elevated, this was even prior to starting isotretinoin. She reports dryness which has improved this past month. She denies any other side effects, no mood changes. This is her second round of isotretinoin.   Patient has no other skin complaints today.   Patient has no other skin complaints today.  Remainder of the HPI, Meds, PMH, Allergies, FH, and SH was reviewed in chart.    Pertinent Hx:   Acne Vulgaris   Past Medical History:   Diagnosis Date     Closed fracture of unspecified part of humerus     Left Humerus Fracture     Hematuria as child     Urinary tract infection, site not specified as child    nl renal U/S, no VCUG       Past Surgical History:   Procedure Laterality Date     TONSILLECTOMY ADULT Bilateral 3/15/2017    Procedure: TONSILLECTOMY ADULT;  Surgeon: Kamini Valdovinos MD;  Location: [facility] OR        Family History   Problem Relation Age of Onset     Diabetes Mother      gestational diabetes (history of)     Family History Negative Father      C.A.D. Maternal Grandmother      Quad. Bypass at age 74     Diabetes Maternal Grandmother      Asthma Maternal Grandmother      Lipids Maternal Grandmother      Arthritis Maternal Grandfather      Hypertension Paternal Grandmother      Lipids Paternal Grandmother      cholesterol     Other Cancer Paternal Grandmother      lung cancer     Family History Negative Paternal Grandfather        Social History     Social History     Marital status: Single     Spouse name: N/A     Number of children: N/A     Years of education: N/A     Occupational History     Not on file.     Social History Main Topics     Smoking status: Never Smoker     Smokeless tobacco: Never Used      Comment: parents don't smoke in the house     Alcohol use No     Drug use: No     Sexual activity: Yes     Partners: Male     Birth  control/ protection: Condom, Pill     Other Topics Concern     Parent/Sibling W/ Cabg, Mi Or Angioplasty Before 65f 55m? No     Social History Narrative       Outpatient Encounter Prescriptions as of 8/9/2018   Medication Sig Dispense Refill     ISOtretinoin (ACCUTANE) 40 MG capsule Take 1 capsule (40 mg) by mouth daily with food 30 capsule 0     metFORMIN (GLUCOPHAGE) 850 MG tablet Take 1 tablet (850 mg) by mouth 2 times daily (with meals) 180 tablet 3     norethindrone-ethinyl estradiol (ORTHO-NOVUM 1-35 TAB,NORTREL 1-35 TAB) 1-35 MG-MCG per tablet Take 1 tablet by mouth daily 84 tablet 3     sertraline (ZOLOFT) 100 MG tablet Take 1 tablet (100 mg) by mouth daily 30 tablet 0     [DISCONTINUED] ISOtretinoin (ACCUTANE) 40 MG capsule Take 1 capsule (40 mg) by mouth daily with food 30 capsule 0     No facility-administered encounter medications on file as of 8/9/2018.              Review Of Systems  Skin: As above  Eyes: negative  Ears/Nose/Throat: negative  Respiratory: No shortness of breath, dyspnea on exertion, cough, or hemoptysis  Cardiovascular: negative  Gastrointestinal: negative  Genitourinary: negative  Musculoskeletal: negative  Neurologic: negative  Psychiatric: negative  Hematologic/Lymphatic/Immunologic: negative  Endocrine: negative      O:   NAD, WDWN, Alert & Oriented, Mood & Affect wnl, Vitals stable   Here today alone   BP 94/68  Pulse 91  SpO2 96%   General appearance normal   Vitals stable   Alert, oriented and in no acute distress     No visible inflammatory papules       Eyes: Conjunctivae/lids:Normal     ENT: Lips: normal    MSK:Normal    Pulm: Breathing Normal    Neuro/Psych: Orientation:Normal; Mood/Affect:Normal  A/P:  1. Acne Vulgaris  - doing great. No side effects.   Continue Isotretinoin 40 mg daily.   Patient is aware of depression side effects and she will make us or PCP aware if mood changes. She notes that it is well controlled and did not have any issues with mood in the past  with isotretinoin.   Standing hcg, CBC, CMP and fasting lipids  Ipledge reviewed with patient and Ipledge consent form complete  Patient place in ipledge system  Contraception: 1. Birth control pills 2. Condoms   Current Dosage: 6000 mg   Goal Dosage: 11,727 mg (120 mg/kg)  Ipledge: [id number]  Return to clinic 30 days  Dry lips and mouth, minor swelling of the eyelids or lips, crusty skin, nosebleeds, GI upset, or thinning of hair may occur. If any of these effects persist or worsen, tell your doctor or pharmacist promptly.   To relieve dry mouth, suck on (sugarless) hard candy or ice chips, chew (sugarless) gum, drink water.   Remember that your doctor has prescribed this medication because he or she has judged that the benefit to you is greater than the risk of side effects. Many people using this medication do not have serious side effects.   Contact office immediately if you have any of these unlikely but serious side effects: mental/mood changes (e.g., depression,  aggressive or violent behavior, and in rare cases, thoughts of suicide), tingling feeling in the skin, quick/severe sun sensitivity, back/joint/muscle pain, signs of infection (e.g., fever, persistent sore throat, painful swallowing, peeling skin on palms/soles.   Isotretinoin may infrequently cause disease of the pancreatitis, that may rarely be fatal. Stop taking this medication and contact office immediately if you develop: severe stomach pain severe or persistent GI upset,   Stop taking this medication and tell your doctor immediately if you develop these unlikely but very serious side effects: severe headache, vision changes, ear ringing, hearling loss, chest pain, yellowing eyes, skin, dark urine, severe diarrhea, rectal bleeding,   Seek immediate medical attention if you notice any symptoms of a serious allergic reaction.      Accutane is discussed fully with the patient. It is a very effective drug to treat acne vulgaris but has many  potential significant side effects. Chief among these are teratogensis, hepatic injury, dyslipidemia and severe drying of the mucous membranes. All of these issues have been discussed in details. Monthly blood tests to monitor lipids and liver functions will be necessary. Expect painful dryness and/or fissuring around the lips, eyes, and other moist areas of the body. Balms may be protective. Contact lens may be too painful to wear temporarily while on this drug. Episodes of significant depression have been reported, including suicidal ideation and attempts in rare cases. It may also cause pseudotumor cerebri and hyperostosis. The patient will report any such changes in mood, depressive symptoms or suicidal thoughts, headaches, joint or bone pains. There is also a possible association with inflammatory bowel disease, although this is unproven at this point.

## 2018-08-09 NOTE — LETTER
8/9/2018         RE: Chloe Obando  [address] [address]        Dear Colleague,    Thank you for referring your patient, Chloe Obando, to the [facility]. Please see a copy of my visit note below.    Chloe Obando is a 22 year old year old female patient here today for recheck acne vulgaris. Patient finished her 5th month of 40 mg daily with food. LFTs have been slightly elevated, this was even prior to starting isotretinoin. She reports dryness which has improved this past month. She denies any other side effects, no mood changes. This is her second round of isotretinoin.   Patient has no other skin complaints today.   Patient has no other skin complaints today.  Remainder of the HPI, Meds, PMH, Allergies, FH, and SH was reviewed in chart.    Pertinent Hx:   Acne Vulgaris   Past Medical History:   Diagnosis Date     Closed fracture of unspecified part of humerus     Left Humerus Fracture     Hematuria as child     Urinary tract infection, site not specified as child    nl renal U/S, no VCUG       Past Surgical History:   Procedure Laterality Date     TONSILLECTOMY ADULT Bilateral 3/15/2017    Procedure: TONSILLECTOMY ADULT;  Surgeon: Kamini Valdovinos MD;  Location: [facility] OR        Family History   Problem Relation Age of Onset     Diabetes Mother      gestational diabetes (history of)     Family History Negative Father      C.A.D. Maternal Grandmother      Quad. Bypass at age 74     Diabetes Maternal Grandmother      Asthma Maternal Grandmother      Lipids Maternal Grandmother      Arthritis Maternal Grandfather      Hypertension Paternal Grandmother      Lipids Paternal Grandmother      cholesterol     Other Cancer Paternal Grandmother      lung cancer     Family History Negative Paternal Grandfather        Social History     Social History     Marital status: Single     Spouse name: N/A     Number of children: N/A     Years of education: N/A     Occupational History     Not on  file.     Social History Main Topics     Smoking status: Never Smoker     Smokeless tobacco: Never Used      Comment: parents don't smoke in the house     Alcohol use No     Drug use: No     Sexual activity: Yes     Partners: Male     Birth control/ protection: Condom, Pill     Other Topics Concern     Parent/Sibling W/ Cabg, Mi Or Angioplasty Before 65f 55m? No     Social History Narrative       Outpatient Encounter Prescriptions as of 8/9/2018   Medication Sig Dispense Refill     ISOtretinoin (ACCUTANE) 40 MG capsule Take 1 capsule (40 mg) by mouth daily with food 30 capsule 0     metFORMIN (GLUCOPHAGE) 850 MG tablet Take 1 tablet (850 mg) by mouth 2 times daily (with meals) 180 tablet 3     norethindrone-ethinyl estradiol (ORTHO-NOVUM 1-35 TAB,NORTREL 1-35 TAB) 1-35 MG-MCG per tablet Take 1 tablet by mouth daily 84 tablet 3     sertraline (ZOLOFT) 100 MG tablet Take 1 tablet (100 mg) by mouth daily 30 tablet 0     [DISCONTINUED] ISOtretinoin (ACCUTANE) 40 MG capsule Take 1 capsule (40 mg) by mouth daily with food 30 capsule 0     No facility-administered encounter medications on file as of 8/9/2018.              Review Of Systems  Skin: As above  Eyes: negative  Ears/Nose/Throat: negative  Respiratory: No shortness of breath, dyspnea on exertion, cough, or hemoptysis  Cardiovascular: negative  Gastrointestinal: negative  Genitourinary: negative  Musculoskeletal: negative  Neurologic: negative  Psychiatric: negative  Hematologic/Lymphatic/Immunologic: negative  Endocrine: negative      O:   NAD, WDWN, Alert & Oriented, Mood & Affect wnl, Vitals stable   Here today alone   BP 94/68  Pulse 91  SpO2 96%   General appearance normal   Vitals stable   Alert, oriented and in no acute distress     No visible inflammatory papules       Eyes: Conjunctivae/lids:Normal     ENT: Lips: normal    MSK:Normal    Pulm: Breathing Normal    Neuro/Psych: Orientation:Normal; Mood/Affect:Normal  A/P:  1. Acne Vulgaris  - doing great.  No side effects.   Continue Isotretinoin 40 mg daily.   Patient is aware of depression side effects and she will make us or PCP aware if mood changes. She notes that it is well controlled and did not have any issues with mood in the past with isotretinoin.   Standing hcg, CBC, CMP and fasting lipids  Ipledge reviewed with patient and Ipledge consent form complete  Patient place in ipledge system  Contraception: 1. Birth control pills 2. Condoms   Current Dosage: 6000 mg   Goal Dosage: 11,727 mg (120 mg/kg)  Ipledge: [id number]  Return to clinic 30 days  Dry lips and mouth, minor swelling of the eyelids or lips, crusty skin, nosebleeds, GI upset, or thinning of hair may occur. If any of these effects persist or worsen, tell your doctor or pharmacist promptly.   To relieve dry mouth, suck on (sugarless) hard candy or ice chips, chew (sugarless) gum, drink water.   Remember that your doctor has prescribed this medication because he or she has judged that the benefit to you is greater than the risk of side effects. Many people using this medication do not have serious side effects.   Contact office immediately if you have any of these unlikely but serious side effects: mental/mood changes (e.g., depression,  aggressive or violent behavior, and in rare cases, thoughts of suicide), tingling feeling in the skin, quick/severe sun sensitivity, back/joint/muscle pain, signs of infection (e.g., fever, persistent sore throat, painful swallowing, peeling skin on palms/soles.   Isotretinoin may infrequently cause disease of the pancreatitis, that may rarely be fatal. Stop taking this medication and contact office immediately if you develop: severe stomach pain severe or persistent GI upset,   Stop taking this medication and tell your doctor immediately if you develop these unlikely but very serious side effects: severe headache, vision changes, ear ringing, hearling loss, chest pain, yellowing eyes, skin, dark urine, severe  diarrhea, rectal bleeding,   Seek immediate medical attention if you notice any symptoms of a serious allergic reaction.      Accutane is discussed fully with the patient. It is a very effective drug to treat acne vulgaris but has many potential significant side effects. Chief among these are teratogensis, hepatic injury, dyslipidemia and severe drying of the mucous membranes. All of these issues have been discussed in details. Monthly blood tests to monitor lipids and liver functions will be necessary. Expect painful dryness and/or fissuring around the lips, eyes, and other moist areas of the body. Balms may be protective. Contact lens may be too painful to wear temporarily while on this drug. Episodes of significant depression have been reported, including suicidal ideation and attempts in rare cases. It may also cause pseudotumor cerebri and hyperostosis. The patient will report any such changes in mood, depressive symptoms or suicidal thoughts, headaches, joint or bone pains. There is also a possible association with inflammatory bowel disease, although this is unproven at this point.     Again, thank you for allowing me to participate in the care of your patient.        Sincerely,        Yasmine Moreno PA-C

## 2018-09-06 ENCOUNTER — OFFICE VISIT (OUTPATIENT)
Dept: DERMATOLOGY | Facility: CLINIC | Age: 23
End: 2018-09-06
Payer: COMMERCIAL

## 2018-09-06 VITALS
DIASTOLIC BLOOD PRESSURE: 73 MMHG | WEIGHT: 210 LBS | HEART RATE: 71 BPM | SYSTOLIC BLOOD PRESSURE: 110 MMHG | BODY MASS INDEX: 39.36 KG/M2 | TEMPERATURE: 97.9 F

## 2018-09-06 DIAGNOSIS — L70.0 ACNE VULGARIS: Primary | ICD-10-CM

## 2018-09-06 DIAGNOSIS — L70.0 ACNE VULGARIS: ICD-10-CM

## 2018-09-06 LAB
ALBUMIN SERPL-MCNC: 3.8 G/DL (ref 3.4–5)
ALP SERPL-CCNC: 74 U/L (ref 40–150)
ALT SERPL W P-5'-P-CCNC: 44 U/L (ref 0–50)
ANION GAP SERPL CALCULATED.3IONS-SCNC: 7 MMOL/L (ref 3–14)
AST SERPL W P-5'-P-CCNC: 21 U/L (ref 0–45)
BETA HCG QUAL IFA URINE: NEGATIVE
BILIRUB SERPL-MCNC: 0.6 MG/DL (ref 0.2–1.3)
BUN SERPL-MCNC: 10 MG/DL (ref 7–30)
CALCIUM SERPL-MCNC: 9 MG/DL (ref 8.5–10.1)
CHLORIDE SERPL-SCNC: 105 MMOL/L (ref 94–109)
CHOLEST SERPL-MCNC: 217 MG/DL
CO2 SERPL-SCNC: 25 MMOL/L (ref 20–32)
CREAT SERPL-MCNC: 0.57 MG/DL (ref 0.52–1.04)
ERYTHROCYTE [DISTWIDTH] IN BLOOD BY AUTOMATED COUNT: 13.1 % (ref 10–15)
GFR SERPL CREATININE-BSD FRML MDRD: >90 ML/MIN/1.7M2
GLUCOSE SERPL-MCNC: 126 MG/DL (ref 70–99)
HCT VFR BLD AUTO: 39.9 % (ref 35–47)
HDLC SERPL-MCNC: 33 MG/DL
HGB BLD-MCNC: 13.6 G/DL (ref 11.7–15.7)
LDLC SERPL CALC-MCNC: 163 MG/DL
MCH RBC QN AUTO: 29.2 PG (ref 26.5–33)
MCHC RBC AUTO-ENTMCNC: 34.1 G/DL (ref 31.5–36.5)
MCV RBC AUTO: 86 FL (ref 78–100)
NONHDLC SERPL-MCNC: 184 MG/DL
PLATELET # BLD AUTO: 336 10E9/L (ref 150–450)
POTASSIUM SERPL-SCNC: 4 MMOL/L (ref 3.4–5.3)
PROT SERPL-MCNC: 8.3 G/DL (ref 6.8–8.8)
RBC # BLD AUTO: 4.65 10E12/L (ref 3.8–5.2)
SODIUM SERPL-SCNC: 137 MMOL/L (ref 133–144)
TRIGL SERPL-MCNC: 104 MG/DL
WBC # BLD AUTO: 10 10E9/L (ref 4–11)

## 2018-09-06 PROCEDURE — 36415 COLL VENOUS BLD VENIPUNCTURE: CPT | Performed by: PHYSICIAN ASSISTANT

## 2018-09-06 PROCEDURE — 84703 CHORIONIC GONADOTROPIN ASSAY: CPT | Performed by: PHYSICIAN ASSISTANT

## 2018-09-06 PROCEDURE — 85027 COMPLETE CBC AUTOMATED: CPT | Performed by: PHYSICIAN ASSISTANT

## 2018-09-06 PROCEDURE — 80053 COMPREHEN METABOLIC PANEL: CPT | Performed by: PHYSICIAN ASSISTANT

## 2018-09-06 PROCEDURE — 80061 LIPID PANEL: CPT | Performed by: PHYSICIAN ASSISTANT

## 2018-09-06 PROCEDURE — 99213 OFFICE O/P EST LOW 20 MIN: CPT | Performed by: PHYSICIAN ASSISTANT

## 2018-09-06 RX ORDER — ISOTRETINOIN 30 MG/1
1 CAPSULE ORAL 2 TIMES DAILY WITH MEALS
Qty: 60 CAPSULE | Refills: 0 | Status: SHIPPED | OUTPATIENT
Start: 2018-09-06 | End: 2018-10-04

## 2018-09-06 NOTE — MR AVS SNAPSHOT
After Visit Summary   9/6/2018    Chloe Obando    MRN: [id number]           Patient Information     Date Of Birth          1995        Visit Information        Provider Department      9/6/2018 7:00 AM Yasmine Lyman PA-C [facility]        Today's Diagnoses     Acne vulgaris    -  1       Follow-ups after your visit        Your next 10 appointments already scheduled     Oct 04, 2018  7:00 AM CDT   Return Visit with Yasmine Lyman PA-C   [facility] ([facility])    [address] [address]   [phone]              Future tests that were ordered for you today     Open Future Orders        Priority Expected Expires Ordered    Hepatic panel Routine 9/20/2018 9/6/2019 9/6/2018            Who to contact     If you have questions or need follow up information about today's clinic visit or your schedule please contact [facility] directly at [phone].  Normal or non-critical lab and imaging results will be communicated to you by Learn with Homerhart, letter or phone within 4 business days after the clinic has received the results. If you do not hear from us within 7 days, please contact the clinic through Gyftt or phone. If you have a critical or abnormal lab result, we will notify you by phone as soon as possible.  Submit refill requests through X[employer] or call your pharmacy and they will forward the refill request to us. Please allow 3 business days for your refill to be completed.          Additional Information About Your Visit        MyChart Information     [employer] gives you secure access to your electronic health record. If you see a primary care provider, you can also send messages to your care team and make appointments. If you have questions, please call your primary care clinic.  If you do not have a primary care provider, please call [phone] and they will assist you.        Care EveryWhere ID     This  is your Care EveryWhere ID. This could be used by other organizations to access your [facility] medical records  [id number]        Your Vitals Were     Pulse Temperature BMI (Body Mass Index)             71 97.9  F (36.6  C) (Tympanic) 39.36 kg/m2          Blood Pressure from Last 3 Encounters:   09/06/18 110/73   08/09/18 94/68   07/12/18 107/69    Weight from Last 3 Encounters:   09/06/18 95.3 kg (210 lb)   06/01/18 97.6 kg (215 lb 3.2 oz)   03/02/18 97.5 kg (215 lb)                 Today's Medication Changes          These changes are accurate as of 9/6/18  7:21 AM.  If you have any questions, ask your nurse or doctor.               These medicines have changed or have updated prescriptions.        Dose/Directions    * ISOtretinoin 40 MG capsule   Commonly known as:  ACCUTANE   This may have changed:  Another medication with the same name was added. Make sure you understand how and when to take each.   Used for:  Acne vulgaris   Changed by:  Yasmine Lyman PA-C        Dose:  40 mg   Take 1 capsule (40 mg) by mouth daily with food   Quantity:  30 capsule   Refills:  0       * ISOtretinoin 30 MG Caps   This may have changed:  You were already taking a medication with the same name, and this prescription was added. Make sure you understand how and when to take each.   Used for:  Acne vulgaris   Changed by:  Yasmine Lyman PA-C        Dose:  1 capsule   Take 1 capsule by mouth 2 times daily (with meals)   Quantity:  60 capsule   Refills:  0       * Notice:  This list has 2 medication(s) that are the same as other medications prescribed for you. Read the directions carefully, and ask your doctor or other care provider to review them with you.         Where to get your medicines      These medications were sent to [facility] [place], [place] - [address]  [address] [address]     Phone:  [phone]     ISOtretinoin 30 MG Caps                Primary Care Provider  Office Phone # Fax #    Mervat Schroeder,  [phone] [address] [place] [address]        Equal Access to Services     SUSHIL MUHAMMAD : Hadii aad ku hadcarmencitadavy Giovannajericho, waningda luqadaha, qaybta kaalmada chuck, gus day laBonniesuzan gomez. So [facility] [phone].    ATENCIÓN: Si habla español, tiene a gutierrez disposición servicios gratuitos de asistencia lingüística. Llame al [phone].    We comply with applicable federal civil rights laws and [place] laws. We do not discriminate on the basis of race, color, national origin, age, disability, sex, sexual orientation, or gender identity.            Thank you!     Thank you for choosing [facility]  for your care. Our goal is always to provide you with excellent care. Hearing back from our patients is one way we can continue to improve our services. Please take a few minutes to complete the written survey that you may receive in the mail after your visit with us. Thank you!             Your Updated Medication List - Protect others around you: Learn how to safely use, store and throw away your medicines at www.disposemymeds.org.          This list is accurate as of 9/6/18  7:21 AM.  Always use your most recent med list.                   Brand Name Dispense Instructions for use Diagnosis    * ISOtretinoin 40 MG capsule    ACCUTANE    30 capsule    Take 1 capsule (40 mg) by mouth daily with food    Acne vulgaris       * ISOtretinoin 30 MG Caps     60 capsule    Take 1 capsule by mouth 2 times daily (with meals)    Acne vulgaris       metFORMIN 850 MG tablet    GLUCOPHAGE    180 tablet    Take 1 tablet (850 mg) by mouth 2 times daily (with meals)    Amenorrhea, secondary       norethindrone-ethinyl estradiol 1-35 MG-MCG per tablet    ORTHO-NOVUM 1-35 TAB,NORTREL 1-35 TAB    84 tablet    Take 1 tablet by mouth daily    Absence of menstruation       sertraline 100 MG tablet    ZOLOFT    30 tablet    Take 1 tablet (100  mg) by mouth daily    Generalized anxiety disorder, Moderate episode of recurrent major depressive disorder (H)       * Notice:  This list has 2 medication(s) that are the same as other medications prescribed for you. Read the directions carefully, and ask your doctor or other care provider to review them with you.

## 2018-09-06 NOTE — NURSING NOTE
"Initial /73 (BP Location: Right arm, Patient Position: Sitting, Cuff Size: Adult Large)  Pulse 71  Temp 97.9  F (36.6  C) (Tympanic)  Wt 95.3 kg (210 lb)  BMI 39.36 kg/m2 Estimated body mass index is 39.36 kg/(m^2) as calculated from the following:    Height as of 6/1/18: 1.556 m (5' 1.25\").    Weight as of this encounter: 95.3 kg (210 lb). .    Frankie TYSON CMA    "

## 2018-09-06 NOTE — PROGRESS NOTES
Chloe Obando is a 23 year old year old female patient here today for recheck acne vulgaris. Patient finished her 6th month of 40 mg daily with food. LFTs have been slightly elevated, this was even prior to starting isotretinoin. She reports dryness which has improved this past month. She denies any other side effects, no mood changes. This is her second round of isotretinoin.  Patient has no other skin complaints today.  Remainder of the HPI, Meds, PMH, Allergies, FH, and SH was reviewed in chart.    Pertinent Hx:   Acne Vulgaris   Past Medical History:   Diagnosis Date     Closed fracture of unspecified part of humerus     Left Humerus Fracture     Hematuria as child     Urinary tract infection, site not specified as child    nl renal U/S, no VCUG       Past Surgical History:   Procedure Laterality Date     TONSILLECTOMY ADULT Bilateral 3/15/2017    Procedure: TONSILLECTOMY ADULT;  Surgeon: Kamini Valdovinos MD;  Location: [facility] OR        Family History   Problem Relation Age of Onset     Diabetes Mother      gestational diabetes (history of)     Family History Negative Father      C.A.D. Maternal Grandmother      Quad. Bypass at age 74     Diabetes Maternal Grandmother      Asthma Maternal Grandmother      Lipids Maternal Grandmother      Arthritis Maternal Grandfather      Hypertension Paternal Grandmother      Lipids Paternal Grandmother      cholesterol     Other Cancer Paternal Grandmother      lung cancer     Family History Negative Paternal Grandfather        Social History     Social History     Marital status: Single     Spouse name: N/A     Number of children: N/A     Years of education: N/A     Occupational History     Not on file.     Social History Main Topics     Smoking status: Never Smoker     Smokeless tobacco: Never Used      Comment: parents don't smoke in the house     Alcohol use No     Drug use: No     Sexual activity: Yes     Partners: Male     Birth control/ protection: Condom, Pill     Other  Topics Concern     Parent/Sibling W/ Cabg, Mi Or Angioplasty Before 65f 55m? No     Social History Narrative       Outpatient Encounter Prescriptions as of 9/6/2018   Medication Sig Dispense Refill     ISOtretinoin (ACCUTANE) 40 MG capsule Take 1 capsule (40 mg) by mouth daily with food 30 capsule 0     ISOtretinoin 30 MG CAPS Take 1 capsule by mouth 2 times daily (with meals) 60 capsule 0     metFORMIN (GLUCOPHAGE) 850 MG tablet Take 1 tablet (850 mg) by mouth 2 times daily (with meals) 180 tablet 3     norethindrone-ethinyl estradiol (ORTHO-NOVUM 1-35 TAB,NORTREL 1-35 TAB) 1-35 MG-MCG per tablet Take 1 tablet by mouth daily 84 tablet 3     sertraline (ZOLOFT) 100 MG tablet Take 1 tablet (100 mg) by mouth daily 30 tablet 0     No facility-administered encounter medications on file as of 9/6/2018.              Review Of Systems  Skin: As above  Eyes: negative  Ears/Nose/Throat: negative  Respiratory: No shortness of breath, dyspnea on exertion, cough, or hemoptysis  Cardiovascular: negative  Gastrointestinal: negative  Genitourinary: negative  Musculoskeletal: negative  Neurologic: negative  Psychiatric: negative  Hematologic/Lymphatic/Immunologic: negative  Endocrine: negative      O:   NAD, WDWN, Alert & Oriented, Mood & Affect wnl, Vitals stable   Here today alone   /73 (BP Location: Right arm, Patient Position: Sitting, Cuff Size: Adult Large)  Pulse 71  Temp 97.9  F (36.6  C) (Tympanic)  Wt 95.3 kg (210 lb)  BMI 39.36 kg/m2   General appearance normal   Vitals stable   Alert, oriented and in no acute distress     No visible inflammatory papules      Eyes: Conjunctivae/lids:Normal     ENT: Lips: normal    MSK:Normal    Cardiovascular: peripheral edema none    Pulm: Breathing Normal     Neuro/Psych: Orientation:Normal; Mood/Affect:Normal  A/P:  1. Acne Vulgaris  - doing great. No side effects.   Increase to 30 mg twice daily. Recheck liver test in two weeks after increasing dose.  Patient is aware of  depression side effects and she will make us or PCP aware if mood changes. She notes that it is well controlled and did not have any issues with mood in the past with isotretinoin.   Standing hcg, CBC, CMP and fasting lipids  Ipledge reviewed with patient and Ipledge consent form complete  Patient place in ipledge system  Contraception: 1. Birth control pills 2. Condoms   Current Dosage: 7200 mg   Goal Dosage: 11,727 mg (120 mg/kg)  Ipledge: [id number]  Return to clinic 30 days  Dry lips and mouth, minor swelling of the eyelids or lips, crusty skin, nosebleeds, GI upset, or thinning of hair may occur. If any of these effects persist or worsen, tell your doctor or pharmacist promptly.   To relieve dry mouth, suck on (sugarless) hard candy or ice chips, chew (sugarless) gum, drink water.   Remember that your doctor has prescribed this medication because he or she has judged that the benefit to you is greater than the risk of side effects. Many people using this medication do not have serious side effects.   Contact office immediately if you have any of these unlikely but serious side effects: mental/mood changes (e.g., depression,  aggressive or violent behavior, and in rare cases, thoughts of suicide), tingling feeling in the skin, quick/severe sun sensitivity, back/joint/muscle pain, signs of infection (e.g., fever, persistent sore throat, painful swallowing, peeling skin on palms/soles.   Isotretinoin may infrequently cause disease of the pancreatitis, that may rarely be fatal. Stop taking this medication and contact office immediately if you develop: severe stomach pain severe or persistent GI upset,   Stop taking this medication and tell your doctor immediately if you develop these unlikely but very serious side effects: severe headache, vision changes, ear ringing, hearling loss, chest pain, yellowing eyes, skin, dark urine, severe diarrhea, rectal bleeding,   Seek immediate medical attention if you notice any  symptoms of a serious allergic reaction.      Accutane is discussed fully with the patient. It is a very effective drug to treat acne vulgaris but has many potential significant side effects. Chief among these are teratogensis, hepatic injury, dyslipidemia and severe drying of the mucous membranes. All of these issues have been discussed in details. Monthly blood tests to monitor lipids and liver functions will be necessary. Expect painful dryness and/or fissuring around the lips, eyes, and other moist areas of the body. Balms may be protective. Contact lens may be too painful to wear temporarily while on this drug. Episodes of significant depression have been reported, including suicidal ideation and attempts in rare cases. It may also cause pseudotumor cerebri and hyperostosis. The patient will report any such changes in mood, depressive symptoms or suicidal thoughts, headaches, joint or bone pains. There is also a possible association with inflammatory bowel disease, although this is unproven at this point.

## 2018-09-06 NOTE — LETTER
9/6/2018         RE: Chloe Obando  [address] [address]        Dear Colleague,    Thank you for referring your patient, Chloe Obando, to the [facility]. Please see a copy of my visit note below.    Chloe Obando is a 23 year old year old female patient here today for recheck acne vulgaris. Patient finished her 6th month of 40 mg daily with food. LFTs have been slightly elevated, this was even prior to starting isotretinoin. She reports dryness which has improved this past month. She denies any other side effects, no mood changes. This is her second round of isotretinoin.  Patient has no other skin complaints today.  Remainder of the HPI, Meds, PMH, Allergies, FH, and SH was reviewed in chart.    Pertinent Hx:   Acne Vulgaris   Past Medical History:   Diagnosis Date     Closed fracture of unspecified part of humerus     Left Humerus Fracture     Hematuria as child     Urinary tract infection, site not specified as child    nl renal U/S, no VCUG       Past Surgical History:   Procedure Laterality Date     TONSILLECTOMY ADULT Bilateral 3/15/2017    Procedure: TONSILLECTOMY ADULT;  Surgeon: Kamini Valdovinos MD;  Location: [facility] OR        Family History   Problem Relation Age of Onset     Diabetes Mother      gestational diabetes (history of)     Family History Negative Father      C.A.D. Maternal Grandmother      Quad. Bypass at age 74     Diabetes Maternal Grandmother      Asthma Maternal Grandmother      Lipids Maternal Grandmother      Arthritis Maternal Grandfather      Hypertension Paternal Grandmother      Lipids Paternal Grandmother      cholesterol     Other Cancer Paternal Grandmother      lung cancer     Family History Negative Paternal Grandfather        Social History     Social History     Marital status: Single     Spouse name: N/A     Number of children: N/A     Years of education: N/A     Occupational History     Not on file.     Social History Main Topics     Smoking  status: Never Smoker     Smokeless tobacco: Never Used      Comment: parents don't smoke in the house     Alcohol use No     Drug use: No     Sexual activity: Yes     Partners: Male     Birth control/ protection: Condom, Pill     Other Topics Concern     Parent/Sibling W/ Cabg, Mi Or Angioplasty Before 65f 55m? No     Social History Narrative       Outpatient Encounter Prescriptions as of 9/6/2018   Medication Sig Dispense Refill     ISOtretinoin (ACCUTANE) 40 MG capsule Take 1 capsule (40 mg) by mouth daily with food 30 capsule 0     ISOtretinoin 30 MG CAPS Take 1 capsule by mouth 2 times daily (with meals) 60 capsule 0     metFORMIN (GLUCOPHAGE) 850 MG tablet Take 1 tablet (850 mg) by mouth 2 times daily (with meals) 180 tablet 3     norethindrone-ethinyl estradiol (ORTHO-NOVUM 1-35 TAB,NORTREL 1-35 TAB) 1-35 MG-MCG per tablet Take 1 tablet by mouth daily 84 tablet 3     sertraline (ZOLOFT) 100 MG tablet Take 1 tablet (100 mg) by mouth daily 30 tablet 0     No facility-administered encounter medications on file as of 9/6/2018.              Review Of Systems  Skin: As above  Eyes: negative  Ears/Nose/Throat: negative  Respiratory: No shortness of breath, dyspnea on exertion, cough, or hemoptysis  Cardiovascular: negative  Gastrointestinal: negative  Genitourinary: negative  Musculoskeletal: negative  Neurologic: negative  Psychiatric: negative  Hematologic/Lymphatic/Immunologic: negative  Endocrine: negative      O:   NAD, WDWN, Alert & Oriented, Mood & Affect wnl, Vitals stable   Here today alone   /73 (BP Location: Right arm, Patient Position: Sitting, Cuff Size: Adult Large)  Pulse 71  Temp 97.9  F (36.6  C) (Tympanic)  Wt 95.3 kg (210 lb)  BMI 39.36 kg/m2   General appearance normal   Vitals stable   Alert, oriented and in no acute distress     No visible inflammatory papules      Eyes: Conjunctivae/lids:Normal     ENT: Lips: normal    MSK:Normal    Cardiovascular: peripheral edema none    Pulm:  Breathing Normal     Neuro/Psych: Orientation:Normal; Mood/Affect:Normal  A/P:  1. Acne Vulgaris  - doing great. No side effects.   Increase to 30 mg twice daily. Recheck liver test in two weeks after increasing dose.  Patient is aware of depression side effects and she will make us or PCP aware if mood changes. She notes that it is well controlled and did not have any issues with mood in the past with isotretinoin.   Standing hcg, CBC, CMP and fasting lipids  Ipledge reviewed with patient and Ipledge consent form complete  Patient place in ipledge system  Contraception: 1. Birth control pills 2. Condoms   Current Dosage: 7200 mg   Goal Dosage: 11,727 mg (120 mg/kg)  Ipledge: [id number]  Return to clinic 30 days  Dry lips and mouth, minor swelling of the eyelids or lips, crusty skin, nosebleeds, GI upset, or thinning of hair may occur. If any of these effects persist or worsen, tell your doctor or pharmacist promptly.   To relieve dry mouth, suck on (sugarless) hard candy or ice chips, chew (sugarless) gum, drink water.   Remember that your doctor has prescribed this medication because he or she has judged that the benefit to you is greater than the risk of side effects. Many people using this medication do not have serious side effects.   Contact office immediately if you have any of these unlikely but serious side effects: mental/mood changes (e.g., depression,  aggressive or violent behavior, and in rare cases, thoughts of suicide), tingling feeling in the skin, quick/severe sun sensitivity, back/joint/muscle pain, signs of infection (e.g., fever, persistent sore throat, painful swallowing, peeling skin on palms/soles.   Isotretinoin may infrequently cause disease of the pancreatitis, that may rarely be fatal. Stop taking this medication and contact office immediately if you develop: severe stomach pain severe or persistent GI upset,   Stop taking this medication and tell your doctor immediately if you develop  these unlikely but very serious side effects: severe headache, vision changes, ear ringing, hearling loss, chest pain, yellowing eyes, skin, dark urine, severe diarrhea, rectal bleeding,   Seek immediate medical attention if you notice any symptoms of a serious allergic reaction.      Accutane is discussed fully with the patient. It is a very effective drug to treat acne vulgaris but has many potential significant side effects. Chief among these are teratogensis, hepatic injury, dyslipidemia and severe drying of the mucous membranes. All of these issues have been discussed in details. Monthly blood tests to monitor lipids and liver functions will be necessary. Expect painful dryness and/or fissuring around the lips, eyes, and other moist areas of the body. Balms may be protective. Contact lens may be too painful to wear temporarily while on this drug. Episodes of significant depression have been reported, including suicidal ideation and attempts in rare cases. It may also cause pseudotumor cerebri and hyperostosis. The patient will report any such changes in mood, depressive symptoms or suicidal thoughts, headaches, joint or bone pains. There is also a possible association with inflammatory bowel disease, although this is unproven at this point.     Again, thank you for allowing me to participate in the care of your patient.        Sincerely,        Yasmine Moreno PA-C

## 2018-09-19 ENCOUNTER — FCC EXTENDED DOCUMENTATION (OUTPATIENT)
Dept: PSYCHOLOGY | Facility: CLINIC | Age: 23
End: 2018-09-19

## 2018-09-19 NOTE — PROGRESS NOTES
Discharge Summary  Multiple Sessions    Client Name: Chloe Obando MRN#: [id number] YOB: 1995      Intake / Discharge Date: 12-17-15 and 9-22-16      DSM5 Diagnoses: (Sustained by DSM5 Criteria Listed Above)  Diagnoses: 296.22 Major Depressive Disorder, Single Episode, Moderate _ and With anxious distress  300.02 (F41.1) Generalized Anxiety Disorder  Psychosocial & Contextual Factors: Transferred schools and back living at home feeling some lack of support.         Presenting Concern:  Depression and anxiety       Reason for Discharge:  Client is satisfied with progress      Disposition at Time of Last Encounter:   Comments:   Client made progress on all goal areas      Risk Management:   Client denies a history of suicidal ideation, suicide attempts, self-injurious behavior, homicidal ideation, homicidal behavior and and other safety concerns  A safety and risk management plan has not been developed at this time, however client was given the after-hours number / 911 should there be a change in any of these risk factors.      Referred To:  Does not apply         Sherie Abarca,    9/19/2018

## 2018-10-04 ENCOUNTER — OFFICE VISIT (OUTPATIENT)
Dept: DERMATOLOGY | Facility: CLINIC | Age: 23
End: 2018-10-04
Payer: COMMERCIAL

## 2018-10-04 VITALS — HEART RATE: 69 BPM | OXYGEN SATURATION: 98 % | DIASTOLIC BLOOD PRESSURE: 69 MMHG | SYSTOLIC BLOOD PRESSURE: 118 MMHG

## 2018-10-04 DIAGNOSIS — L70.0 ACNE VULGARIS: ICD-10-CM

## 2018-10-04 LAB
ALBUMIN SERPL-MCNC: 3.7 G/DL (ref 3.4–5)
ALP SERPL-CCNC: 78 U/L (ref 40–150)
ALT SERPL W P-5'-P-CCNC: 44 U/L (ref 0–50)
ANION GAP SERPL CALCULATED.3IONS-SCNC: 10 MMOL/L (ref 3–14)
AST SERPL W P-5'-P-CCNC: 23 U/L (ref 0–45)
BETA HCG QUAL IFA URINE: NEGATIVE
BILIRUB DIRECT SERPL-MCNC: 0.1 MG/DL (ref 0–0.2)
BILIRUB SERPL-MCNC: 0.5 MG/DL (ref 0.2–1.3)
BUN SERPL-MCNC: 10 MG/DL (ref 7–30)
CALCIUM SERPL-MCNC: 9 MG/DL (ref 8.5–10.1)
CHLORIDE SERPL-SCNC: 103 MMOL/L (ref 94–109)
CHOLEST SERPL-MCNC: 195 MG/DL
CO2 SERPL-SCNC: 23 MMOL/L (ref 20–32)
CREAT SERPL-MCNC: 0.52 MG/DL (ref 0.52–1.04)
ERYTHROCYTE [DISTWIDTH] IN BLOOD BY AUTOMATED COUNT: 13.2 % (ref 10–15)
GFR SERPL CREATININE-BSD FRML MDRD: >90 ML/MIN/1.7M2
GLUCOSE SERPL-MCNC: 135 MG/DL (ref 70–99)
HCT VFR BLD AUTO: 40.2 % (ref 35–47)
HDLC SERPL-MCNC: 32 MG/DL
HGB BLD-MCNC: 13.6 G/DL (ref 11.7–15.7)
LDLC SERPL CALC-MCNC: 142 MG/DL
MCH RBC QN AUTO: 29.3 PG (ref 26.5–33)
MCHC RBC AUTO-ENTMCNC: 33.8 G/DL (ref 31.5–36.5)
MCV RBC AUTO: 87 FL (ref 78–100)
NONHDLC SERPL-MCNC: 163 MG/DL
PLATELET # BLD AUTO: 323 10E9/L (ref 150–450)
POTASSIUM SERPL-SCNC: 4 MMOL/L (ref 3.4–5.3)
PROT SERPL-MCNC: 8.3 G/DL (ref 6.8–8.8)
RBC # BLD AUTO: 4.64 10E12/L (ref 3.8–5.2)
SODIUM SERPL-SCNC: 136 MMOL/L (ref 133–144)
TRIGL SERPL-MCNC: 106 MG/DL
WBC # BLD AUTO: 10.2 10E9/L (ref 4–11)

## 2018-10-04 PROCEDURE — 80061 LIPID PANEL: CPT | Performed by: PHYSICIAN ASSISTANT

## 2018-10-04 PROCEDURE — 80053 COMPREHEN METABOLIC PANEL: CPT | Performed by: PHYSICIAN ASSISTANT

## 2018-10-04 PROCEDURE — 85027 COMPLETE CBC AUTOMATED: CPT | Performed by: PHYSICIAN ASSISTANT

## 2018-10-04 PROCEDURE — 82248 BILIRUBIN DIRECT: CPT | Performed by: PHYSICIAN ASSISTANT

## 2018-10-04 PROCEDURE — 99213 OFFICE O/P EST LOW 20 MIN: CPT | Performed by: PHYSICIAN ASSISTANT

## 2018-10-04 PROCEDURE — 36415 COLL VENOUS BLD VENIPUNCTURE: CPT | Performed by: PHYSICIAN ASSISTANT

## 2018-10-04 PROCEDURE — 84703 CHORIONIC GONADOTROPIN ASSAY: CPT | Performed by: PHYSICIAN ASSISTANT

## 2018-10-04 RX ORDER — ISOTRETINOIN 30 MG/1
1 CAPSULE ORAL 2 TIMES DAILY WITH MEALS
Qty: 60 CAPSULE | Refills: 0 | Status: SHIPPED | OUTPATIENT
Start: 2018-10-04 | End: 2018-11-08

## 2018-10-04 NOTE — PROGRESS NOTES
Chloe Obando is a 23 year old year old female patient here today for recheck acne vulgaris. Patient finished her 7th month, now taking 30 mg twice daily with food. LFTs have been slightly elevated, this was even prior to starting isotretinoin. She reports dryness which has improved this past month. She denies any other side effects, no mood changes. This is her second round of isotretinoin. Patient has no other skin complaints today.  Remainder of the HPI, Meds, PMH, Allergies, FH, and SH was reviewed in chart.    Pertinent Hx:   Acne Vulgaris   Past Medical History:   Diagnosis Date     Closed fracture of unspecified part of humerus     Left Humerus Fracture     Hematuria as child     Urinary tract infection, site not specified as child    nl renal U/S, no VCUG       Past Surgical History:   Procedure Laterality Date     TONSILLECTOMY ADULT Bilateral 3/15/2017    Procedure: TONSILLECTOMY ADULT;  Surgeon: Kamini Valdovinos MD;  Location: [facility]        Family History   Problem Relation Age of Onset     Diabetes Mother      gestational diabetes (history of)     Family History Negative Father      C.A.D. Maternal Grandmother      Quad. Bypass at age 74     Diabetes Maternal Grandmother      Asthma Maternal Grandmother      Lipids Maternal Grandmother      Arthritis Maternal Grandfather      Hypertension Paternal Grandmother      Lipids Paternal Grandmother      cholesterol     Other Cancer Paternal Grandmother      lung cancer     Family History Negative Paternal Grandfather        Social History     Social History     Marital status: Single     Spouse name: N/A     Number of children: N/A     Years of education: N/A     Occupational History     Not on file.     Social History Main Topics     Smoking status: Never Smoker     Smokeless tobacco: Never Used      Comment: parents don't smoke in the house     Alcohol use No     Drug use: No     Sexual activity: Yes     Partners: Male     Birth control/ protection: Condom, Pill      Other Topics Concern     Parent/Sibling W/ Cabg, Mi Or Angioplasty Before 65f 55m? No     Social History Narrative       Outpatient Encounter Prescriptions as of 10/4/2018   Medication Sig Dispense Refill     ISOtretinoin 30 MG CAPS Take 1 capsule by mouth 2 times daily (with meals) 60 capsule 0     metFORMIN (GLUCOPHAGE) 850 MG tablet Take 1 tablet (850 mg) by mouth 2 times daily (with meals) 180 tablet 3     norethindrone-ethinyl estradiol (ORTHO-NOVUM 1-35 TAB,NORTREL 1-35 TAB) 1-35 MG-MCG per tablet Take 1 tablet by mouth daily 84 tablet 3     sertraline (ZOLOFT) 100 MG tablet Take 1 tablet (100 mg) by mouth daily 30 tablet 0     ISOtretinoin (ACCUTANE) 40 MG capsule Take 1 capsule (40 mg) by mouth daily with food (Patient not taking: Reported on 10/4/2018) 30 capsule 0     [DISCONTINUED] ISOtretinoin 30 MG CAPS Take 1 capsule by mouth 2 times daily (with meals) 60 capsule 0     No facility-administered encounter medications on file as of 10/4/2018.              Review Of Systems  Skin: As above  Eyes: negative  Ears/Nose/Throat: negative  Respiratory: No shortness of breath, dyspnea on exertion, cough, or hemoptysis  Cardiovascular: negative  Gastrointestinal: negative  Genitourinary: negative  Musculoskeletal: negative  Neurologic: negative  Psychiatric: negative  Hematologic/Lymphatic/Immunologic: negative  Endocrine: negative      O:   NAD, WDWN, Alert & Oriented, Mood & Affect wnl, Vitals stable   Here today alone   /69  Pulse 69  SpO2 98%   General appearance normal   Vitals stable   Alert, oriented and in no acute distress     Face is clear       Eyes: Conjunctivae/lids:Normal     ENT: Lips: normal    MSK:Normal    Pulm: Breathing Normal    Neuro/Psych: Orientation:Normal; Mood/Affect:Normal  A/P:  1. Acne Vulgaris  - doing great. No side effects.   Continue 30 mg twice daily. Recheck liver test in two weeks after increasing dose.  Patient is aware of depression side effects and she will make  us or PCP aware if mood changes. She notes that it is well controlled and did not have any issues with mood in the past with isotretinoin.   Standing hcg, CBC, CMP and fasting lipids  Ipledge reviewed with patient and Ipledge consent form complete  Patient place in ipledge system  Contraception: 1. Birth control pills 2. Condoms   Current Dosage: 9000 mg   Goal Dosage: 11,727 mg (120 mg/kg)  Ipledge: [id number]  Return to clinic 30 days  Dry lips and mouth, minor swelling of the eyelids or lips, crusty skin, nosebleeds, GI upset, or thinning of hair may occur. If any of these effects persist or worsen, tell your doctor or pharmacist promptly.   To relieve dry mouth, suck on (sugarless) hard candy or ice chips, chew (sugarless) gum, drink water.   Remember that your doctor has prescribed this medication because he or she has judged that the benefit to you is greater than the risk of side effects. Many people using this medication do not have serious side effects.   Contact office immediately if you have any of these unlikely but serious side effects: mental/mood changes (e.g., depression,  aggressive or violent behavior, and in rare cases, thoughts of suicide), tingling feeling in the skin, quick/severe sun sensitivity, back/joint/muscle pain, signs of infection (e.g., fever, persistent sore throat, painful swallowing, peeling skin on palms/soles.   Isotretinoin may infrequently cause disease of the pancreatitis, that may rarely be fatal. Stop taking this medication and contact office immediately if you develop: severe stomach pain severe or persistent GI upset,   Stop taking this medication and tell your doctor immediately if you develop these unlikely but very serious side effects: severe headache, vision changes, ear ringing, hearling loss, chest pain, yellowing eyes, skin, dark urine, severe diarrhea, rectal bleeding,   Seek immediate medical attention if you notice any symptoms of a serious allergic  reaction.      Accutane is discussed fully with the patient. It is a very effective drug to treat acne vulgaris but has many potential significant side effects. Chief among these are teratogensis, hepatic injury, dyslipidemia and severe drying of the mucous membranes. All of these issues have been discussed in details. Monthly blood tests to monitor lipids and liver functions will be necessary. Expect painful dryness and/or fissuring around the lips, eyes, and other moist areas of the body. Balms may be protective. Contact lens may be too painful to wear temporarily while on this drug. Episodes of significant depression have been reported, including suicidal ideation and attempts in rare cases. It may also cause pseudotumor cerebri and hyperostosis. The patient will report any such changes in mood, depressive symptoms or suicidal thoughts, headaches, joint or bone pains. There is also a possible association with inflammatory bowel disease, although this is unproven at this point.

## 2018-10-04 NOTE — LETTER
10/4/2018         RE: Chloe Obando  [address] [address]        Dear Colleague,    Thank you for referring your patient, Chloe Obando, to the [facility]. Please see a copy of my visit note below.    Chloe Obando is a 23 year old year old female patient here today for recheck acne vulgaris. Patient finished her 7th month, now taking 30 mg twice daily with food. LFTs have been slightly elevated, this was even prior to starting isotretinoin. She reports dryness which has improved this past month. She denies any other side effects, no mood changes. This is her second round of isotretinoin. Patient has no other skin complaints today.  Remainder of the HPI, Meds, PMH, Allergies, FH, and SH was reviewed in chart.    Pertinent Hx:   Acne Vulgaris   Past Medical History:   Diagnosis Date     Closed fracture of unspecified part of humerus     Left Humerus Fracture     Hematuria as child     Urinary tract infection, site not specified as child    nl renal U/S, no VCUG       Past Surgical History:   Procedure Laterality Date     TONSILLECTOMY ADULT Bilateral 3/15/2017    Procedure: TONSILLECTOMY ADULT;  Surgeon: Kamini Valdovinos MD;  Location: [facility]        Family History   Problem Relation Age of Onset     Diabetes Mother      gestational diabetes (history of)     Family History Negative Father      C.A.D. Maternal Grandmother      Quad. Bypass at age 74     Diabetes Maternal Grandmother      Asthma Maternal Grandmother      Lipids Maternal Grandmother      Arthritis Maternal Grandfather      Hypertension Paternal Grandmother      Lipids Paternal Grandmother      cholesterol     Other Cancer Paternal Grandmother      lung cancer     Family History Negative Paternal Grandfather        Social History     Social History     Marital status: Single     Spouse name: N/A     Number of children: N/A     Years of education: N/A     Occupational History     Not on file.     Social History Main  Topics     Smoking status: Never Smoker     Smokeless tobacco: Never Used      Comment: parents don't smoke in the house     Alcohol use No     Drug use: No     Sexual activity: Yes     Partners: Male     Birth control/ protection: Condom, Pill     Other Topics Concern     Parent/Sibling W/ Cabg, Mi Or Angioplasty Before 65f 55m? No     Social History Narrative       Outpatient Encounter Prescriptions as of 10/4/2018   Medication Sig Dispense Refill     ISOtretinoin 30 MG CAPS Take 1 capsule by mouth 2 times daily (with meals) 60 capsule 0     metFORMIN (GLUCOPHAGE) 850 MG tablet Take 1 tablet (850 mg) by mouth 2 times daily (with meals) 180 tablet 3     norethindrone-ethinyl estradiol (ORTHO-NOVUM 1-35 TAB,NORTREL 1-35 TAB) 1-35 MG-MCG per tablet Take 1 tablet by mouth daily 84 tablet 3     sertraline (ZOLOFT) 100 MG tablet Take 1 tablet (100 mg) by mouth daily 30 tablet 0     ISOtretinoin (ACCUTANE) 40 MG capsule Take 1 capsule (40 mg) by mouth daily with food (Patient not taking: Reported on 10/4/2018) 30 capsule 0     [DISCONTINUED] ISOtretinoin 30 MG CAPS Take 1 capsule by mouth 2 times daily (with meals) 60 capsule 0     No facility-administered encounter medications on file as of 10/4/2018.              Review Of Systems  Skin: As above  Eyes: negative  Ears/Nose/Throat: negative  Respiratory: No shortness of breath, dyspnea on exertion, cough, or hemoptysis  Cardiovascular: negative  Gastrointestinal: negative  Genitourinary: negative  Musculoskeletal: negative  Neurologic: negative  Psychiatric: negative  Hematologic/Lymphatic/Immunologic: negative  Endocrine: negative      O:   NAD, WDWN, Alert & Oriented, Mood & Affect wnl, Vitals stable   Here today alone   /69  Pulse 69  SpO2 98%   General appearance normal   Vitals stable   Alert, oriented and in no acute distress     Face is clear       Eyes: Conjunctivae/lids:Normal     ENT: Lips: normal    MSK:Normal    Pulm: Breathing Normal    Neuro/Psych:  Orientation:Normal; Mood/Affect:Normal  A/P:  1. Acne Vulgaris  - doing great. No side effects.   Continue 30 mg twice daily. Recheck liver test in two weeks after increasing dose.  Patient is aware of depression side effects and she will make us or PCP aware if mood changes. She notes that it is well controlled and did not have any issues with mood in the past with isotretinoin.   Standing hcg, CBC, CMP and fasting lipids  Ipledge reviewed with patient and Ipledge consent form complete  Patient place in ipledge system  Contraception: 1. Birth control pills 2. Condoms   Current Dosage: 9000 mg   Goal Dosage: 11,727 mg (120 mg/kg)  Ipledge: [id number]  Return to clinic 30 days  Dry lips and mouth, minor swelling of the eyelids or lips, crusty skin, nosebleeds, GI upset, or thinning of hair may occur. If any of these effects persist or worsen, tell your doctor or pharmacist promptly.   To relieve dry mouth, suck on (sugarless) hard candy or ice chips, chew (sugarless) gum, drink water.   Remember that your doctor has prescribed this medication because he or she has judged that the benefit to you is greater than the risk of side effects. Many people using this medication do not have serious side effects.   Contact office immediately if you have any of these unlikely but serious side effects: mental/mood changes (e.g., depression,  aggressive or violent behavior, and in rare cases, thoughts of suicide), tingling feeling in the skin, quick/severe sun sensitivity, back/joint/muscle pain, signs of infection (e.g., fever, persistent sore throat, painful swallowing, peeling skin on palms/soles.   Isotretinoin may infrequently cause disease of the pancreatitis, that may rarely be fatal. Stop taking this medication and contact office immediately if you develop: severe stomach pain severe or persistent GI upset,   Stop taking this medication and tell your doctor immediately if you develop these unlikely but very serious side  effects: severe headache, vision changes, ear ringing, hearling loss, chest pain, yellowing eyes, skin, dark urine, severe diarrhea, rectal bleeding,   Seek immediate medical attention if you notice any symptoms of a serious allergic reaction.      Accutane is discussed fully with the patient. It is a very effective drug to treat acne vulgaris but has many potential significant side effects. Chief among these are teratogensis, hepatic injury, dyslipidemia and severe drying of the mucous membranes. All of these issues have been discussed in details. Monthly blood tests to monitor lipids and liver functions will be necessary. Expect painful dryness and/or fissuring around the lips, eyes, and other moist areas of the body. Balms may be protective. Contact lens may be too painful to wear temporarily while on this drug. Episodes of significant depression have been reported, including suicidal ideation and attempts in rare cases. It may also cause pseudotumor cerebri and hyperostosis. The patient will report any such changes in mood, depressive symptoms or suicidal thoughts, headaches, joint or bone pains. There is also a possible association with inflammatory bowel disease, although this is unproven at this point.     Again, thank you for allowing me to participate in the care of your patient.        Sincerely,        Yasmine Moreno PA-C

## 2018-10-04 NOTE — NURSING NOTE
Chief Complaint   Patient presents with     Accutane       Vitals:    10/04/18 0708   BP: 118/69   Pulse: 69   SpO2: 98%     Wt Readings from Last 1 Encounters:   09/06/18 95.3 kg (210 lb)     Piedad Malagon LPN.................10/4/2018

## 2018-10-04 NOTE — MR AVS SNAPSHOT
After Visit Summary   10/4/2018    Chloe Obando    MRN: [id number]           Patient Information     Date Of Birth          1995        Visit Information        Provider Department      10/4/2018 7:00 AM Yasmine Lyman PA-C [facility]        Today's Diagnoses     Acne vulgaris           Follow-ups after your visit        Your next 10 appointments already scheduled     Nov 06, 2018  2:10 PM CST   LAB with [facility] ([facility])    [address] [address]   [phone]           Please do not eat 10-12 hours before your appointment if you are coming in fasting for labs on lipids, cholesterol, or glucose (sugar). This does not apply to pregnant women. Water, hot tea and black coffee (with nothing added) are okay. Do not drink other fluids, diet soda or chew gum.            Nov 06, 2018  2:20 PM CST   Return Visit with Yasmine Lyman PA-C   [facility]    [address] [address]   [phone]            Dec 06, 2018  7:10 AM CST   LAB with [facility]   [facility] ([facility]    [address] [address]   [phone]           Please do not eat 10-12 hours before your appointment if you are coming in fasting for labs on lipids, cholesterol, or glucose (sugar). This does not apply to pregnant women. Water, hot tea and black coffee (with nothing added) are okay. Do not drink other fluids, diet soda or chew gum.            Dec 06, 2018  7:20 AM CST   Return Visit with Yasmine Lyman PA-C   [facility] ([facility]    [address] [address]   [phone]              Who to contact     If you have questions or need follow up information about today's clinic visit or your schedule please contact [facility] directly at  [phone].  Normal or non-critical lab and imaging results will be communicated to you by MyChart, letter or phone within 4 business days after the clinic has received the results. If you do not hear from us within 7 days, please contact the clinic through [employer] or phone. If you have a critical or abnormal lab result, we will notify you by phone as soon as possible.  Submit refill requests through [employer] or call your pharmacy and they will forward the refill request to us. Please allow 3 business days for your refill to be completed.          Additional Information About Your Visit        [employer] Information     [employer] gives you secure access to your electronic health record. If you see a primary care provider, you can also send messages to your care team and make appointments. If you have questions, please call your primary care clinic.  If you do not have a primary care provider, please call [phone] and they will assist you.        Care EveryWhere ID     This is your Care EveryWhere ID. This could be used by other organizations to access your [facility] medical records  [id number]        Your Vitals Were     Pulse Pulse Oximetry                69 98%           Blood Pressure from Last 3 Encounters:   10/04/18 118/69   09/06/18 110/73   08/09/18 94/68    Weight from Last 3 Encounters:   09/06/18 95.3 kg (210 lb)   06/01/18 97.6 kg (215 lb 3.2 oz)   03/02/18 97.5 kg (215 lb)              Today, you had the following     No orders found for display         Where to get your medicines      These medications were sent to [facility] [address]  [address] [address]     Phone:  [phone]     ISOtretinoin 30 MG Caps          Primary Care Provider Office Phone # Fax #    Mervat Schroeder [phone] [address] [place] [address]        Equal Access to Services     SUSHIL MUHAMMAD AH: zita Bryan  giselle estherpj shortgus kaye ah. So [facility] [phone].    ATENCIÓN: Si sina carson, tiene a gutierrez disposición servicios gratuitos de asistencia lingüística. Anita al [phone].    We comply with applicable federal civil rights laws and [place] laws. We do not discriminate on the basis of race, color, national origin, age, disability, sex, sexual orientation, or gender identity.            Thank you!     Thank you for choosing [facility]  for your care. Our goal is always to provide you with excellent care. Hearing back from our patients is one way we can continue to improve our services. Please take a few minutes to complete the written survey that you may receive in the mail after your visit with us. Thank you!             Your Updated Medication List - Protect others around you: Learn how to safely use, store and throw away your medicines at www.disposemymeds.org.          This list is accurate as of 10/4/18  7:23 AM.  Always use your most recent med list.                   Brand Name Dispense Instructions for use Diagnosis    * ISOtretinoin 40 MG capsule    ACCUTANE    30 capsule    Take 1 capsule (40 mg) by mouth daily with food    Acne vulgaris       * ISOtretinoin 30 MG Caps     60 capsule    Take 1 capsule by mouth 2 times daily (with meals)    Acne vulgaris       metFORMIN 850 MG tablet    GLUCOPHAGE    180 tablet    Take 1 tablet (850 mg) by mouth 2 times daily (with meals)    Amenorrhea, secondary       norethindrone-ethinyl estradiol 1-35 MG-MCG per tablet    ORTHO-NOVUM 1-35 TAB,NORTREL 1-35 TAB    84 tablet    Take 1 tablet by mouth daily    Absence of menstruation       sertraline 100 MG tablet    ZOLOFT    30 tablet    Take 1 tablet (100 mg) by mouth daily    Generalized anxiety disorder, Moderate episode of recurrent major depressive disorder (H)       * Notice:  This list has 2 medication(s) that are the same as other medications  prescribed for you. Read the directions carefully, and ask your doctor or other care provider to review them with you.

## 2018-10-08 ENCOUNTER — TELEPHONE (OUTPATIENT)
Dept: DERMATOLOGY | Facility: CLINIC | Age: 23
End: 2018-10-08

## 2018-10-08 NOTE — TELEPHONE ENCOUNTER
Unable to reach patient. Message left that Provider has now updated ipledge, Call back if further questions/problems. Celestina Alfonso RN

## 2018-10-08 NOTE — TELEPHONE ENCOUNTER
Reason for Call:  Other I Pledge    Detailed comments: Pt states you need to do your part of I Pledge for accutane, please call    Phone Number Patient can be reached at: Home number on file [phone] (home)    Best Time: today    Can we leave a detailed message on this number? YES    Call taken on 10/8/2018 at 10:11 AM by Carmencita Alcala

## 2018-11-08 ENCOUNTER — OFFICE VISIT (OUTPATIENT)
Dept: DERMATOLOGY | Facility: CLINIC | Age: 23
End: 2018-11-08
Payer: COMMERCIAL

## 2018-11-08 VITALS — SYSTOLIC BLOOD PRESSURE: 107 MMHG | DIASTOLIC BLOOD PRESSURE: 72 MMHG | OXYGEN SATURATION: 96 % | HEART RATE: 85 BPM

## 2018-11-08 DIAGNOSIS — L70.0 ACNE VULGARIS: ICD-10-CM

## 2018-11-08 LAB
ALBUMIN SERPL-MCNC: 3.7 G/DL (ref 3.4–5)
ALP SERPL-CCNC: 75 U/L (ref 40–150)
ALT SERPL W P-5'-P-CCNC: 41 U/L (ref 0–50)
ANION GAP SERPL CALCULATED.3IONS-SCNC: 8 MMOL/L (ref 3–14)
AST SERPL W P-5'-P-CCNC: 30 U/L (ref 0–45)
B-HCG SERPL-ACNC: <1 IU/L (ref 0–5)
BILIRUB SERPL-MCNC: 0.6 MG/DL (ref 0.2–1.3)
BUN SERPL-MCNC: 9 MG/DL (ref 7–30)
CALCIUM SERPL-MCNC: 8.6 MG/DL (ref 8.5–10.1)
CHLORIDE SERPL-SCNC: 105 MMOL/L (ref 94–109)
CHOLEST SERPL-MCNC: 182 MG/DL
CO2 SERPL-SCNC: 25 MMOL/L (ref 20–32)
CREAT SERPL-MCNC: 0.58 MG/DL (ref 0.52–1.04)
ERYTHROCYTE [DISTWIDTH] IN BLOOD BY AUTOMATED COUNT: 13.1 % (ref 10–15)
GFR SERPL CREATININE-BSD FRML MDRD: >90 ML/MIN/1.7M2
GLUCOSE SERPL-MCNC: 129 MG/DL (ref 70–99)
HCT VFR BLD AUTO: 41.2 % (ref 35–47)
HDLC SERPL-MCNC: 37 MG/DL
HGB BLD-MCNC: 13.8 G/DL (ref 11.7–15.7)
LDLC SERPL CALC-MCNC: 130 MG/DL
MCH RBC QN AUTO: 28.8 PG (ref 26.5–33)
MCHC RBC AUTO-ENTMCNC: 33.5 G/DL (ref 31.5–36.5)
MCV RBC AUTO: 86 FL (ref 78–100)
NONHDLC SERPL-MCNC: 145 MG/DL
PLATELET # BLD AUTO: 298 10E9/L (ref 150–450)
POTASSIUM SERPL-SCNC: 4.3 MMOL/L (ref 3.4–5.3)
PROT SERPL-MCNC: 8.2 G/DL (ref 6.8–8.8)
RBC # BLD AUTO: 4.8 10E12/L (ref 3.8–5.2)
SODIUM SERPL-SCNC: 138 MMOL/L (ref 133–144)
TRIGL SERPL-MCNC: 75 MG/DL
WBC # BLD AUTO: 9.3 10E9/L (ref 4–11)

## 2018-11-08 PROCEDURE — 80053 COMPREHEN METABOLIC PANEL: CPT | Performed by: PHYSICIAN ASSISTANT

## 2018-11-08 PROCEDURE — 99213 OFFICE O/P EST LOW 20 MIN: CPT | Performed by: PHYSICIAN ASSISTANT

## 2018-11-08 PROCEDURE — 36415 COLL VENOUS BLD VENIPUNCTURE: CPT | Performed by: PHYSICIAN ASSISTANT

## 2018-11-08 PROCEDURE — 85027 COMPLETE CBC AUTOMATED: CPT | Performed by: PHYSICIAN ASSISTANT

## 2018-11-08 PROCEDURE — 80061 LIPID PANEL: CPT | Performed by: PHYSICIAN ASSISTANT

## 2018-11-08 PROCEDURE — 84702 CHORIONIC GONADOTROPIN TEST: CPT | Performed by: PHYSICIAN ASSISTANT

## 2018-11-08 RX ORDER — ISOTRETINOIN 30 MG/1
1 CAPSULE ORAL 2 TIMES DAILY WITH MEALS
Qty: 60 CAPSULE | Refills: 0 | Status: SHIPPED | OUTPATIENT
Start: 2018-11-08 | End: 2019-01-23

## 2018-11-08 NOTE — NURSING NOTE
"Initial /72 (BP Location: Right arm, Patient Position: Sitting, Cuff Size: Adult Large)  Pulse 85  SpO2 96% Estimated body mass index is 39.36 kg/(m^2) as calculated from the following:    Height as of 6/1/18: 1.556 m (5' 1.25\").    Weight as of 9/6/18: 95.3 kg (210 lb). .      "

## 2018-11-08 NOTE — MR AVS SNAPSHOT
After Visit Summary   11/8/2018    Chloe Obando    MRN: [id number]           Patient Information     Date Of Birth          1995        Visit Information        Provider Department      11/8/2018 8:00 AM Yasmine Lyman PA-C [facility]        Today's Diagnoses     Acne vulgaris           Follow-ups after your visit        Your next 10 appointments already scheduled     Dec 06, 2018  7:10 AM CST   LAB with [facility] ([facility])    [address] [address]   [phone]           Please do not eat 10-12 hours before your appointment if you are coming in fasting for labs on lipids, cholesterol, or glucose (sugar). This does not apply to pregnant women. Water, hot tea and black coffee (with nothing added) are okay. Do not drink other fluids, diet soda or chew gum.            Dec 06, 2018  7:20 AM CST   Return Visit with Yasmine Lyman PA-C   [facility] ([facility])    [address] [address]   [phone]              Who to contact     If you have questions or need follow up information about today's clinic visit or your schedule please contact [facility] directly at [phone].  Normal or non-critical lab and imaging results will be communicated to you by MyChart, letter or phone within 4 business days after the clinic has received the results. If you do not hear from us within 7 days, please contact the clinic through MyChart or phone. If you have a critical or abnormal lab result, we will notify you by phone as soon as possible.  Submit refill requests through MyFeelBack or call your pharmacy and they will forward the refill request to us. Please allow 3 business days for your refill to be completed.          Additional Information About Your Visit        InfogramharSuper Vitamin D Information     MyFeelBack gives you secure access to your electronic health record.  If you see a primary care provider, you can also send messages to your care team and make appointments. If you have questions, please call your primary care clinic.  If you do not have a primary care provider, please call [phone] and they will assist you.        Care EveryWhere ID     This is your Care EveryWhere ID. This could be used by other organizations to access your [facility] medical records  [id number]        Your Vitals Were     Pulse Pulse Oximetry                85 96%           Blood Pressure from Last 3 Encounters:   11/08/18 107/72   10/04/18 118/69   09/06/18 110/73    Weight from Last 3 Encounters:   09/06/18 95.3 kg (210 lb)   06/01/18 97.6 kg (215 lb 3.2 oz)   03/02/18 97.5 kg (215 lb)              We Performed the Following     CBC with platelets     Comprehensive metabolic panel     HCG quantitative pregnancy     Lipid panel reflex to direct LDL Non-fasting          Where to get your medicines      These medications were sent to [facility] - [address] [address]     Phone:  [phone]     ISOtretinoin 30 MG Caps          Primary Care Provider Office Phone # Fax #    Mervat Mathiasangel Schroeder [phone] [address] [place] [address]   [facility] [address]        Equal Access to Services     SUSHIL MUHAMMAD AH: Hadii abiel richmondo Soomaali, waaxda luqadaha, qaybta kaalmada adeegyada, gus gomez. So [facility] [phone].    ATENCIÓN: Si habla español, tiene a gutierrez disposición servicios gratuitos de asistencia lingüística. Llame al [phone].    We comply with applicable federal civil rights laws and [place] laws. We do not discriminate on the basis of race, color, national origin, age, disability, sex, sexual orientation, or gender identity.            Thank you!     Thank you for choosing [facility]  for your care. Our goal is always to provide you with excellent care. Hearing  back from our patients is one way we can continue to improve our services. Please take a few minutes to complete the written survey that you may receive in the mail after your visit with us. Thank you!             Your Updated Medication List - Protect others around you: Learn how to safely use, store and throw away your medicines at www.disposemymeds.org.          This list is accurate as of 11/8/18 10:17 AM.  Always use your most recent med list.                   Brand Name Dispense Instructions for use Diagnosis    * ISOtretinoin 40 MG capsule    ACCUTANE    30 capsule    Take 1 capsule (40 mg) by mouth daily with food    Acne vulgaris       * ISOtretinoin 30 MG Caps     60 capsule    Take 1 capsule by mouth 2 times daily (with meals)    Acne vulgaris       metFORMIN 850 MG tablet    GLUCOPHAGE    180 tablet    Take 1 tablet (850 mg) by mouth 2 times daily (with meals)    Amenorrhea, secondary       norethindrone-ethinyl estradiol 1-35 MG-MCG per tablet    ORTHO-NOVUM 1-35 TAB,NORTREL 1-35 TAB    84 tablet    Take 1 tablet by mouth daily    Absence of menstruation       sertraline 100 MG tablet    ZOLOFT    30 tablet    Take 1 tablet (100 mg) by mouth daily    Generalized anxiety disorder, Moderate episode of recurrent major depressive disorder (H)       * Notice:  This list has 2 medication(s) that are the same as other medications prescribed for you. Read the directions carefully, and ask your doctor or other care provider to review them with you.

## 2018-11-08 NOTE — PROGRESS NOTES
Chloe Obando is a 23 year old year old female patient here today for recheck acne. She reports that her skin is clear with isotretinoin. She denies any side effects. Has noticed brownish scaly area that almost looks like dirty skin around neck. She reports that she noticed it about a month ago. Patient has no other skin complaints today.  Remainder of the HPI, Meds, PMH, Allergies, FH, and SH was reviewed in chart.    Pertinent Hx:   Acne vulgaris   Past Medical History:   Diagnosis Date     Closed fracture of unspecified part of humerus     Left Humerus Fracture     Hematuria as child     Urinary tract infection, site not specified as child    nl renal U/S, no VCUG       Past Surgical History:   Procedure Laterality Date     TONSILLECTOMY ADULT Bilateral 3/15/2017    Procedure: TONSILLECTOMY ADULT;  Surgeon: Kamini Valdovinos MD;  Location: [facility] OR        Family History   Problem Relation Age of Onset     Diabetes Mother      gestational diabetes (history of)     Family History Negative Father      C.A.D. Maternal Grandmother      Quad. Bypass at age 74     Diabetes Maternal Grandmother      Asthma Maternal Grandmother      Lipids Maternal Grandmother      Arthritis Maternal Grandfather      Hypertension Paternal Grandmother      Lipids Paternal Grandmother      cholesterol     Other Cancer Paternal Grandmother      lung cancer     Family History Negative Paternal Grandfather        Social History     Social History     Marital status: Single     Spouse name: N/A     Number of children: N/A     Years of education: N/A     Occupational History     Not on file.     Social History Main Topics     Smoking status: Never Smoker     Smokeless tobacco: Never Used      Comment: parents don't smoke in the house     Alcohol use No     Drug use: No     Sexual activity: Yes     Partners: Male     Birth control/ protection: Condom, Pill     Other Topics Concern     Parent/Sibling W/ Cabg, Mi Or Angioplasty Before 65f 55m? No      Social History Narrative       Outpatient Encounter Prescriptions as of 11/8/2018   Medication Sig Dispense Refill     ISOtretinoin 30 MG CAPS Take 1 capsule by mouth 2 times daily (with meals) 60 capsule 0     metFORMIN (GLUCOPHAGE) 850 MG tablet Take 1 tablet (850 mg) by mouth 2 times daily (with meals) 180 tablet 3     norethindrone-ethinyl estradiol (ORTHO-NOVUM 1-35 TAB,NORTREL 1-35 TAB) 1-35 MG-MCG per tablet Take 1 tablet by mouth daily 84 tablet 3     sertraline (ZOLOFT) 100 MG tablet Take 1 tablet (100 mg) by mouth daily 30 tablet 0     ISOtretinoin (ACCUTANE) 40 MG capsule Take 1 capsule (40 mg) by mouth daily with food (Patient not taking: Reported on 10/4/2018) 30 capsule 0     [DISCONTINUED] ISOtretinoin 30 MG CAPS Take 1 capsule by mouth 2 times daily (with meals) 60 capsule 0     No facility-administered encounter medications on file as of 11/8/2018.              Review Of Systems  Skin: As above  Eyes: negative  Ears/Nose/Throat: negative  Respiratory: No shortness of breath, dyspnea on exertion, cough, or hemoptysis  Cardiovascular: negative  Gastrointestinal: negative  Genitourinary: negative  Musculoskeletal: negative  Neurologic: negative  Psychiatric: negative  Hematologic/Lymphatic/Immunologic: negative  Endocrine: negative      O:   NAD, WDWN, Alert & Oriented, Mood & Affect wnl, Vitals stable   Here today alone   /72 (BP Location: Right arm, Patient Position: Sitting, Cuff Size: Adult Large)  Pulse 85  SpO2 96%   General appearance normal   Vitals stable   Alert, oriented and in no acute distress     Face and back are clear  Mild scaly brown thickened leathery patches around neck       Eyes: Conjunctivae/lids:Normal     ENT: Lips: normal    MSK:Normal    Pulm: Breathing Normal    Neuro/Psych: Orientation:Normal; Mood/Affect:Normal  A/P:  1. Acne Vulgaris  - doing great. No side effects.   Continue 30 mg twice daily. Last month of treatment.   Patient is aware of depression side  effects and she will make us or PCP aware if mood changes. She notes that it is well controlled and did not have any issues with mood in the past with isotretinoin.   Standing hcg, CBC, CMP and fasting lipids  Ipledge reviewed with patient and Ipledge consent form complete  Patient place in ipledge system  Contraception: 1. Birth control pills 2. Condoms   Current Dosage: 10,800 mg    Goal Dosage: 11,727 mg (120 mg/kg)  Ipledge: [id number]  Return to clinic 30 days  Dry lips and mouth, minor swelling of the eyelids or lips, crusty skin, nosebleeds, GI upset, or thinning of hair may occur. If any of these effects persist or worsen, tell your doctor or pharmacist promptly.   To relieve dry mouth, suck on (sugarless) hard candy or ice chips, chew (sugarless) gum, drink water.   Remember that your doctor has prescribed this medication because he or she has judged that the benefit to you is greater than the risk of side effects. Many people using this medication do not have serious side effects.   Contact office immediately if you have any of these unlikely but serious side effects: mental/mood changes (e.g., depression,  aggressive or violent behavior, and in rare cases, thoughts of suicide), tingling feeling in the skin, quick/severe sun sensitivity, back/joint/muscle pain, signs of infection (e.g., fever, persistent sore throat, painful swallowing, peeling skin on palms/soles.   Isotretinoin may infrequently cause disease of the pancreatitis, that may rarely be fatal. Stop taking this medication and contact office immediately if you develop: severe stomach pain severe or persistent GI upset,   Stop taking this medication and tell your doctor immediately if you develop these unlikely but very serious side effects: severe headache, vision changes, ear ringing, hearling loss, chest pain, yellowing eyes, skin, dark urine, severe diarrhea, rectal bleeding,   Seek immediate medical attention if you notice any symptoms of a  serious allergic reaction.      Accutane is discussed fully with the patient. It is a very effective drug to treat acne vulgaris but has many potential significant side effects. Chief among these are teratogensis, hepatic injury, dyslipidemia and severe drying of the mucous membranes. All of these issues have been discussed in details. Monthly blood tests to monitor lipids and liver functions will be necessary. Expect painful dryness and/or fissuring around the lips, eyes, and other moist areas of the body. Balms may be protective. Contact lens may be too painful to wear temporarily while on this drug. Episodes of significant depression have been reported, including suicidal ideation and attempts in rare cases. It may also cause pseudotumor cerebri and hyperostosis. The patient will report any such changes in mood, depressive symptoms or suicidal thoughts, headaches, joint or bone pains. There is also a possible association with inflammatory bowel disease, although this is unproven at this point.       2. Acanthosis Nigricans  Discussed pathophysiology and that this can be difficult to treat.   Can use tretinoin on neck, would wait until down with isotretinoin. Can try AmLactin first.

## 2018-11-08 NOTE — LETTER
11/8/2018         RE: Chloe Obando  [address] [address]        Dear Colleague,    Thank you for referring your patient, Chloe Obando, to the [facility]. Please see a copy of my visit note below.    Chloe Obando is a 23 year old year old female patient here today for recheck acne. She reports that her skin is clear with isotretinoin. She denies any side effects. Has noticed brownish scaly area that almost looks like dirty skin around neck. She reports that she noticed it about a month ago. Patient has no other skin complaints today.  Remainder of the HPI, Meds, PMH, Allergies, FH, and SH was reviewed in chart.    Pertinent Hx:   Acne vulgaris   Past Medical History:   Diagnosis Date     Closed fracture of unspecified part of humerus     Left Humerus Fracture     Hematuria as child     Urinary tract infection, site not specified as child    nl renal U/S, no VCUG       Past Surgical History:   Procedure Laterality Date     TONSILLECTOMY ADULT Bilateral 3/15/2017    Procedure: TONSILLECTOMY ADULT;  Surgeon: Kamini Valdovinos MD;  Location: [facility] OR        Family History   Problem Relation Age of Onset     Diabetes Mother      gestational diabetes (history of)     Family History Negative Father      C.A.D. Maternal Grandmother      Quad. Bypass at age 74     Diabetes Maternal Grandmother      Asthma Maternal Grandmother      Lipids Maternal Grandmother      Arthritis Maternal Grandfather      Hypertension Paternal Grandmother      Lipids Paternal Grandmother      cholesterol     Other Cancer Paternal Grandmother      lung cancer     Family History Negative Paternal Grandfather        Social History     Social History     Marital status: Single     Spouse name: N/A     Number of children: N/A     Years of education: N/A     Occupational History     Not on file.     Social History Main Topics     Smoking status: Never Smoker     Smokeless tobacco: Never Used      Comment: parents don't  smoke in the house     Alcohol use No     Drug use: No     Sexual activity: Yes     Partners: Male     Birth control/ protection: Condom, Pill     Other Topics Concern     Parent/Sibling W/ Cabg, Mi Or Angioplasty Before 65f 55m? No     Social History Narrative       Outpatient Encounter Prescriptions as of 11/8/2018   Medication Sig Dispense Refill     ISOtretinoin 30 MG CAPS Take 1 capsule by mouth 2 times daily (with meals) 60 capsule 0     metFORMIN (GLUCOPHAGE) 850 MG tablet Take 1 tablet (850 mg) by mouth 2 times daily (with meals) 180 tablet 3     norethindrone-ethinyl estradiol (ORTHO-NOVUM 1-35 TAB,NORTREL 1-35 TAB) 1-35 MG-MCG per tablet Take 1 tablet by mouth daily 84 tablet 3     sertraline (ZOLOFT) 100 MG tablet Take 1 tablet (100 mg) by mouth daily 30 tablet 0     ISOtretinoin (ACCUTANE) 40 MG capsule Take 1 capsule (40 mg) by mouth daily with food (Patient not taking: Reported on 10/4/2018) 30 capsule 0     [DISCONTINUED] ISOtretinoin 30 MG CAPS Take 1 capsule by mouth 2 times daily (with meals) 60 capsule 0     No facility-administered encounter medications on file as of 11/8/2018.              Review Of Systems  Skin: As above  Eyes: negative  Ears/Nose/Throat: negative  Respiratory: No shortness of breath, dyspnea on exertion, cough, or hemoptysis  Cardiovascular: negative  Gastrointestinal: negative  Genitourinary: negative  Musculoskeletal: negative  Neurologic: negative  Psychiatric: negative  Hematologic/Lymphatic/Immunologic: negative  Endocrine: negative      O:   NAD, WDWN, Alert & Oriented, Mood & Affect wnl, Vitals stable   Here today alone   /72 (BP Location: Right arm, Patient Position: Sitting, Cuff Size: Adult Large)  Pulse 85  SpO2 96%   General appearance normal   Vitals stable   Alert, oriented and in no acute distress     Face and back are clear  Mild scaly brown thickened leathery patches around neck       Eyes: Conjunctivae/lids:Normal     ENT: Lips:  normal    MSK:Normal    Pulm: Breathing Normal    Neuro/Psych: Orientation:Normal; Mood/Affect:Normal  A/P:  1. Acne Vulgaris  - doing great. No side effects.   Continue 30 mg twice daily. Last month of treatment.   Patient is aware of depression side effects and she will make us or PCP aware if mood changes. She notes that it is well controlled and did not have any issues with mood in the past with isotretinoin.   Standing hcg, CBC, CMP and fasting lipids  Ipledge reviewed with patient and Ipledge consent form complete  Patient place in ipledge system  Contraception: 1. Birth control pills 2. Condoms   Current Dosage: 10,800 mg    Goal Dosage: 11,727 mg (120 mg/kg)  Ipledge: [id number]  Return to clinic 30 days  Dry lips and mouth, minor swelling of the eyelids or lips, crusty skin, nosebleeds, GI upset, or thinning of hair may occur. If any of these effects persist or worsen, tell your doctor or pharmacist promptly.   To relieve dry mouth, suck on (sugarless) hard candy or ice chips, chew (sugarless) gum, drink water.   Remember that your doctor has prescribed this medication because he or she has judged that the benefit to you is greater than the risk of side effects. Many people using this medication do not have serious side effects.   Contact office immediately if you have any of these unlikely but serious side effects: mental/mood changes (e.g., depression,  aggressive or violent behavior, and in rare cases, thoughts of suicide), tingling feeling in the skin, quick/severe sun sensitivity, back/joint/muscle pain, signs of infection (e.g., fever, persistent sore throat, painful swallowing, peeling skin on palms/soles.   Isotretinoin may infrequently cause disease of the pancreatitis, that may rarely be fatal. Stop taking this medication and contact office immediately if you develop: severe stomach pain severe or persistent GI upset,   Stop taking this medication and tell your doctor immediately if you develop  these unlikely but very serious side effects: severe headache, vision changes, ear ringing, hearling loss, chest pain, yellowing eyes, skin, dark urine, severe diarrhea, rectal bleeding,   Seek immediate medical attention if you notice any symptoms of a serious allergic reaction.      Accutane is discussed fully with the patient. It is a very effective drug to treat acne vulgaris but has many potential significant side effects. Chief among these are teratogensis, hepatic injury, dyslipidemia and severe drying of the mucous membranes. All of these issues have been discussed in details. Monthly blood tests to monitor lipids and liver functions will be necessary. Expect painful dryness and/or fissuring around the lips, eyes, and other moist areas of the body. Balms may be protective. Contact lens may be too painful to wear temporarily while on this drug. Episodes of significant depression have been reported, including suicidal ideation and attempts in rare cases. It may also cause pseudotumor cerebri and hyperostosis. The patient will report any such changes in mood, depressive symptoms or suicidal thoughts, headaches, joint or bone pains. There is also a possible association with inflammatory bowel disease, although this is unproven at this point.       2. Acanthosis Nigricans  Discussed pathophysiology and that this can be difficult to treat.   Can use tretinoin on neck, would wait until down with isotretinoin. Can try AmLactin first.       Again, thank you for allowing me to participate in the care of your patient.        Sincerely,        Yasmine Moreno PA-C

## 2018-12-04 ENCOUNTER — TELEPHONE (OUTPATIENT)
Dept: DERMATOLOGY | Facility: CLINIC | Age: 23
End: 2018-12-04

## 2018-12-04 DIAGNOSIS — Z51.81 THERAPEUTIC DRUG MONITORING: Primary | ICD-10-CM

## 2018-12-06 ENCOUNTER — OFFICE VISIT (OUTPATIENT)
Dept: DERMATOLOGY | Facility: CLINIC | Age: 23
End: 2018-12-06
Payer: COMMERCIAL

## 2018-12-06 VITALS — SYSTOLIC BLOOD PRESSURE: 108 MMHG | HEART RATE: 65 BPM | OXYGEN SATURATION: 97 % | DIASTOLIC BLOOD PRESSURE: 72 MMHG

## 2018-12-06 DIAGNOSIS — L70.0 ACNE VULGARIS: Primary | ICD-10-CM

## 2018-12-06 DIAGNOSIS — Z51.81 THERAPEUTIC DRUG MONITORING: ICD-10-CM

## 2018-12-06 LAB
ALBUMIN SERPL-MCNC: 3.8 G/DL (ref 3.4–5)
ALP SERPL-CCNC: 75 U/L (ref 40–150)
ALT SERPL W P-5'-P-CCNC: 48 U/L (ref 0–50)
ANION GAP SERPL CALCULATED.3IONS-SCNC: 8 MMOL/L (ref 3–14)
AST SERPL W P-5'-P-CCNC: 28 U/L (ref 0–45)
BETA HCG QUAL IFA URINE: NEGATIVE
BILIRUB SERPL-MCNC: 0.6 MG/DL (ref 0.2–1.3)
BUN SERPL-MCNC: 10 MG/DL (ref 7–30)
CALCIUM SERPL-MCNC: 8.6 MG/DL (ref 8.5–10.1)
CHLORIDE SERPL-SCNC: 106 MMOL/L (ref 94–109)
CHOLEST SERPL-MCNC: 199 MG/DL
CO2 SERPL-SCNC: 26 MMOL/L (ref 20–32)
CREAT SERPL-MCNC: 0.57 MG/DL (ref 0.52–1.04)
ERYTHROCYTE [DISTWIDTH] IN BLOOD BY AUTOMATED COUNT: 13 % (ref 10–15)
GFR SERPL CREATININE-BSD FRML MDRD: >90 ML/MIN/1.7M2
GLUCOSE SERPL-MCNC: 127 MG/DL (ref 70–99)
HCT VFR BLD AUTO: 41.6 % (ref 35–47)
HDLC SERPL-MCNC: 38 MG/DL
HGB BLD-MCNC: 13.8 G/DL (ref 11.7–15.7)
LDLC SERPL CALC-MCNC: 145 MG/DL
MCH RBC QN AUTO: 28.5 PG (ref 26.5–33)
MCHC RBC AUTO-ENTMCNC: 33.2 G/DL (ref 31.5–36.5)
MCV RBC AUTO: 86 FL (ref 78–100)
NONHDLC SERPL-MCNC: 161 MG/DL
PLATELET # BLD AUTO: 308 10E9/L (ref 150–450)
POTASSIUM SERPL-SCNC: 4.2 MMOL/L (ref 3.4–5.3)
PROT SERPL-MCNC: 8.1 G/DL (ref 6.8–8.8)
RBC # BLD AUTO: 4.84 10E12/L (ref 3.8–5.2)
SODIUM SERPL-SCNC: 140 MMOL/L (ref 133–144)
TRIGL SERPL-MCNC: 79 MG/DL
WBC # BLD AUTO: 9.7 10E9/L (ref 4–11)

## 2018-12-06 PROCEDURE — 80061 LIPID PANEL: CPT | Performed by: PHYSICIAN ASSISTANT

## 2018-12-06 PROCEDURE — 85027 COMPLETE CBC AUTOMATED: CPT | Performed by: PHYSICIAN ASSISTANT

## 2018-12-06 PROCEDURE — 84703 CHORIONIC GONADOTROPIN ASSAY: CPT | Performed by: PHYSICIAN ASSISTANT

## 2018-12-06 PROCEDURE — 99213 OFFICE O/P EST LOW 20 MIN: CPT | Performed by: PHYSICIAN ASSISTANT

## 2018-12-06 PROCEDURE — 36415 COLL VENOUS BLD VENIPUNCTURE: CPT | Performed by: PHYSICIAN ASSISTANT

## 2018-12-06 PROCEDURE — 80053 COMPREHEN METABOLIC PANEL: CPT | Performed by: PHYSICIAN ASSISTANT

## 2018-12-06 RX ORDER — TRETINOIN 0.5 MG/G
CREAM TOPICAL
Qty: 45 G | Refills: 11 | Status: SHIPPED | OUTPATIENT
Start: 2018-12-06 | End: 2019-01-23

## 2018-12-06 NOTE — MR AVS SNAPSHOT
After Visit Summary   12/6/2018    Chloe Obando    MRN: [id number]           Patient Information     Date Of Birth          1995        Visit Information        Provider Department      12/6/2018 7:20 AM Yasmine Lyman PA-C [facility]        Today's Diagnoses     Acne vulgaris    -  1    Therapeutic drug monitoring           Follow-ups after your visit        Who to contact     If you have questions or need follow up information about today's clinic visit or your schedule please contact [facility] directly at [phone].  Normal or non-critical lab and imaging results will be communicated to you by De Correspondenthart, letter or phone within 4 business days after the clinic has received the results. If you do not hear from us within 7 days, please contact the clinic through Quickcuet or phone. If you have a critical or abnormal lab result, we will notify you by phone as soon as possible.  Submit refill requests through ClickSquared or call your pharmacy and they will forward the refill request to us. Please allow 3 business days for your refill to be completed.          Additional Information About Your Visit        MyChart Information     ClickSquared gives you secure access to your electronic health record. If you see a primary care provider, you can also send messages to your care team and make appointments. If you have questions, please call your primary care clinic.  If you do not have a primary care provider, please call [phone] and they will assist you.        Care EveryWhere ID     This is your Care EveryWhere ID. This could be used by other organizations to access your [place] medical records  [id number]        Your Vitals Were     Pulse Pulse Oximetry                65 97%           Blood Pressure from Last 3 Encounters:   12/06/18 108/72   11/08/18 107/72   10/04/18 118/69    Weight from Last 3 Encounters:   09/06/18 95.3 kg (210 lb)   06/01/18 97.6 kg (215 lb 3.2  oz)   03/02/18 97.5 kg (215 lb)              We Performed the Following     Beta HCG qual IFA urine     CBC with platelets     Comprehensive metabolic panel     Lipid panel reflex to direct LDL Non-fasting          Today's Medication Changes          These changes are accurate as of 12/6/18  7:50 AM.  If you have any questions, ask your nurse or doctor.               Start taking these medicines.        Dose/Directions    tretinoin 0.05 % external cream   Commonly known as:  RETIN-A   Used for:  Acne vulgaris   Started by:  Yasmine Lyman PA-C        Spread a pea size amount into affected area topically at bedtime.  Use sunscreen SPF>20.   Quantity:  45 g   Refills:  11         These medicines have changed or have updated prescriptions.        Dose/Directions    ISOtretinoin 30 MG capsule   Commonly known as:  ABSORICA   This may have changed:  Another medication with the same name was removed. Continue taking this medication, and follow the directions you see here.   Used for:  Acne vulgaris   Changed by:  Yasmine Lyman PA-C        Dose:  1 capsule   Take 1 capsule by mouth 2 times daily (with meals)   Quantity:  60 capsule   Refills:  0            Where to get your medicines      These medications were sent to [facility] [place] [id number] [address]     Phone:  [phone]     tretinoin 0.05 % external cream                Primary Care Provider Office Phone # Fax #    Mervat Easton DO Alexandre [phone] [address] [facility] [address]        Equal Access to Services     SUSHIL MUHAMMAD AH: Amanuel Donald, waaxda luqadaha, qaybta kaalmada ademameyada, gus gomez. So [facility] [phone].    ATENCIÓN: Si habla español, tiene a gutierrez disposición servicios gratuitos de asistencia lingüística. Llame al [phone].    We comply with applicable federal civil rights laws and  [place] laws. We do not discriminate on the basis of race, color, national origin, age, disability, sex, sexual orientation, or gender identity.            Thank you!     Thank you for choosing [facility]  for your care. Our goal is always to provide you with excellent care. Hearing back from our patients is one way we can continue to improve our services. Please take a few minutes to complete the written survey that you may receive in the mail after your visit with us. Thank you!             Your Updated Medication List - Protect others around you: Learn how to safely use, store and throw away your medicines at www.disposemymeds.org.          This list is accurate as of 12/6/18  7:50 AM.  Always use your most recent med list.                   Brand Name Dispense Instructions for use Diagnosis    ISOtretinoin 30 MG capsule    ABSORICA    60 capsule    Take 1 capsule by mouth 2 times daily (with meals)    Acne vulgaris       metFORMIN 850 MG tablet    GLUCOPHAGE    180 tablet    Take 1 tablet (850 mg) by mouth 2 times daily (with meals)    Amenorrhea, secondary       norethindrone-ethinyl estradiol 1-35 MG-MCG tablet    ORTHO-NOVUM 1-35 TAB,NORTREL 1-35 TAB    84 tablet    Take 1 tablet by mouth daily    Absence of menstruation       sertraline 100 MG tablet    ZOLOFT    30 tablet    Take 1 tablet (100 mg) by mouth daily    Generalized anxiety disorder, Moderate episode of recurrent major depressive disorder (H)       tretinoin 0.05 % external cream    RETIN-A    45 g    Spread a pea size amount into affected area topically at bedtime.  Use sunscreen SPF>20.    Acne vulgaris

## 2018-12-06 NOTE — NURSING NOTE
"Initial /72  Pulse 65  SpO2 97% Estimated body mass index is 39.36 kg/(m^2) as calculated from the following:    Height as of 6/1/18: 1.556 m (5' 1.25\").    Weight as of 9/6/18: 95.3 kg (210 lb). .    Leonila Garcia LPN    "

## 2018-12-06 NOTE — LETTER
12/6/2018         RE: Chloe Obando  [address] [facility] [id number]        Dear Colleague,    Thank you for referring your patient, Chloe Obando, to the [facility]. Please see a copy of my visit note below.    Chloe Obando is a 23 year old year old female patient here today for recheck acne vulgaris. She reports that her skin continues to stay clear. She is happy with results. She denies any mood changes or other side effects.  Patient has no other skin complaints today.  Remainder of the HPI, Meds, PMH, Allergies, FH, and SH was reviewed in chart.    Pertinent Hx:   Acne Vulgaris   Past Medical History:   Diagnosis Date     Closed fracture of unspecified part of humerus     Left Humerus Fracture     Hematuria as child     Urinary tract infection, site not specified as child    nl renal U/S, no VCUG       Past Surgical History:   Procedure Laterality Date     TONSILLECTOMY ADULT Bilateral 3/15/2017    Procedure: TONSILLECTOMY ADULT;  Surgeon: Kamini Valdovinos MD;  Location: [facility] OR        Family History   Problem Relation Age of Onset     Diabetes Mother      gestational diabetes (history of)     Family History Negative Father      C.A.D. Maternal Grandmother      Quad. Bypass at age 74     Diabetes Maternal Grandmother      Asthma Maternal Grandmother      Lipids Maternal Grandmother      Arthritis Maternal Grandfather      Hypertension Paternal Grandmother      Lipids Paternal Grandmother      cholesterol     Other Cancer Paternal Grandmother      lung cancer     Family History Negative Paternal Grandfather        Social History     Social History     Marital status: Single     Spouse name: N/A     Number of children: N/A     Years of education: N/A     Occupational History     Not on file.     Social History Main Topics     Smoking status: Never Smoker     Smokeless tobacco: Never Used      Comment: parents don't smoke in the house     Alcohol use No     Drug use: No     Sexual activity:  Yes     Partners: Male     Birth control/ protection: Condom, Pill     Other Topics Concern     Parent/Sibling W/ Cabg, Mi Or Angioplasty Before 65f 55m? No     Social History Narrative       Outpatient Encounter Prescriptions as of 12/6/2018   Medication Sig Dispense Refill     ISOtretinoin 30 MG CAPS Take 1 capsule by mouth 2 times daily (with meals) 60 capsule 0     metFORMIN (GLUCOPHAGE) 850 MG tablet Take 1 tablet (850 mg) by mouth 2 times daily (with meals) 180 tablet 3     norethindrone-ethinyl estradiol (ORTHO-NOVUM 1-35 TAB,NORTREL 1-35 TAB) 1-35 MG-MCG per tablet Take 1 tablet by mouth daily 84 tablet 3     sertraline (ZOLOFT) 100 MG tablet Take 1 tablet (100 mg) by mouth daily 30 tablet 0     tretinoin (RETIN-A) 0.05 % external cream Spread a pea size amount into affected area topically at bedtime.  Use sunscreen SPF>20. 45 g 11     [DISCONTINUED] ISOtretinoin (ACCUTANE) 40 MG capsule Take 1 capsule (40 mg) by mouth daily with food (Patient not taking: Reported on 10/4/2018) 30 capsule 0     No facility-administered encounter medications on file as of 12/6/2018.              Review Of Systems  Skin: As above  Eyes: negative  Ears/Nose/Throat: negative  Respiratory: No shortness of breath, dyspnea on exertion, cough, or hemoptysis  Cardiovascular: negative  Gastrointestinal: negative  Genitourinary: negative  Musculoskeletal: negative  Neurologic: negative  Psychiatric: negative  Hematologic/Lymphatic/Immunologic: negative  Endocrine: negative      O:   NAD, WDWN, Alert & Oriented, Mood & Affect wnl, Vitals stable   Here today alone   /72  Pulse 65  SpO2 97%   General appearance normal   Vitals stable   Alert, oriented and in no acute distress     Skin is clear today    Eyes: Conjunctivae/lids:Normal     ENT: Lips: normal    MSK:Normal    Pulm: Breathing Normal    Neuro/Psych: Orientation:Normal; Mood/Affect:Normal  A/P:  1. Acne Vulgaris  - finished   Patient is aware of depression side effects  and she will make us or PCP aware if mood changes. She notes that it is well controlled and did not have any issues with mood in the past with isotretinoin.   Standing hcg, CBC, CMP and fasting lipids.   Recheck urine pregnancy in one month.  Ipledge reviewed with patient and Ipledge consent form complete  Patient place in [employer] system  Contraception: 1. Birth control pills 2. Condoms   Current Dosage: 12,600 mg    Goal Dosage: 11,727 mg (120 mg/kg)  Ipledge: [phone]  Return to clinic 30 days  Dry lips and mouth, minor swelling of the eyelids or lips, crusty skin, nosebleeds, GI upset, or thinning of hair may occur. If any of these effects persist or worsen, tell your doctor or pharmacist promptly.   To relieve dry mouth, suck on (sugarless) hard candy or ice chips, chew (sugarless) gum, drink water.   Remember that your doctor has prescribed this medication because he or she has judged that the benefit to you is greater than the risk of side effects. Many people using this medication do not have serious side effects.   Contact office immediately if you have any of these unlikely but serious side effects: mental/mood changes (e.g., depression,  aggressive or violent behavior, and in rare cases, thoughts of suicide), tingling feeling in the skin, quick/severe sun sensitivity, back/joint/muscle pain, signs of infection (e.g., fever, persistent sore throat, painful swallowing, peeling skin on palms/soles.   Isotretinoin may infrequently cause disease of the pancreatitis, that may rarely be fatal. Stop taking this medication and contact office immediately if you develop: severe stomach pain severe or persistent GI upset,   Stop taking this medication and tell your doctor immediately if you develop these unlikely but very serious side effects: severe headache, vision changes, ear ringing, hearling loss, chest pain, yellowing eyes, skin, dark urine, severe diarrhea, rectal bleeding,   Seek immediate medical attention  if you notice any symptoms of a serious allergic reaction.      Accutane is discussed fully with the patient. It is a very effective drug to treat acne vulgaris but has many potential significant side effects. Chief among these are teratogensis, hepatic injury, dyslipidemia and severe drying of the mucous membranes. All of these issues have been discussed in details. Monthly blood tests to monitor lipids and liver functions will be necessary. Expect painful dryness and/or fissuring around the lips, eyes, and other moist areas of the body. Balms may be protective. Contact lens may be too painful to wear temporarily while on this drug. Episodes of significant depression have been reported, including suicidal ideation and attempts in rare cases. It may also cause pseudotumor cerebri and hyperostosis. The patient will report any such changes in mood, depressive symptoms or suicidal thoughts, headaches, joint or bone pains. There is also a possible association with inflammatory bowel disease, although this is unproven at this point.           Again, thank you for allowing me to participate in the care of your patient.        Sincerely,        Yasmine Moreno PA-C

## 2018-12-06 NOTE — PROGRESS NOTES
Chloe Obando is a 23 year old year old female patient here today for recheck acne vulgaris. She reports that her skin continues to stay clear. She is happy with results. She denies any mood changes or other side effects.  Patient has no other skin complaints today.  Remainder of the HPI, Meds, PMH, Allergies, FH, and SH was reviewed in chart.    Pertinent Hx:   Acne Vulgaris   Past Medical History:   Diagnosis Date     Closed fracture of unspecified part of humerus     Left Humerus Fracture     Hematuria as child     Urinary tract infection, site not specified as child    nl renal U/S, no VCUG       Past Surgical History:   Procedure Laterality Date     TONSILLECTOMY ADULT Bilateral 3/15/2017    Procedure: TONSILLECTOMY ADULT;  Surgeon: Kamini Valdovinos MD;  Location: [facility] OR        Family History   Problem Relation Age of Onset     Diabetes Mother      gestational diabetes (history of)     Family History Negative Father      C.A.D. Maternal Grandmother      Quad. Bypass at age 74     Diabetes Maternal Grandmother      Asthma Maternal Grandmother      Lipids Maternal Grandmother      Arthritis Maternal Grandfather      Hypertension Paternal Grandmother      Lipids Paternal Grandmother      cholesterol     Other Cancer Paternal Grandmother      lung cancer     Family History Negative Paternal Grandfather        Social History     Social History     Marital status: Single     Spouse name: N/A     Number of children: N/A     Years of education: N/A     Occupational History     Not on file.     Social History Main Topics     Smoking status: Never Smoker     Smokeless tobacco: Never Used      Comment: parents don't smoke in the house     Alcohol use No     Drug use: No     Sexual activity: Yes     Partners: Male     Birth control/ protection: Condom, Pill     Other Topics Concern     Parent/Sibling W/ Cabg, Mi Or Angioplasty Before 65f 55m? No     Social History Narrative       Outpatient Encounter Prescriptions as of  12/6/2018   Medication Sig Dispense Refill     ISOtretinoin 30 MG CAPS Take 1 capsule by mouth 2 times daily (with meals) 60 capsule 0     metFORMIN (GLUCOPHAGE) 850 MG tablet Take 1 tablet (850 mg) by mouth 2 times daily (with meals) 180 tablet 3     norethindrone-ethinyl estradiol (ORTHO-NOVUM 1-35 TAB,NORTREL 1-35 TAB) 1-35 MG-MCG per tablet Take 1 tablet by mouth daily 84 tablet 3     sertraline (ZOLOFT) 100 MG tablet Take 1 tablet (100 mg) by mouth daily 30 tablet 0     tretinoin (RETIN-A) 0.05 % external cream Spread a pea size amount into affected area topically at bedtime.  Use sunscreen SPF>20. 45 g 11     [DISCONTINUED] ISOtretinoin (ACCUTANE) 40 MG capsule Take 1 capsule (40 mg) by mouth daily with food (Patient not taking: Reported on 10/4/2018) 30 capsule 0     No facility-administered encounter medications on file as of 12/6/2018.              Review Of Systems  Skin: As above  Eyes: negative  Ears/Nose/Throat: negative  Respiratory: No shortness of breath, dyspnea on exertion, cough, or hemoptysis  Cardiovascular: negative  Gastrointestinal: negative  Genitourinary: negative  Musculoskeletal: negative  Neurologic: negative  Psychiatric: negative  Hematologic/Lymphatic/Immunologic: negative  Endocrine: negative      O:   NAD, WDWN, Alert & Oriented, Mood & Affect wnl, Vitals stable   Here today alone   /72  Pulse 65  SpO2 97%   General appearance normal   Vitals stable   Alert, oriented and in no acute distress     Skin is clear today    Eyes: Conjunctivae/lids:Normal     ENT: Lips: normal    MSK:Normal    Pulm: Breathing Normal    Neuro/Psych: Orientation:Normal; Mood/Affect:Normal  A/P:  1. Acne Vulgaris  - finished   Patient is aware of depression side effects and she will make us or PCP aware if mood changes. She notes that it is well controlled and did not have any issues with mood in the past with isotretinoin.   Standing hcg, CBC, CMP and fasting lipids.   Recheck urine pregnancy in one  month.  Ipledge reviewed with patient and Ipledge consent form complete  Patient place in [employer] system  Contraception: 1. Birth control pills 2. Condoms   Current Dosage: 12,600 mg    Goal Dosage: 11,727 mg (120 mg/kg)  Ipledge: [phone]  Return to clinic 30 days  Dry lips and mouth, minor swelling of the eyelids or lips, crusty skin, nosebleeds, GI upset, or thinning of hair may occur. If any of these effects persist or worsen, tell your doctor or pharmacist promptly.   To relieve dry mouth, suck on (sugarless) hard candy or ice chips, chew (sugarless) gum, drink water.   Remember that your doctor has prescribed this medication because he or she has judged that the benefit to you is greater than the risk of side effects. Many people using this medication do not have serious side effects.   Contact office immediately if you have any of these unlikely but serious side effects: mental/mood changes (e.g., depression,  aggressive or violent behavior, and in rare cases, thoughts of suicide), tingling feeling in the skin, quick/severe sun sensitivity, back/joint/muscle pain, signs of infection (e.g., fever, persistent sore throat, painful swallowing, peeling skin on palms/soles.   Isotretinoin may infrequently cause disease of the pancreatitis, that may rarely be fatal. Stop taking this medication and contact office immediately if you develop: severe stomach pain severe or persistent GI upset,   Stop taking this medication and tell your doctor immediately if you develop these unlikely but very serious side effects: severe headache, vision changes, ear ringing, hearling loss, chest pain, yellowing eyes, skin, dark urine, severe diarrhea, rectal bleeding,   Seek immediate medical attention if you notice any symptoms of a serious allergic reaction.      Accutane is discussed fully with the patient. It is a very effective drug to treat acne vulgaris but has many potential significant side effects. Chief among these are  teratogensis, hepatic injury, dyslipidemia and severe drying of the mucous membranes. All of these issues have been discussed in details. Monthly blood tests to monitor lipids and liver functions will be necessary. Expect painful dryness and/or fissuring around the lips, eyes, and other moist areas of the body. Balms may be protective. Contact lens may be too painful to wear temporarily while on this drug. Episodes of significant depression have been reported, including suicidal ideation and attempts in rare cases. It may also cause pseudotumor cerebri and hyperostosis. The patient will report any such changes in mood, depressive symptoms or suicidal thoughts, headaches, joint or bone pains. There is also a possible association with inflammatory bowel disease, although this is unproven at this point.

## 2019-01-23 ENCOUNTER — OFFICE VISIT (OUTPATIENT)
Dept: INTERNAL MEDICINE | Facility: CLINIC | Age: 24
End: 2019-01-23
Payer: COMMERCIAL

## 2019-01-23 VITALS
WEIGHT: 218 LBS | HEART RATE: 74 BPM | RESPIRATION RATE: 16 BRPM | SYSTOLIC BLOOD PRESSURE: 114 MMHG | TEMPERATURE: 98 F | DIASTOLIC BLOOD PRESSURE: 70 MMHG | BODY MASS INDEX: 40.86 KG/M2 | OXYGEN SATURATION: 99 %

## 2019-01-23 DIAGNOSIS — R81 GLUCOSE FOUND IN URINE ON EXAMINATION: ICD-10-CM

## 2019-01-23 DIAGNOSIS — R10.31 RLQ ABDOMINAL PAIN: Primary | ICD-10-CM

## 2019-01-23 DIAGNOSIS — N30.00 ACUTE CYSTITIS WITHOUT HEMATURIA: ICD-10-CM

## 2019-01-23 DIAGNOSIS — E66.01 MORBID OBESITY (H): ICD-10-CM

## 2019-01-23 LAB
ALBUMIN UR-MCNC: NEGATIVE MG/DL
APPEARANCE UR: CLEAR
BACTERIA #/AREA URNS HPF: ABNORMAL /HPF
BASOPHILS # BLD AUTO: 0 10E9/L (ref 0–0.2)
BASOPHILS NFR BLD AUTO: 0.2 %
BILIRUB UR QL STRIP: NEGATIVE
COLOR UR AUTO: YELLOW
CRP SERPL-MCNC: 23 MG/L (ref 0–8)
DIFFERENTIAL METHOD BLD: NORMAL
EOSINOPHIL # BLD AUTO: 0.1 10E9/L (ref 0–0.7)
EOSINOPHIL NFR BLD AUTO: 1.3 %
ERYTHROCYTE [DISTWIDTH] IN BLOOD BY AUTOMATED COUNT: 13.3 % (ref 10–15)
GLUCOSE UR STRIP-MCNC: 250 MG/DL
HBA1C MFR BLD: 6.3 % (ref 0–5.6)
HCG UR QL: NEGATIVE
HCT VFR BLD AUTO: 41 % (ref 35–47)
HGB BLD-MCNC: 14 G/DL (ref 11.7–15.7)
HGB UR QL STRIP: ABNORMAL
KETONES UR STRIP-MCNC: NEGATIVE MG/DL
LEUKOCYTE ESTERASE UR QL STRIP: ABNORMAL
LYMPHOCYTES # BLD AUTO: 2.6 10E9/L (ref 0.8–5.3)
LYMPHOCYTES NFR BLD AUTO: 30.1 %
MCH RBC QN AUTO: 29.1 PG (ref 26.5–33)
MCHC RBC AUTO-ENTMCNC: 34.1 G/DL (ref 31.5–36.5)
MCV RBC AUTO: 85 FL (ref 78–100)
MONOCYTES # BLD AUTO: 0.7 10E9/L (ref 0–1.3)
MONOCYTES NFR BLD AUTO: 7.4 %
NEUTROPHILS # BLD AUTO: 5.4 10E9/L (ref 1.6–8.3)
NEUTROPHILS NFR BLD AUTO: 61 %
NITRATE UR QL: POSITIVE
NON-SQ EPI CELLS #/AREA URNS LPF: ABNORMAL /LPF
PH UR STRIP: 6 PH (ref 5–7)
PLATELET # BLD AUTO: 341 10E9/L (ref 150–450)
RBC # BLD AUTO: 4.81 10E12/L (ref 3.8–5.2)
RBC #/AREA URNS AUTO: ABNORMAL /HPF
SOURCE: ABNORMAL
SP GR UR STRIP: 1.02 (ref 1–1.03)
UROBILINOGEN UR STRIP-ACNC: 0.2 EU/DL (ref 0.2–1)
WBC # BLD AUTO: 8.8 10E9/L (ref 4–11)
WBC #/AREA URNS AUTO: ABNORMAL /HPF

## 2019-01-23 PROCEDURE — 87086 URINE CULTURE/COLONY COUNT: CPT | Performed by: PHYSICIAN ASSISTANT

## 2019-01-23 PROCEDURE — 87088 URINE BACTERIA CULTURE: CPT | Performed by: PHYSICIAN ASSISTANT

## 2019-01-23 PROCEDURE — 81025 URINE PREGNANCY TEST: CPT | Performed by: PHYSICIAN ASSISTANT

## 2019-01-23 PROCEDURE — 87186 SC STD MICRODIL/AGAR DIL: CPT | Performed by: PHYSICIAN ASSISTANT

## 2019-01-23 PROCEDURE — 85025 COMPLETE CBC W/AUTO DIFF WBC: CPT | Performed by: PHYSICIAN ASSISTANT

## 2019-01-23 PROCEDURE — 81001 URINALYSIS AUTO W/SCOPE: CPT | Performed by: PHYSICIAN ASSISTANT

## 2019-01-23 PROCEDURE — 83036 HEMOGLOBIN GLYCOSYLATED A1C: CPT | Performed by: PHYSICIAN ASSISTANT

## 2019-01-23 PROCEDURE — 36415 COLL VENOUS BLD VENIPUNCTURE: CPT | Performed by: PHYSICIAN ASSISTANT

## 2019-01-23 PROCEDURE — 86140 C-REACTIVE PROTEIN: CPT | Performed by: PHYSICIAN ASSISTANT

## 2019-01-23 PROCEDURE — 99214 OFFICE O/P EST MOD 30 MIN: CPT | Performed by: PHYSICIAN ASSISTANT

## 2019-01-23 RX ORDER — SULFAMETHOXAZOLE/TRIMETHOPRIM 800-160 MG
1 TABLET ORAL 2 TIMES DAILY
Qty: 14 TABLET | Refills: 0 | Status: SHIPPED | OUTPATIENT
Start: 2019-01-23 | End: 2019-01-30

## 2019-01-23 NOTE — PROGRESS NOTES
SUBJECTIVE:   Chloe Obando is a 23 year old female who presents to clinic today for the following health issues:    Chief Complaint   Patient presents with     Abdominal Pain       ABDOMINAL   PAIN     Onset: 24 hours    Description:   Character: Sharp with movement or in the car the bumping.  and Dull ache  Location: right lower quadrant  Radiation: towards Umbilica    Intensity: moderate    Progression of Symptoms:  worsening and intermittent    Accompanying Signs & Symptoms:  Fever/Chills?: no   Gas/Bloating: no   Nausea: YES  Vomitting: no   Diarrhea?: no   Constipation:no   Dysuria or Hematuria: no   Pushed fluids yesterday so had some frequency      History:   Trauma: no   Previous similar pain: no    Previous tests done: none    Precipitating factors:   Does the pain change with:     Food: no      BM: no     Urination: no     Alleviating factors:  none    Therapies Tried and outcome: none    LMP:  - no regular menses    Due to PCOS     ON BCP        -------------------------------------    Problem list and histories reviewed & adjusted, as indicated.  Additional history: as documented    Labs reviewed in EPIC    Reviewed and updated as needed this visit by clinical staff       Reviewed and updated as needed this visit by Provider  Tobacco  Allergies  Meds  Soc Hx        ROS:  Constitutional, HEENT, cardiovascular, pulmonary, gi and gu systems are negative, except as otherwise noted.    OBJECTIVE:     /70   Pulse 74   Temp 98  F (36.7  C) (Oral)   Resp 16   Wt 98.9 kg (218 lb)   SpO2 99%   BMI 40.86 kg/m    Body mass index is 40.86 kg/m .  GENERAL: healthy, alert and no distress  RESP: lungs clear to auscultation - no rales, rhonchi or wheezes  CV: regular rate and rhythm, normal S1 S2, no S3 or S4, no murmur, click or rub, no peripheral edema and peripheral pulses strong  ABDOMEN: tenderness suprapubic, RLQ and umbilical and bowel sounds normal  MS: no gross musculoskeletal defects noted, no  edema  SKIN: no suspicious lesions or rashes    Diagnostic Test Results:  Results for orders placed or performed in visit on 01/23/19 (from the past 24 hour(s))   CBC with platelets differential   Result Value Ref Range    WBC 8.8 4.0 - 11.0 10e9/L    RBC Count 4.81 3.8 - 5.2 10e12/L    Hemoglobin 14.0 11.7 - 15.7 g/dL    Hematocrit 41.0 35.0 - 47.0 %    MCV 85 78 - 100 fl    MCH 29.1 26.5 - 33.0 pg    MCHC 34.1 31.5 - 36.5 g/dL    RDW 13.3 10.0 - 15.0 %    Platelet Count 341 150 - 450 10e9/L    % Neutrophils 61.0 %    % Lymphocytes 30.1 %    % Monocytes 7.4 %    % Eosinophils 1.3 %    % Basophils 0.2 %    Absolute Neutrophil 5.4 1.6 - 8.3 10e9/L    Absolute Lymphocytes 2.6 0.8 - 5.3 10e9/L    Absolute Monocytes 0.7 0.0 - 1.3 10e9/L    Absolute Eosinophils 0.1 0.0 - 0.7 10e9/L    Absolute Basophils 0.0 0.0 - 0.2 10e9/L    Diff Method Automated Method    UA reflex to Microscopic and Culture   Result Value Ref Range    Color Urine Yellow     Appearance Urine Clear     Glucose Urine 250 (A) NEG^Negative mg/dL    Bilirubin Urine Negative NEG^Negative    Ketones Urine Negative NEG^Negative mg/dL    Specific Gravity Urine 1.025 1.003 - 1.035    Blood Urine Small (A) NEG^Negative    pH Urine 6.0 5.0 - 7.0 pH    Protein Albumin Urine Negative NEG^Negative mg/dL    Urobilinogen Urine 0.2 0.2 - 1.0 EU/dL    Nitrite Urine Positive (A) NEG^Negative    Leukocyte Esterase Urine Trace (A) NEG^Negative    Source Midstream Urine    HCG qualitative urine   Result Value Ref Range    HCG Qual Urine Negative NEG^Negative   Urine Microscopic   Result Value Ref Range    WBC Urine 0 - 5 OTO5^0 - 5 /HPF    RBC Urine 2-5 (A) OTO2^O - 2 /HPF    Squamous Epithelial /LPF Urine Few FEW^Few /LPF    Bacteria Urine Many (A) NEG^Negative /HPF       ASSESSMENT/PLAN:             1. RLQ abdominal pain    - CBC with platelets differential  - CRP, inflammation  - UA reflex to Microscopic and Culture  - HCG qualitative urine  - Urine Culture Aerobic  Bacterial  - Urine Microscopic  - sulfamethoxazole-trimethoprim (BACTRIM DS/SEPTRA DS) 800-160 MG tablet; Take 1 tablet by mouth 2 times daily for 7 days  Dispense: 14 tablet; Refill: 0    2. Acute cystitis without hematuria    - sulfamethoxazole-trimethoprim (BACTRIM DS/SEPTRA DS) 800-160 MG tablet; Take 1 tablet by mouth 2 times daily for 7 days  Dispense: 14 tablet; Refill: 0    3. Morbid obesity (H)      4. Glucose found in urine on examination    - Hemoglobin A1c    Reviewed results with patient  Note glucose in urine,- prior fasting blood sugars above 126-   Ordered additional testing and if elevated a1c then to follow up with PCP on further management  On Metformin for PCOS  No dx of DM on problem list.      Patient Instructions   Monitor closely   If abdominal pain does not improve - or you get a fever- worsening pain then recheck with your regular provider.              Mague Vásquez PA-C  [facility]

## 2019-01-23 NOTE — PATIENT INSTRUCTIONS
Monitor closely   If abdominal pain does not improve - or you get a fever- worsening pain then recheck with your regular provider.

## 2019-01-24 ENCOUNTER — MYC MEDICAL ADVICE (OUTPATIENT)
Dept: FAMILY MEDICINE | Facility: CLINIC | Age: 24
End: 2019-01-24

## 2019-01-24 ENCOUNTER — TELEPHONE (OUTPATIENT)
Dept: FAMILY MEDICINE | Facility: CLINIC | Age: 24
End: 2019-01-24

## 2019-01-24 LAB
BACTERIA SPEC CULT: ABNORMAL
SPECIMEN SOURCE: ABNORMAL

## 2019-01-24 ASSESSMENT — ANXIETY QUESTIONNAIRES
GAD7 TOTAL SCORE: 14
4. TROUBLE RELAXING: SEVERAL DAYS
GAD7 TOTAL SCORE: 14
2. NOT BEING ABLE TO STOP OR CONTROL WORRYING: NEARLY EVERY DAY
1. FEELING NERVOUS, ANXIOUS, OR ON EDGE: NEARLY EVERY DAY
7. FEELING AFRAID AS IF SOMETHING AWFUL MIGHT HAPPEN: SEVERAL DAYS
6. BECOMING EASILY ANNOYED OR IRRITABLE: MORE THAN HALF THE DAYS
3. WORRYING TOO MUCH ABOUT DIFFERENT THINGS: MORE THAN HALF THE DAYS
5. BEING SO RESTLESS THAT IT IS HARD TO SIT STILL: MORE THAN HALF THE DAYS
GAD7 TOTAL SCORE: 14
7. FEELING AFRAID AS IF SOMETHING AWFUL MIGHT HAPPEN: SEVERAL DAYS

## 2019-01-24 ASSESSMENT — PATIENT HEALTH QUESTIONNAIRE - PHQ9
SUM OF ALL RESPONSES TO PHQ QUESTIONS 1-9: 10
SUM OF ALL RESPONSES TO PHQ QUESTIONS 1-9: 10
10. IF YOU CHECKED OFF ANY PROBLEMS, HOW DIFFICULT HAVE THESE PROBLEMS MADE IT FOR YOU TO DO YOUR WORK, TAKE CARE OF THINGS AT HOME, OR GET ALONG WITH OTHER PEOPLE: VERY DIFFICULT

## 2019-01-24 NOTE — TELEPHONE ENCOUNTER
Panel Management Review      Patient has the following on her problem list:     Depression / Dysthymia review    Measure:  Needs PHQ-9 score of 4 or less during index window.  Administer PHQ-9 and if score is 5 or more, send encounter to provider for next steps.    5 - 7 month window range: 10/1/18-2/1/19    PHQ-9 SCORE 12/1/2017 5/21/2018 6/1/2018   PHQ-9 Total Score MyChart - 9 (Mild depression) -   PHQ-9 Total Score 12 9 12       If PHQ-9 recheck is 5 or more, route to provider for next steps.    Patient is due for:  PHQ9      Composite cancer screening  Chart review shows that this patient is due/due soon for the following None  Summary:    Patient is due/failing the following:   PHQ9    Action needed:   Patient needs to do PHQ9.    Type of outreach:    Sent Cellc[employer] message.    Questions for provider review:    None                                                                                                                                    Annamaria Linda CMA       Chart routed to none .

## 2019-01-25 ASSESSMENT — PATIENT HEALTH QUESTIONNAIRE - PHQ9: SUM OF ALL RESPONSES TO PHQ QUESTIONS 1-9: 10

## 2019-01-25 ASSESSMENT — ANXIETY QUESTIONNAIRES: GAD7 TOTAL SCORE: 14

## 2019-05-30 ENCOUNTER — OFFICE VISIT (OUTPATIENT)
Dept: FAMILY MEDICINE | Facility: CLINIC | Age: 24
End: 2019-05-30
Payer: COMMERCIAL

## 2019-05-30 VITALS
OXYGEN SATURATION: 97 % | TEMPERATURE: 98.9 F | WEIGHT: 216.5 LBS | HEART RATE: 89 BPM | HEIGHT: 62 IN | DIASTOLIC BLOOD PRESSURE: 64 MMHG | SYSTOLIC BLOOD PRESSURE: 116 MMHG | BODY MASS INDEX: 39.84 KG/M2

## 2019-05-30 DIAGNOSIS — N75.0 BARTHOLIN GLAND CYST: Primary | ICD-10-CM

## 2019-05-30 PROCEDURE — 99213 OFFICE O/P EST LOW 20 MIN: CPT | Performed by: FAMILY MEDICINE

## 2019-05-30 ASSESSMENT — MIFFLIN-ST. JEOR: SCORE: 1682.35

## 2019-05-30 NOTE — PATIENT INSTRUCTIONS
Patient Education     Understanding Bartholin Cyst and Abscess    A woman has two Bartholin glands. They are located on the sides of the vaginal opening. These pea-sized glands make mucus. This mucus lubricates the outside part of the vagina (vulva). If a tube (duct) in one of these glands becomes blocked, it can cause a cyst or abscess.  What causes a Bartholin cyst or abscess?  A cyst can form when the duct of a Bartholin gland becomes blocked. The mucus can t come out of the gland. It builds up. If the cyst becomes infected, it may turn into an abscess.  A blockage in the gland can occur from an injury or an infection. It may also be linked to a sexually transmitted disease.  Symptoms of a Bartholin cyst or abscess  A Bartholin cyst starts as a small bump. It may cause no other symptoms. Or it may grow bigger. It can then cause swelling and pain. If an abscess forms, it can be very painful. You may have trouble walking, sitting, or having sex.  Treatment for a Bartholin cyst or abscess  A cyst that doesn t cause any symptoms may not need to be treated. It may go away on its own. But if you feel discomfort or pain, treatment options include:    Medicine. Over-the-counter pain medicines can help. In some cases, you may need antibiotics if an infection is severe or a cyst or abscess comes back.    Sitz bath. Soaking the genital area in warm water can ease pain.    Drainage. Cutting open the cyst allows the fluid inside it to drain. This eases discomfort and pain. The doctor may insert a tube (catheter) to help with drainage. This catheter may need to stay in place for up to 3 weeks.    Surgery. You may need to have the Bartholin glands removed if other treatments don t work.  When to call your healthcare provider  Call your healthcare provider right away if you have any of these:    Fever of 100.4 F (38 C) or higher, or as directed    Redness, swelling, or fluid leaking from the cyst that gets worse    Pain that  gets worse    Symptoms that don t get better, or get worse    New symptoms   Date Last Reviewed: 6/1/2016 2000-2018 [employer] [address] [address], [place] [address]. All rights reserved. This information is not intended as a substitute for professional medical care. Always follow your healthcare professional's instructions.

## 2019-05-30 NOTE — PROGRESS NOTES
"Subjective     Chloe Obando is a 23 year old female who presents to clinic today for the following health issues:    HPI  - pain/swelling in left lower labial area   started 4 days ago - very swollen and tender, worse when she would wipe or cross her legs.   2 days ago she was feeling/squeezing the lump and had some drainage that was brownish and thick mucus like.   Patient said the pain is gone now and she can't feel a lump.  No pain     No blisters. No tingling/numbness sensation     Review of systems:  No f/c   No malaise or fatigue  No n/v     Reviewed and updated as needed this visit by Provider         Objective    /64 (BP Location: Right arm, Patient Position: Sitting, Cuff Size: Adult Large)   Pulse 89   Temp 98.9  F (37.2  C) (Tympanic)   Ht 1.562 m (5' 1.5\")   Wt 98.2 kg (216 lb 8 oz)   SpO2 97%   BMI 40.24 kg/m    Body mass index is 40.24 kg/m .  Physical Exam   Pt is alert and oriented in no acute distress.  Affect is appropriate. Good eye contact.  GYN - speculum exam - normal external female genitalia. Vagina is pink and moist. No swelling is seen. No drainage or discharge       Assessment/Plan -  (N75.0) Bartholin gland cyst  (primary encounter diagnosis)  Comment: Given location and description, I think she likely had a bartholin's gland abscess that has drained. I recommended loose fitting clothes, cotton undies, and daily sitz baths for a week. Info handout given. The patient indicates understanding of these issues and agrees with the plan.       SARIAH Pisano MD   "

## 2019-07-01 ENCOUNTER — E-VISIT (OUTPATIENT)
Dept: FAMILY MEDICINE | Facility: CLINIC | Age: 24
End: 2019-07-01
Payer: COMMERCIAL

## 2019-07-01 DIAGNOSIS — H10.32 ACUTE CONJUNCTIVITIS OF LEFT EYE, UNSPECIFIED ACUTE CONJUNCTIVITIS TYPE: Primary | ICD-10-CM

## 2019-07-01 PROCEDURE — 99444 ZZC PHYSICIAN ONLINE EVALUATION & MANAGEMENT SERVICE: CPT | Performed by: FAMILY MEDICINE

## 2019-07-01 RX ORDER — POLYMYXIN B SULFATE AND TRIMETHOPRIM 1; 10000 MG/ML; [USP'U]/ML
1 SOLUTION OPHTHALMIC EVERY 6 HOURS
Qty: 2 ML | Refills: 0 | Status: SHIPPED | OUTPATIENT
Start: 2019-07-01 | End: 2019-10-11

## 2019-08-20 ENCOUNTER — OFFICE VISIT (OUTPATIENT)
Dept: FAMILY MEDICINE | Facility: CLINIC | Age: 24
End: 2019-08-20
Payer: COMMERCIAL

## 2019-08-20 VITALS
DIASTOLIC BLOOD PRESSURE: 72 MMHG | SYSTOLIC BLOOD PRESSURE: 120 MMHG | HEART RATE: 75 BPM | RESPIRATION RATE: 16 BRPM | HEIGHT: 62 IN | BODY MASS INDEX: 39.38 KG/M2 | WEIGHT: 214 LBS | OXYGEN SATURATION: 97 %

## 2019-08-20 DIAGNOSIS — F41.1 GENERALIZED ANXIETY DISORDER: Primary | ICD-10-CM

## 2019-08-20 DIAGNOSIS — Z11.3 SCREEN FOR STD (SEXUALLY TRANSMITTED DISEASE): ICD-10-CM

## 2019-08-20 DIAGNOSIS — E66.812 CLASS 2 OBESITY DUE TO EXCESS CALORIES WITHOUT SERIOUS COMORBIDITY WITH BODY MASS INDEX (BMI) OF 39.0 TO 39.9 IN ADULT: ICD-10-CM

## 2019-08-20 DIAGNOSIS — N90.7 LABIAL CYST: ICD-10-CM

## 2019-08-20 DIAGNOSIS — F33.1 MODERATE EPISODE OF RECURRENT MAJOR DEPRESSIVE DISORDER (H): ICD-10-CM

## 2019-08-20 DIAGNOSIS — N92.6 IRREGULAR MENSTRUAL CYCLE: ICD-10-CM

## 2019-08-20 DIAGNOSIS — Z12.4 SCREENING FOR CERVICAL CANCER: ICD-10-CM

## 2019-08-20 DIAGNOSIS — E66.09 CLASS 2 OBESITY DUE TO EXCESS CALORIES WITHOUT SERIOUS COMORBIDITY WITH BODY MASS INDEX (BMI) OF 39.0 TO 39.9 IN ADULT: ICD-10-CM

## 2019-08-20 PROCEDURE — G0145 SCR C/V CYTO,THINLAYER,RESCR: HCPCS | Performed by: FAMILY MEDICINE

## 2019-08-20 PROCEDURE — 87591 N.GONORRHOEAE DNA AMP PROB: CPT | Performed by: FAMILY MEDICINE

## 2019-08-20 PROCEDURE — 87491 CHLMYD TRACH DNA AMP PROBE: CPT | Performed by: FAMILY MEDICINE

## 2019-08-20 PROCEDURE — 99214 OFFICE O/P EST MOD 30 MIN: CPT | Performed by: FAMILY MEDICINE

## 2019-08-20 RX ORDER — VENLAFAXINE HYDROCHLORIDE 37.5 MG/1
150 CAPSULE, EXTENDED RELEASE ORAL DAILY
Qty: 120 CAPSULE | Refills: 1 | Status: SHIPPED | OUTPATIENT
Start: 2019-08-20 | End: 2019-10-08

## 2019-08-20 ASSESSMENT — PAIN SCALES - GENERAL: PAINLEVEL: NO PAIN (0)

## 2019-08-20 ASSESSMENT — ANXIETY QUESTIONNAIRES
1. FEELING NERVOUS, ANXIOUS, OR ON EDGE: NEARLY EVERY DAY
3. WORRYING TOO MUCH ABOUT DIFFERENT THINGS: NEARLY EVERY DAY
GAD7 TOTAL SCORE: 17
7. FEELING AFRAID AS IF SOMETHING AWFUL MIGHT HAPPEN: SEVERAL DAYS
5. BEING SO RESTLESS THAT IT IS HARD TO SIT STILL: MORE THAN HALF THE DAYS
6. BECOMING EASILY ANNOYED OR IRRITABLE: NEARLY EVERY DAY
2. NOT BEING ABLE TO STOP OR CONTROL WORRYING: NEARLY EVERY DAY

## 2019-08-20 ASSESSMENT — PATIENT HEALTH QUESTIONNAIRE - PHQ9
SUM OF ALL RESPONSES TO PHQ QUESTIONS 1-9: 8
5. POOR APPETITE OR OVEREATING: MORE THAN HALF THE DAYS

## 2019-08-20 ASSESSMENT — MIFFLIN-ST. JEOR: SCORE: 1671.01

## 2019-08-20 NOTE — PROGRESS NOTES
"Subjective     Chloe Obando is a 23 year old female who presents to clinic today for the following health issues:    HPI   Depression and Anxiety Follow-Up    How are you doing with your depression since your last visit? Worsened     How are you doing with your anxiety since your last visit?  No change    Are you having other symptoms that might be associated with depression or anxiety? No    Have you had a significant life event? Grief or Loss, financial     Do you have any concerns with your use of alcohol or other drugs? No       Was on lexapro and was switched to zoloft - stopped taking it a year ago   She was on 100mg for 6 months and didn't feel like it was helping     Anxiety has been high for the last couple months.   She is always worrying and stressing about big and little things   Can't \"let things go\"   [place] sensitive  No si/hi     Has tried counseling in the past  - it didn't help     Due for pap - was spotting at the time of the pap today    Had bartholin's abscess 5/2019  Symptoms recurred a few nights ago then resolved.  Uses summer's milady body wash between the labia     Stopped taking the pill. Couldn't remember to take it   Doesn't want to discuss a different option    Review of systems:  No f/c   No headache  No dysuria/urgency/frequency   No vaginal discharge       Social History     Tobacco Use     Smoking status: Never Smoker     Smokeless tobacco: Never Used     Tobacco comment: parents don't smoke in the house   Substance Use Topics     Alcohol use: Yes     Comment: Occasion     Drug use: No     PHQ 6/1/2018 1/24/2019 8/20/2019   PHQ-9 Total Score 12 10 8   Q9: Thoughts of better off dead/self-harm past 2 weeks Not at all Not at all Not at all     KIN-7 SCORE 6/1/2018 1/24/2019 8/20/2019   Total Score - 14 (moderate anxiety) -   Total Score 16 14 17         Reviewed and updated as needed this visit by Provider       Patient Active Problem List   Diagnosis     HYPERLIPIDEMIA     Irregular " "menstrual cycle     Other acne     Plantar warts     GERD (gastroesophageal reflux disease)     Obesity     panic attack     Generalized anxiety disorder     Major depressive disorder, single episode, mild (H)     PCOS (polycystic ovarian syndrome)     Moderate episode of recurrent major depressive disorder (H)     Morbid obesity (H)     No current outpatient medications on file.     No current facility-administered medications for this visit.         Allergies   Allergen Reactions     Hazelnuts [Nuts] Rash             Objective    /72 (BP Location: Right arm, Patient Position: Sitting, Cuff Size: Adult Large)   Pulse 75   Resp 16   Ht 1.562 m (5' 1.5\")   Wt 97.1 kg (214 lb)   LMP 08/05/2019 (Approximate)   SpO2 97%   Breastfeeding? No   BMI 39.78 kg/m    Body mass index is 39.78 kg/m .  GENERAL - Pt is alert and oriented in no acute distress.  Affect is appropriate. Good eye contact.  RESPIRATORY - Clear to auscultation bilaterally.  No wheezing noted  CV - RRR, no murmurs, rubs, gallops.   GYN - speculum exam - normal external female genitalia. Vagina is pink and moist along its course. Cervix appears normal - no lesions seen. Normal amount of clear mucous.  Bimanual exam reveals no CMT, normal uterus, and no palpable ovarian lesions.    PSYCH - Pt is clean and well-dressed. Oriented to person,place,and time. Cooperative. No speech abnormalities. No psychomotor agitation or retardation.  Thought process was normal and thought content was free of suicidal/homicidal ideation, obsessions, and delusions. Insight and judgement were good.      Assessment/Plan -  (F41.1) Generalized anxiety disorder  (primary encounter diagnosis)  Comment: discussed options with her. Has tried two ssri's without good results so we discussed a switch to effexor today - Discussed risks/benefits/side effects with the patient. Taper up discussed. Counseling referral placed. The patient indicates understanding of these issues " and agrees with the plan.  Clinic f/u appointment with me in 6 weeks.   Plan: venlafaxine (EFFEXOR-XR) 37.5 MG 24 hr capsule,        MENTAL HEALTH REFERRAL  - Adult; Outpatient         Treatment; Individual/Couples/Family/Group         Therapy/Health Psychology; [facility]: [facility] [phone]; We will         contact you to schedule the appointment or         please call with any questions            (Z12.4) Screening for cervical cancer  Comment:   Plan: Pap imaged thin layer screen only - recommended        age 21 - 24 years            (Z11.3) Screen for STD (sexually transmitted disease)  Comment:  Plan: NEISSERIA GONORRHOEA PCR, CHLAMYDIA TRACHOMATIS        PCR            (N92.6) Irregular menstrual cycle  Comment: discussed options. She doesn't want to be on a med at this time   Plan:     (F33.1) Moderate episode of recurrent major depressive disorder (H)  Comment: trial of effexor. See above   Plan:     (E66.09,  Z68.39) Class 2 obesity due to excess calories without serious comorbidity with body mass index (BMI) of 39.0 to 39.9 in adult  Comment: try to get in daily exercise.   Plan:     (N90.7) Labial cyst  Comment: normal exam today. Recommended no body wash in that area. Monitor for recurrence - start sitz baths right away. Come in if symptoms continue. The patient indicates understanding of these issues and agrees with the plan.   Plan:

## 2019-08-21 LAB
C TRACH DNA SPEC QL NAA+PROBE: NEGATIVE
N GONORRHOEA DNA SPEC QL NAA+PROBE: NEGATIVE
SPECIMEN SOURCE: NORMAL
SPECIMEN SOURCE: NORMAL

## 2019-08-21 ASSESSMENT — ANXIETY QUESTIONNAIRES: GAD7 TOTAL SCORE: 17

## 2019-08-22 LAB
COPATH REPORT: NORMAL
PAP: NORMAL

## 2019-09-05 NOTE — NURSING NOTE
"Chief Complaint   Patient presents with     Consult     lack of period       Initial /78  Pulse 82  Ht 5' 1\" (1.549 m)  Wt 206 lb 3.2 oz (93.5 kg)  LMP 11/15/2016 (Approximate)  BMI 38.96 kg/m2 Estimated body mass index is 38.96 kg/(m^2) as calculated from the following:    Height as of this encounter: 5' 1\" (1.549 m).    Weight as of this encounter: 206 lb 3.2 oz (93.5 kg).  Medication Reconciliation: complete     Kristina Damian LPN      " Alert-The patient is alert, awake and responds to voice. The patient is oriented to time, place, and person. The triage nurse is able to obtain subjective information.

## 2019-10-11 ENCOUNTER — OFFICE VISIT (OUTPATIENT)
Dept: FAMILY MEDICINE | Facility: CLINIC | Age: 24
End: 2019-10-11
Payer: COMMERCIAL

## 2019-10-11 VITALS
BODY MASS INDEX: 38.28 KG/M2 | OXYGEN SATURATION: 98 % | DIASTOLIC BLOOD PRESSURE: 80 MMHG | TEMPERATURE: 98.2 F | RESPIRATION RATE: 18 BRPM | HEART RATE: 92 BPM | HEIGHT: 62 IN | SYSTOLIC BLOOD PRESSURE: 132 MMHG | WEIGHT: 208 LBS

## 2019-10-11 DIAGNOSIS — N93.9 ABNORMAL UTERINE BLEEDING: ICD-10-CM

## 2019-10-11 DIAGNOSIS — Z23 NEED FOR PROPHYLACTIC VACCINATION AND INOCULATION AGAINST INFLUENZA: ICD-10-CM

## 2019-10-11 DIAGNOSIS — E28.2 PCOS (POLYCYSTIC OVARIAN SYNDROME): ICD-10-CM

## 2019-10-11 DIAGNOSIS — N93.9 ABNORMAL VAGINAL BLEEDING: Primary | ICD-10-CM

## 2019-10-11 LAB
HCG UR QL: NEGATIVE
TSH SERPL DL<=0.005 MIU/L-ACNC: 1.67 MU/L (ref 0.4–4)

## 2019-10-11 PROCEDURE — 90686 IIV4 VACC NO PRSV 0.5 ML IM: CPT | Performed by: FAMILY MEDICINE

## 2019-10-11 PROCEDURE — 84443 ASSAY THYROID STIM HORMONE: CPT | Performed by: FAMILY MEDICINE

## 2019-10-11 PROCEDURE — 99214 OFFICE O/P EST MOD 30 MIN: CPT | Mod: 25 | Performed by: FAMILY MEDICINE

## 2019-10-11 PROCEDURE — 81025 URINE PREGNANCY TEST: CPT | Performed by: FAMILY MEDICINE

## 2019-10-11 PROCEDURE — 90471 IMMUNIZATION ADMIN: CPT | Performed by: FAMILY MEDICINE

## 2019-10-11 PROCEDURE — 36415 COLL VENOUS BLD VENIPUNCTURE: CPT | Performed by: FAMILY MEDICINE

## 2019-10-11 RX ORDER — NORGESTIMATE AND ETHINYL ESTRADIOL 7DAYSX3 28
1 KIT ORAL DAILY
Qty: 84 TABLET | Refills: 3 | Status: SHIPPED | OUTPATIENT
Start: 2019-10-11 | End: 2019-12-27

## 2019-10-11 ASSESSMENT — MIFFLIN-ST. JEOR: SCORE: 1638.79

## 2019-10-11 ASSESSMENT — PAIN SCALES - GENERAL: PAINLEVEL: NO PAIN (0)

## 2019-10-11 NOTE — PATIENT INSTRUCTIONS
Our Clinic hours are:  Mondays    7:20 am - 7 pm  Tues -  Fri  7:20 am - 5 pm    Clinic Phone: [phone]    The clinic lab opens at 7:30 am Mon - Fri and appointments are required.    [facility]. [phone]  Monday  8 am - 7pm  Tues - Fri 8 am - 5:30 pm

## 2019-10-11 NOTE — PROGRESS NOTES
"Subjective     Chloe Obando is a 24 year old female who presents to clinic today for the following health issues:    HPI Vaginal Bleeding (Dysmenorrhea)  Onset: 3 weeks spotting    Description:   Duration of bleeding episodes: 3 weeks of spotting  Frequency between periods:  irregular  Describe bleeding/flow:   Clots: YES  Number of pads/hour: 2  Cramping: none    Accompanying Signs & Symptoms:  Weakness: no  Lightheadedness: no  Hot flashes: YES  Nosebleeds/Easy bruising: no  Vaginal Discharge: no    History:  Patient's last menstrual period was 09/22/2019.  Previous normal periods: no  Contraceptive use: NO  Possibility of Pregnancy: YES  Any bleeding after intercourse: no  Age of first period (menarche): 12  Abnormal PAP Smears: no    Precipitating factors:       Alleviating factors:      Therapies Tried and outcome:       Has a history of irregular periods due to PCOS.  Has been on OCPs in the past with variable success. Adriane seemed to make her more emotional.  Neocon caused heavier bleeding.     Has been just  Using condoms for birth control in a stable, manganous relationship.    For the past three weeks has been bleeding irregularly, at times soaking through pads/tampons and bleeding through clothing.      Review of Systems   ROS COMP: Constitutional, HEENT, cardiovascular, pulmonary, gi and gu systems are negative, except as otherwise noted.      Objective    /80   Pulse 92   Temp 98.2  F (36.8  C) (Tympanic)   Resp 18   Ht 1.562 m (5' 1.5\")   Wt 94.3 kg (208 lb)   LMP 09/22/2019   SpO2 98%   Breastfeeding? No   BMI 38.66 kg/m    Body mass index is 38.66 kg/m .  Physical Exam   GENERAL: healthy, alert and no distress   (female): normal female external genitalia, normal urethral meatus, vaginal mucosa, normal cervix/adnexa/uterus without masses or discharge. Exam difficult due to body habitus.    Diagnostic Test Results:  Labs reviewed in Epic  Results for orders placed or performed in visit on " 10/11/19 (from the past 24 hour(s))   HCG Qual, Urine ([id number])   Result Value Ref Range    HCG Qual Urine Negative NEG^Negative           Assessment & Plan       ICD-10-CM    1. Abnormal vaginal bleeding N93.9 HCG Qual, Urine ([id number])     US Pelvic Complete w Transvaginal     TSH with free T4 reflex   2. Need for prophylactic vaccination and inoculation against influenza Z23 INFLUENZA VACCINE IM > 6 MONTHS VALENT IIV4 [82784]     Vaccine Administration, Initial [05306]   3. Abnormal uterine bleeding N93.9 norgestim-eth estrad triphasic (TRI-SPRINTEC) 0.18/0.215/0.25 MG-35 MCG tablet   4. PCOS (polycystic ovarian syndrome) E28.2 norgestim-eth estrad triphasic (TRI-SPRINTEC) 0.18/0.215/0.25 MG-35 MCG tablet   likely anovulatory bleeding with the PCOS,  However given the heavier nature of it will r/o thyroid abnormalities and get a pelvic US to r/o fibroids, polyps, etc.   Trial of oral contraceptive pill for the next few months. No contraindications, confirmed with patient.   If not improving, consider IUD or gyn consult for other options.            No follow-ups on file.    Annamaria García MD  [facility]

## 2019-10-15 ENCOUNTER — HOSPITAL ENCOUNTER (OUTPATIENT)
Dept: ULTRASOUND IMAGING | Facility: CLINIC | Age: 24
Discharge: HOME | End: 2019-10-15
Attending: FAMILY MEDICINE | Admitting: FAMILY MEDICINE
Payer: COMMERCIAL

## 2019-10-15 DIAGNOSIS — N93.9 ABNORMAL VAGINAL BLEEDING: ICD-10-CM

## 2019-10-15 PROCEDURE — 76830 TRANSVAGINAL US NON-OB: CPT

## 2019-11-03 ENCOUNTER — HEALTH MAINTENANCE LETTER (OUTPATIENT)
Age: 24
End: 2019-11-03

## 2019-12-27 ENCOUNTER — MYC REFILL (OUTPATIENT)
Dept: FAMILY MEDICINE | Facility: CLINIC | Age: 24
End: 2019-12-27

## 2019-12-27 DIAGNOSIS — E28.2 PCOS (POLYCYSTIC OVARIAN SYNDROME): ICD-10-CM

## 2019-12-27 DIAGNOSIS — F33.1 MODERATE EPISODE OF RECURRENT MAJOR DEPRESSIVE DISORDER (H): ICD-10-CM

## 2019-12-27 DIAGNOSIS — N93.9 ABNORMAL UTERINE BLEEDING: ICD-10-CM

## 2019-12-27 RX ORDER — NORGESTIMATE AND ETHINYL ESTRADIOL 7DAYSX3 28
1 KIT ORAL DAILY
Qty: 84 TABLET | Refills: 1 | Status: SHIPPED | OUTPATIENT
Start: 2019-12-27 | End: 2020-11-24

## 2019-12-27 NOTE — TELEPHONE ENCOUNTER
"Refill approved, sent to new pharmacy.      Requested Prescriptions   Pending Prescriptions Disp Refills     norgestim-eth estrad triphasic (TRI-SPRINTEC) 0.18/0.215/0.25 MG-35 MCG tablet 84 tablet 3     Sig: Take 1 tablet by mouth daily       Contraceptives Protocol Passed - 12/27/2019 12:54 PM        Passed - Patient is not a current smoker if age is 35 or older        Passed - Recent (12 mo) or future (30 days) visit within the authorizing provider's specialty     Patient has had an office visit with the authorizing provider or a provider within the authorizing providers department within the previous 12 mos or has a future within next 30 days. See \"Patient Info\" tab in inbasket, or \"Choose Columns\" in Meds & Orders section of the refill encounter.              Passed - Medication is active on med list        Passed - No active pregnancy on record        Passed - No positive pregnancy test in past 12 months        VERONIKA Gonzalez    "

## 2019-12-30 RX ORDER — VENLAFAXINE HYDROCHLORIDE 150 MG/1
150 CAPSULE, EXTENDED RELEASE ORAL DAILY
Qty: 30 CAPSULE | Refills: 0 | Status: SHIPPED | OUTPATIENT
Start: 2019-12-30 | End: 2020-02-03

## 2019-12-30 NOTE — TELEPHONE ENCOUNTER
Medication is being filled for 1 time refill only due to:  Patient has 1/3/2020 appt with Dr. Pisano.    Willie Diaz RN

## 2020-01-03 ENCOUNTER — OFFICE VISIT (OUTPATIENT)
Dept: FAMILY MEDICINE | Facility: CLINIC | Age: 25
End: 2020-01-03
Payer: COMMERCIAL

## 2020-01-03 VITALS
SYSTOLIC BLOOD PRESSURE: 123 MMHG | BODY MASS INDEX: 39.18 KG/M2 | HEIGHT: 62 IN | WEIGHT: 212.9 LBS | TEMPERATURE: 98.6 F | DIASTOLIC BLOOD PRESSURE: 84 MMHG | HEART RATE: 89 BPM

## 2020-01-03 DIAGNOSIS — G43.009 MIGRAINE WITHOUT AURA AND WITHOUT STATUS MIGRAINOSUS, NOT INTRACTABLE: Primary | ICD-10-CM

## 2020-01-03 PROCEDURE — 90714 TD VACC NO PRESV 7 YRS+ IM: CPT | Performed by: FAMILY MEDICINE

## 2020-01-03 PROCEDURE — 99214 OFFICE O/P EST MOD 30 MIN: CPT | Mod: 25 | Performed by: FAMILY MEDICINE

## 2020-01-03 PROCEDURE — 90471 IMMUNIZATION ADMIN: CPT | Performed by: FAMILY MEDICINE

## 2020-01-03 RX ORDER — SUMATRIPTAN 50 MG/1
50 TABLET, FILM COATED ORAL
Qty: 20 TABLET | Refills: 0 | Status: SHIPPED | OUTPATIENT
Start: 2020-01-03 | End: 2020-02-14

## 2020-01-03 ASSESSMENT — MIFFLIN-ST. JEOR: SCORE: 1661.02

## 2020-01-03 NOTE — NURSING NOTE
Prior to immunization administration, verified patients identity using patient s name and date of birth. Please see Immunization Activity for additional information.     Screening Questionnaire for Adult Immunization    Are you sick today?   No   Do you have allergies to medications, food, a vaccine component or latex?   Yes   Have you ever had a serious reaction after receiving a vaccination?   No   Do you have a long-term health problem with heart, lung, kidney, or metabolic disease (e.g., diabetes), asthma, a blood disorder, no spleen, complement component deficiency, a cochlear implant, or a spinal fluid leak?  Are you on long-term aspirin therapy?   No   Do you have cancer, leukemia, HIV/AIDS, or any other immune system problem?   No   Do you have a parent, brother, or sister with an immune system problem?   No   In the past 3 months, have you taken medications that affect  your immune system, such as prednisone, other steroids, or anticancer drugs; drugs for the treatment of rheumatoid arthritis, Crohn s disease, or psoriasis; or have you had radiation treatments?   No   Have you had a seizure, or a brain or other nervous system problem?   No   During the past year, have you received a transfusion of blood or blood    products, or been given immune (gamma) globulin or antiviral drug?   No   For women: Are you pregnant or is there a chance you could become       pregnant during the next month?   No   Have you received any vaccinations in the past 4 weeks?   No     Immunization questionnaire was positive for at least one answer.  Notified Dr. Maryann Pisano.        Per orders of Dr. Maryann Pisano, injection of TD given by Yaquelin BOWENS LPN. Patient instructed to remain in clinic for 15 minutes afterwards, and to report any adverse reaction to me immediately.       Screening performed by Yaquelin Borrero LPN on 1/3/2020 at 5:02 PM.

## 2020-01-03 NOTE — PROGRESS NOTES
SUBJECTIVE:                                                    Chloe Obando is a 24 year old female who presents to clinic today for the following health issues:    Headaches  Onset: past few months but worsened this past month     Description:   Location: bilateral in the frontal area  And sometimes back of neck   Character: throbbing pain, starts dull pain  Frequency:  Every couple days   Duration:  At least 24 hours     Intensity: severe    Progression of Symptoms:  worsening and same    Accompanying Signs & Symptoms:  Stiff neck: no   Neck or upper back pain: YES  Fever: no  Sinus pressure: no  Nausea or vomiting: YES- vomiting   Dizziness: YES- slight   Numbness: no  Weakness: no  Visual changes: no    History:   Head trauma: no  Family history of migraines: no  Previous tests for headaches: no  Neurologist evaluations: no  Able to do daily activities: YES- sometimes   Wake with a headaches: no  Do headaches wake you up: YES- just on incident last week when she had a headache for 3 days   Daily pain medication use: no  Work/school stressors/changes: no    Precipitating factors:   Does light make it worse: YES- sometimes   Does sound make it worse: no    Alleviating factors:  Does sleep help: YES- rare   Not positional   Not waking her from sleep    Therapies Tried and outcome: Melatonin, cut back on Caffeine, Sugar, Excedrin, wearing glasses, increased water  Sleep is poor - 7 hours     Frontal location  Start out dull and then worsen if she doesn't rest    Will start to get nausea/vomiting, light sensitive    Minimal daily caffeine use     Review of systems:  No f/c   No vision changes  No weight loss/night sweats    Problem list and histories reviewed & adjusted, as indicated.  Additional history: as documented   Patient Active Problem List   Diagnosis     HYPERLIPIDEMIA     Irregular menstrual cycle     Other acne     Plantar warts     GERD (gastroesophageal reflux disease)     Obesity     panic attack      "Generalized anxiety disorder     PCOS (polycystic ovarian syndrome)     Moderate episode of recurrent major depressive disorder (H)     Morbid obesity (H)     Current Outpatient Medications   Medication     norgestim-eth estrad triphasic (TRI-SPRINTEC) 0.18/0.215/0.25 MG-35 MCG tablet     venlafaxine (EFFEXOR-XR) 150 MG 24 hr capsule     No current facility-administered medications for this visit.         Allergies   Allergen Reactions     Hazelnuts [Nuts] Rash     Patient Active Problem List   Diagnosis     HYPERLIPIDEMIA     Irregular menstrual cycle     Other acne     Plantar warts     GERD (gastroesophageal reflux disease)     Obesity     panic attack     Generalized anxiety disorder     PCOS (polycystic ovarian syndrome)     Moderate episode of recurrent major depressive disorder (H)     Morbid obesity (H)     Current Outpatient Medications   Medication     norgestim-eth estrad triphasic (TRI-SPRINTEC) 0.18/0.215/0.25 MG-35 MCG tablet     venlafaxine (EFFEXOR-XR) 150 MG 24 hr capsule     No current facility-administered medications for this visit.         Allergies   Allergen Reactions     Hazelnuts [Nuts] Rash           OBJECTIVE:                                                    /84   Pulse 89   Temp 98.6  F (37  C) (Tympanic)   Ht 1.562 m (5' 1.5\")   Wt 96.6 kg (212 lb 14.4 oz)   BMI 39.58 kg/m   Body mass index is 39.58 kg/m .   GENERAL - Pt is alert and oriented in no acute distress.  Affect is appropriate. Good eye contact.  HEET - Head is normocephalic, atraumatic.    PERRLA,EEMI. Conjunctiva are free of icterus or erythema.    TMs bilaterally normal. Oropharynx free of masses and lesions, no tonsillar exudate or petechiae.    NECK - Neck is supple w/o LA or thyromegaly  RESPIRATORY - Clear to auscultation bilaterally.  No wheezing noted  CV - RRR, no murmurs, rubs, gallops.   EXTREM - No edema.  NEURO- Reflexes 2+ and equal. Sensation intact. CN II-XII intact. Rapidly alt. movements and " finger-nose-finger intact. Negative Romberg.         ASSESSMENT/PLAN:                                                    (G43.009) Migraine without aura and without status migrainosus, not intractable  (primary encounter diagnosis)  Comment: discussed headaches. I think these are likely migraine. Will work on headache journal to try and establish trigger and regular routines. Ok to try imitrex. F/u in one month to discuss.  The patient indicates understanding of these issues and agrees with the plan.   Plan: SUMAtriptan (IMITREX) 50 MG tablet           C. Yvrose Pisano MD   [facility]

## 2020-02-05 ENCOUNTER — TELEPHONE (OUTPATIENT)
Dept: FAMILY MEDICINE | Facility: CLINIC | Age: 25
End: 2020-02-05

## 2020-02-14 ENCOUNTER — OFFICE VISIT (OUTPATIENT)
Dept: FAMILY MEDICINE | Facility: CLINIC | Age: 25
End: 2020-02-14
Payer: COMMERCIAL

## 2020-02-14 VITALS
RESPIRATION RATE: 16 BRPM | HEIGHT: 62 IN | SYSTOLIC BLOOD PRESSURE: 137 MMHG | DIASTOLIC BLOOD PRESSURE: 89 MMHG | BODY MASS INDEX: 39.01 KG/M2 | HEART RATE: 87 BPM | TEMPERATURE: 98.2 F | WEIGHT: 212 LBS

## 2020-02-14 DIAGNOSIS — G43.009 MIGRAINE WITHOUT AURA AND WITHOUT STATUS MIGRAINOSUS, NOT INTRACTABLE: Primary | ICD-10-CM

## 2020-02-14 DIAGNOSIS — G47.09 OTHER INSOMNIA: ICD-10-CM

## 2020-02-14 PROCEDURE — 99214 OFFICE O/P EST MOD 30 MIN: CPT | Performed by: FAMILY MEDICINE

## 2020-02-14 RX ORDER — TRAZODONE HYDROCHLORIDE 50 MG/1
50-100 TABLET, FILM COATED ORAL AT BEDTIME
Qty: 60 TABLET | Refills: 0 | Status: SHIPPED | OUTPATIENT
Start: 2020-02-14

## 2020-02-14 RX ORDER — RIZATRIPTAN BENZOATE 10 MG/1
10 TABLET ORAL
Qty: 10 TABLET | Refills: 0 | Status: SHIPPED | OUTPATIENT
Start: 2020-02-14 | End: 2020-05-08

## 2020-02-14 ASSESSMENT — MIFFLIN-ST. JEOR: SCORE: 1656.94

## 2020-02-14 NOTE — PROGRESS NOTES
"SUBJECTIVE:                                                    Chloe Obando is a 24 year old female who presents to clinic today for the following health issues:    Medication Followup of Imitrex     Taking Medication as prescribed: yes    Side Effects:  Tingling lips and stiff neck also nausea . Itchy in the face.     Medication Helping Symptoms:  NO     Patient is still having migraines        Was last seen 1/3/2020 for migraines   Was given imitrex to try and asked to do headache journal     Current:   Getting them \"less\" - 1-2x/week  Tried the imitrex and it gave her tingly lips, itchy face - these symptoms resolved and headache went away    Did headache journal and identified trigger as poor sleep    Sleep   Falls asleep ok  Wakes often and can't get back to sleep   4 hours of sleep  Melatonin - helps a little   Boyfriend snores  Pet in the bed     Review of systems:  No f/c   No n/v   No diarrhea/constipation     Problem list and histories reviewed & adjusted, as indicated.  Additional history: as documented  Patient Active Problem List   Diagnosis     HYPERLIPIDEMIA     Irregular menstrual cycle     Other acne     Plantar warts     GERD (gastroesophageal reflux disease)     Obesity     panic attack     Generalized anxiety disorder     PCOS (polycystic ovarian syndrome)     Moderate episode of recurrent major depressive disorder (H)     Morbid obesity (H)     Current Outpatient Medications   Medication     norgestim-eth estrad triphasic (TRI-SPRINTEC) 0.18/0.215/0.25 MG-35 MCG tablet     SUMAtriptan (IMITREX) 50 MG tablet     venlafaxine (EFFEXOR-XR) 150 MG 24 hr capsule     No current facility-administered medications for this visit.         Allergies   Allergen Reactions     Hazelnuts [Nuts] Rash          OBJECTIVE:                                                    /89   Pulse 87   Temp 98.2  F (36.8  C) (Tympanic)   Resp 16   Ht 1.562 m (5' 1.5\")   Wt 96.2 kg (212 lb)   BMI 39.41 kg/m   Body " mass index is 39.41 kg/m .   GENERAL - Pt is alert and oriented in no acute distress.  Affect is appropriate. Good eye contact.         ASSESSMENT/PLAN:                                                        (G43.009) Migraine without aura and without status migrainosus, not intractable  (primary encounter diagnosis)  Comment: will try a different prn migraine med. Working on identifying and improving triggers to reduce frequency of migraine. Offered daily med and she isn't ready to do that.   Plan: rizatriptan (MAXALT) 10 MG tablet            (G47.09) Other insomnia  Comment: discussed sleep hygiene at length. Sleep in a separate bed from pet and boyfriend for a few weeks.  Use white noise machine. Trazodone given and discussed. Monitor headaches as sleep improves.  Check back in with me in a month. The patient indicates understanding of these issues and agrees with the plan.   Plan: traZODone (DESYREL) 50 MG tablet            SARIAH Frances MD     [facility]

## 2020-05-05 ENCOUNTER — MYC REFILL (OUTPATIENT)
Dept: FAMILY MEDICINE | Facility: CLINIC | Age: 25
End: 2020-05-05

## 2020-05-05 ENCOUNTER — MYC MEDICAL ADVICE (OUTPATIENT)
Dept: FAMILY MEDICINE | Facility: CLINIC | Age: 25
End: 2020-05-05

## 2020-05-05 DIAGNOSIS — F33.1 MODERATE EPISODE OF RECURRENT MAJOR DEPRESSIVE DISORDER (H): ICD-10-CM

## 2020-05-05 RX ORDER — VENLAFAXINE HYDROCHLORIDE 150 MG/1
150 CAPSULE, EXTENDED RELEASE ORAL DAILY
Qty: 90 CAPSULE | Refills: 0 | Status: CANCELLED | OUTPATIENT
Start: 2020-05-05

## 2020-05-06 ENCOUNTER — MYC REFILL (OUTPATIENT)
Dept: FAMILY MEDICINE | Facility: CLINIC | Age: 25
End: 2020-05-06

## 2020-05-06 ENCOUNTER — E-VISIT (OUTPATIENT)
Dept: FAMILY MEDICINE | Facility: CLINIC | Age: 25
End: 2020-05-06
Payer: COMMERCIAL

## 2020-05-06 DIAGNOSIS — E28.2 PCOS (POLYCYSTIC OVARIAN SYNDROME): Primary | ICD-10-CM

## 2020-05-06 DIAGNOSIS — F41.1 GENERALIZED ANXIETY DISORDER: ICD-10-CM

## 2020-05-06 DIAGNOSIS — F33.1 MODERATE EPISODE OF RECURRENT MAJOR DEPRESSIVE DISORDER (H): ICD-10-CM

## 2020-05-06 PROCEDURE — 99207 ZZC NON-BILLABLE SERV PER CHARTING: CPT | Performed by: FAMILY MEDICINE

## 2020-05-06 RX ORDER — VENLAFAXINE HYDROCHLORIDE 150 MG/1
150 CAPSULE, EXTENDED RELEASE ORAL DAILY
Qty: 90 CAPSULE | Refills: 0 | Status: CANCELLED | OUTPATIENT
Start: 2020-05-06

## 2020-05-08 ENCOUNTER — VIRTUAL VISIT (OUTPATIENT)
Dept: FAMILY MEDICINE | Facility: CLINIC | Age: 25
End: 2020-05-08
Payer: COMMERCIAL

## 2020-05-08 DIAGNOSIS — F33.1 MODERATE EPISODE OF RECURRENT MAJOR DEPRESSIVE DISORDER (H): ICD-10-CM

## 2020-05-08 DIAGNOSIS — E28.2 PCOS (POLYCYSTIC OVARIAN SYNDROME): Primary | ICD-10-CM

## 2020-05-08 PROCEDURE — 99214 OFFICE O/P EST MOD 30 MIN: CPT | Mod: GT | Performed by: PHYSICIAN ASSISTANT

## 2020-05-08 RX ORDER — VENLAFAXINE HYDROCHLORIDE 150 MG/1
150 CAPSULE, EXTENDED RELEASE ORAL DAILY
Qty: 90 CAPSULE | Refills: 1 | Status: SHIPPED | OUTPATIENT
Start: 2020-05-08 | End: 2020-11-12

## 2020-05-08 ASSESSMENT — PATIENT HEALTH QUESTIONNAIRE - PHQ9
SUM OF ALL RESPONSES TO PHQ QUESTIONS 1-9: 6
5. POOR APPETITE OR OVEREATING: NOT AT ALL

## 2020-05-08 ASSESSMENT — ANXIETY QUESTIONNAIRES
GAD7 TOTAL SCORE: 8
7. FEELING AFRAID AS IF SOMETHING AWFUL MIGHT HAPPEN: NOT AT ALL
IF YOU CHECKED OFF ANY PROBLEMS ON THIS QUESTIONNAIRE, HOW DIFFICULT HAVE THESE PROBLEMS MADE IT FOR YOU TO DO YOUR WORK, TAKE CARE OF THINGS AT HOME, OR GET ALONG WITH OTHER PEOPLE: VERY DIFFICULT
1. FEELING NERVOUS, ANXIOUS, OR ON EDGE: MORE THAN HALF THE DAYS
6. BECOMING EASILY ANNOYED OR IRRITABLE: MORE THAN HALF THE DAYS
2. NOT BEING ABLE TO STOP OR CONTROL WORRYING: MORE THAN HALF THE DAYS
3. WORRYING TOO MUCH ABOUT DIFFERENT THINGS: MORE THAN HALF THE DAYS
5. BEING SO RESTLESS THAT IT IS HARD TO SIT STILL: NOT AT ALL

## 2020-05-08 ASSESSMENT — PAIN SCALES - GENERAL: PAINLEVEL: NO PAIN (0)

## 2020-05-09 ASSESSMENT — ANXIETY QUESTIONNAIRES: GAD7 TOTAL SCORE: 8

## 2020-11-04 ENCOUNTER — VIRTUAL VISIT (OUTPATIENT)
Dept: FAMILY MEDICINE | Facility: OTHER | Age: 25
End: 2020-11-04
Payer: COMMERCIAL

## 2020-11-04 DIAGNOSIS — Z20.822 SUSPECTED COVID-19 VIRUS INFECTION: Primary | ICD-10-CM

## 2020-11-04 PROCEDURE — 99421 OL DIG E/M SVC 5-10 MIN: CPT | Performed by: NURSE PRACTITIONER

## 2020-11-04 NOTE — PROGRESS NOTES
"Date: 2020 12:48:00  Clinician: Balbina Orourke  Clinician NPI: [id number]  Patient: Chloe Obando  Patient : 1995  Patient Address: [address] [place], [place] [address]  Patient Phone: [phone]  Visit Protocol: URI  Patient Summary:  Chloe is a 25 year old ( : 1995 ) female who initiated a [facility] Visit for COVID-19 (Coronavirus) evaluation and screening. When asked the question \"Please sign me up to receive news, health information and promotions from [facility].\", Chloe responded \"No\".    Chloe states her symptoms started 1-2 days ago.   Her symptoms consist of facial pain or pressure, myalgia, malaise, a sore throat, ear pain, a headache, and a cough. Chloe also feels feverish but was unable to measure her temperature.   Symptom details     Cough: Chloe coughs a few times an hour and her cough is not more bothersome at night. Phlegm comes into her throat when she coughs. She believes her cough is caused by post-nasal drip. The color of the phlegm is clear.     Sore throat: Chloe reports having mild throat pain (1-3 on a 10 point pain scale), does not have exudate on her tonsils, and can swallow liquids. She is not sure if the lymph nodes in her neck are enlarged. A rash has not appeared on the skin since the sore throat started.     Facial pain or pressure: The facial pain or pressure does not feel worse when bending or leaning forward.     Headache: She states the headache is mild (1-3 on a 10 point pain scale).      Chloe denies having vomiting, rhinitis, chills, teeth pain, ageusia, diarrhea, wheezing, nasal congestion, nausea, and anosmia. She also denies taking antibiotic medication in the past month, having recent facial or sinus surgery in the past 60 days, and having a sinus infection within the past year. She is not experiencing dyspnea.   Precipitating events  Within the past week, Chloe has not been exposed to someone with strep throat. She has not recently been exposed to " someone with influenza. Chloe has been in close contact with the following high risk individuals: pregnant women.   Pertinent COVID-19 (Coronavirus) information  Chloe does not work or volunteer as healthcare worker or a . In the past 14 days, Chloe has not worked or volunteered at a healthcare facility or group living setting.   In the past 14 days, she also has not lived in a congregate living setting.   Chloe has had a close contact with a laboratory-confirmed COVID-19 patient within 14 days of symptom onset. She was not exposed at her work. Date Chloe was exposed to the laboratory-confirmed COVID-19 patient: 11/01/2020   Additional information about contact with COVID-19 (Coronavirus) patient as reported by the patient (free text): Was exposed to family on Sunday, was in their home and vehicle. They received positive test this morning 12/04/2020    Since December 2019, Chloe has not been tested for COVID-19 and has not had upper respiratory infection or influenza-like illness.   Pertinent medical history  Chloe does not get yeast infections when she takes antibiotics.   Chloe does not need a return to work/school note.   Weight: 210 lbs   Chloe does not smoke or use smokeless tobacco.   She denies pregnancy and denies breastfeeding. She has menstruated in the past month.   Weight: 210 lbs    MEDICATIONS: venlafaxine oral, ALLERGIES: NKDA  Clinician Response:  Dear Chloe,         Your symptoms show that you may have coronavirus (COVID-19). This&nbsp;illness can cause fever, cough and trouble breathing. Many people get a mild case and get better on their own. Some people can get very sick.  What should I do?  We would like to test you for this virus.  1. Please call [phone] to schedule your visit. Explain that you were referred by [facility] to have a COVID-19 test. Be ready to share your [facility] visit ID number. Do not schedule your appointment until you have had at least 2 days of  "symptoms or you may receive a false negative result.  The following will serve as your written order for this COVID Test, ordered by me, for the indication of suspected COVID [Z20.828]: The test will be ordered in [employer], our electronic health record, after you are scheduled. It will show as ordered and authorized by Redd Cleveland MD.  Order: COVID-19 (Coronavirus) PCR for SYMPTOMATIC testing from [facility].    2. When it's time for your COVID test:  Stay at least 6 feet away from others. (If someone will drive you to your test, stay in the backseat, as far away from the  as you can.)  Cover your mouth and nose with a mask, tissue or washcloth.  Go straight to the testing site. Don't make any stops on the way there or back.    3.Starting now:&nbsp;Stay home and away from others (self-isolate) until:   You've had&nbsp;no&nbsp;fever---and no medicine that reduces fever---for one full day (24 hours).&nbsp;And...  Your other symptoms have gotten better. For example, your cough or breathing has improved.&nbsp;And...  At least&nbsp;10 days&nbsp;have passed since your symptoms started.    During this time, don't leave the house except for testing or medical care.   Stay in your own room, even for meals. Use your own bathroom if you can.  Stay away from others in your home. No hugging, kissing or shaking hands. No visitors.  Don't go to work, school or anywhere else.   Clean \"high touch\" surfaces often (doorknobs, counters, handles, etc.). Use a household cleaning spray or wipes. You'll find a full list of  on the EPA website:&nbsp;www.epa.gov/pesticide-registration/list-n-disinfectants-use-against-sars-cov-2.   Cover your mouth and nose with a mask, tissue or washcloth to avoid spreading germs.  Wash your hands and face often. Use soap and water.  Caregivers in these groups are at risk for severe illness due to COVID-19:  o People 65 years and older  o People who live in a nursing home or long-term care facility  o " People with chronic disease (lung, heart, cancer, diabetes, kidney, liver, immunologic)  o People who have a weakened immune system, including those who:   Are in cancer treatment  Take medicine that weakens the immune system, such as corticosteroids  Had a bone marrow or organ transplant  Have an immune deficiency  Have poorly controlled HIV or AIDS  Are obese (body mass index of 40 or higher)  Smoke regularly   o Caregivers should wear gloves while washing dishes, handling laundry and cleaning bedrooms and bathrooms.  o Use caution when washing and drying laundry: Don't shake dirty laundry, and use the warmest water setting that you can.  o For more tips, go to&nbsp;www.cdc.gov/coronavirus/2019-ncov/downloads/10Things.pdf.   How can I take care of myself?    Get lots of rest. Drink extra fluids&nbsp;(unless a doctor has told you not to).  Take Tylenol (acetaminophen) for fever or pain.&nbsp;If you have liver or kidney problems, ask your family doctor if it's okay to take Tylenol.   Adults can take either:   650 mg (two 325 mg pills) every 4 to 6 hours,&nbsp;or...  1,000 mg (two 500 mg pills) every 8 hours as needed.  Note:&nbsp;Don't take more than 3,000 mg in one day. Acetaminophen is found in many medicines (both prescribed and over-the-counter medicines). Read all labels to be sure you don't take too much.   For children, check the Tylenol bottle for the right dose. The dose is based on the child's age or weight.   If you have other health problems (like cancer, heart failure, an organ transplant or severe kidney disease):&nbsp;Call your specialty clinic if you don't feel better in the next 2 days.    Know when to call 911.&nbsp;Emergency warning signs include:   Trouble breathing or shortness of breath Pain or pressure in the chest that doesn't go away Feeling confused like you haven't felt before, or not being able to wake up Bluish-colored lips or face.  Where can I get more information?   [facility]  -- About COVID-19:&nbsp;[email or web address]  CDC -- What to Do If You're Sick:&nbsp;www.cdc.gov/coronavirus/2019-ncov/about/steps-when-sick.html  [facility] -- Ending Home Isolation:&nbsp;www.cdc.gov/coronavirus/2019-ncov/hcp/disposition-in-home-patients.html  [facility] -- Caring for Someone:&nbsp;www.cdc.gov/coronavirus/2019-ncov/if-you-are-sick/care-for-someone.html  [facility] -- Interim Guidance for Hospital Discharge to Home:&nbsp;www.[facility].mn./diseases/coronavirus/hcp/hospdischarge.pdf  [facility] clinical trials (COVID-19 research studies):&nbsp;clinicalaffairs.[facility]-clinical-trials  Below are the COVID-19 hotlines at the [facility] of Health ([facility]). Interpreters are available.   For health questions: Call 494-224-2136 or 1-302.927.8721 (7 a.m. to 7 p.m.) For questions about schools and childcare: Call 637-248-5153 or 1-891.801.8153 (7 a.m. to 7 p.m.)           Diagnosis: Contact with and (suspected) exposure to other viral communicable diseases  Diagnosis ICD: Z20.828

## 2020-11-05 DIAGNOSIS — Z20.822 SUSPECTED COVID-19 VIRUS INFECTION: ICD-10-CM

## 2020-11-05 PROCEDURE — U0003 INFECTIOUS AGENT DETECTION BY NUCLEIC ACID (DNA OR RNA); SEVERE ACUTE RESPIRATORY SYNDROME CORONAVIRUS 2 (SARS-COV-2) (CORONAVIRUS DISEASE [COVID-19]), AMPLIFIED PROBE TECHNIQUE, MAKING USE OF HIGH THROUGHPUT TECHNOLOGIES AS DESCRIBED BY CMS-2020-01-R: HCPCS | Performed by: NURSE PRACTITIONER

## 2020-11-06 LAB
SARS-COV-2 RNA SPEC QL NAA+PROBE: ABNORMAL
SPECIMEN SOURCE: ABNORMAL

## 2020-11-11 ENCOUNTER — MYC REFILL (OUTPATIENT)
Dept: FAMILY MEDICINE | Facility: CLINIC | Age: 25
End: 2020-11-11

## 2020-11-11 DIAGNOSIS — F33.1 MODERATE EPISODE OF RECURRENT MAJOR DEPRESSIVE DISORDER (H): ICD-10-CM

## 2020-11-11 RX ORDER — VENLAFAXINE HYDROCHLORIDE 150 MG/1
150 CAPSULE, EXTENDED RELEASE ORAL DAILY
Qty: 90 CAPSULE | Refills: 1 | Status: CANCELLED | OUTPATIENT
Start: 2020-11-11

## 2020-11-12 RX ORDER — VENLAFAXINE HYDROCHLORIDE 150 MG/1
150 CAPSULE, EXTENDED RELEASE ORAL DAILY
Qty: 30 CAPSULE | Refills: 0 | Status: SHIPPED | OUTPATIENT
Start: 2020-11-12 | End: 2020-12-07

## 2020-11-12 NOTE — TELEPHONE ENCOUNTER
Patient is out of medication and is needing before the weekend. Please contact patient if she needs to be seen but will need something regardless as we can only advance 4 days of medication.     Thank you

## 2020-11-16 ENCOUNTER — HEALTH MAINTENANCE LETTER (OUTPATIENT)
Age: 25
End: 2020-11-16

## 2020-11-23 ENCOUNTER — NURSE TRIAGE (OUTPATIENT)
Dept: NURSING | Facility: CLINIC | Age: 25
End: 2020-11-23

## 2020-11-23 NOTE — TELEPHONE ENCOUNTER
Triage call:   Patient calling with COVID like symptoms returning after having no symptoms  Patient tested positive 11/5/2020, she quarantined and her symptoms resolved Initially had loss of smell and taste      She notes that new symptoms started today    - fever (100.3), chills, body aches, sore throat, headache, pressure in her head, cough (dry- hurts her chest to cough) with a stuffy nose      but did not feel nearly as sick as she does today  Worst symptom- body aches    Recommended a virtual visit within the next 24 hours. Patient agreeable to doing so. Assisted in transferring to scheduling.     COVID 19 Nurse Triage Plan/Patient Instructions    Please be aware that novel coronavirus (COVID-19) may be circulating in the community. If you develop symptoms such as fever, cough, or SOB or if you have concerns about the presence of another infection including coronavirus (COVID-19), please contact your health care provider or visit [email or web address].     Disposition/Instructions    Virtual Visit with provider recommended. Reference Visit Selection Guide.    Thank you for taking steps to prevent the spread of this virus.  o Limit your contact with others.  o Wear a simple mask to cover your cough.  o Wash your hands well and often.    Resources    [facility]: About COVID-19: [email or web address]    CDC: What to Do If You're Sick: www.cdc.gov/coronavirus/2019-ncov/about/steps-when-sick.html    CDC: Ending Home Isolation: www.cdc.gov/coronavirus/2019-ncov/hcp/disposition-in-home-patients.html     CDC: Caring for Someone: www.cdc.gov/coronavirus/2019-ncov/if-you-are-sick/care-for-someone.html     [facility]: Interim Guidance for Hospital Discharge to Home: www.health.[facility].mn.us/diseases/coronavirus/hcp/hospdischarge.pdf    [facility] clinical trials (COVID-19 research studies): clinicalaffairs.[facility]/umn-clinical-trials     Below are the COVID-19 hotlines at the [place] [facility] of [facility]  ([facility]). Interpreters are available.   o For health questions: Call 002-571-2234 or 1-277.419.3875 (7 a.m. to 7 p.m.)  o For questions about schools and childcare: Call 215-600-8289 or 1-596.787.3867 (7 a.m. to 7 p.m.)     Nataliia Polk RN BSN 11/23/2020 2:15 PM      Reason for Disposition    [1] COVID-19 infection suspected by caller or triager AND [2] mild symptoms (cough, fever, or others) AND [3] no complications or SOB    Additional Information    Negative: SEVERE difficulty breathing (e.g., struggling for each breath, speaks in single words)    Negative: Difficult to awaken or acting confused (e.g., disoriented, slurred speech)    Negative: Bluish (or gray) lips or face now    Negative: Shock suspected (e.g., cold/pale/clammy skin, too weak to stand, low BP, rapid pulse)    Negative: Sounds like a life-threatening emergency to the triager    Negative: [1] COVID-19 exposure AND [2] no symptoms    Negative: COVID-19 and Breastfeeding, questions about    Negative: [1] Adult with possible COVID-19 symptoms AND [2] triager concerned about severity of symptoms or other causes    Negative: SEVERE or constant chest pain or pressure (Exception: mild central chest pain, present only when coughing)    Negative: MODERATE difficulty breathing (e.g., speaks in phrases, SOB even at rest, pulse 100-120)    Negative: Patient sounds very sick or weak to the triager    Negative: MILD difficulty breathing (e.g., minimal/no SOB at rest, SOB with walking, pulse <100)    Negative: Chest pain or pressure    Negative: Fever > 103 F (39.4 C)    Negative: [1] Fever > 101 F (38.3 C) AND [2] age > 60    Negative: [1] Fever > 100.0 F (37.8 C) AND [2] bedridden (e.g., nursing home patient, CVA, chronic illness, recovering from surgery)    Negative: HIGH RISK patient (e.g., age > 64 years, diabetes, heart or lung disease, weak immune system) (Exception: Has already been evaluated by healthcare provider and has no new or worsening symptoms)     Negative: Fever present > 3 days (72 hours)    Negative: [1] Fever returns after gone for over 24 hours AND [2] symptoms worse or not improved    Negative: [1] Continuous (nonstop) coughing interferes with work or school AND [2] no improvement using cough treatment per protocol    Protocols used: CORONAVIRUS (COVID-19) DIAGNOSED OR [id number] 8.4.20

## 2020-11-24 ENCOUNTER — VIRTUAL VISIT (OUTPATIENT)
Dept: FAMILY MEDICINE | Facility: CLINIC | Age: 25
End: 2020-11-24
Payer: COMMERCIAL

## 2020-11-24 DIAGNOSIS — U07.1 CLINICAL DIAGNOSIS OF COVID-19: Primary | ICD-10-CM

## 2020-11-24 PROCEDURE — 99213 OFFICE O/P EST LOW 20 MIN: CPT | Mod: TEL | Performed by: PHYSICIAN ASSISTANT

## 2020-11-24 ASSESSMENT — PATIENT HEALTH QUESTIONNAIRE - PHQ9: SUM OF ALL RESPONSES TO PHQ QUESTIONS 1-9: 7

## 2020-11-24 ASSESSMENT — PAIN SCALES - GENERAL: PAINLEVEL: MILD PAIN (3)

## 2020-11-24 NOTE — PROGRESS NOTES
"Chloe Obando is a 25 year old female who is being evaluated via a billable telephone visit.      The patient has been notified of following:     \"This telephone visit will be conducted via a call between you and your physician/provider. We have found that certain health care needs can be provided without the need for a physical exam.  This service lets us provide the care you need with a short phone conversation.  If a prescription is necessary we can send it directly to your pharmacy.  If lab work is needed we can place an order for that and you can then stop by our lab to have the test done at a later time.    Telephone visits are billed at different rates depending on your insurance coverage. During this emergency period, for some insurers they may be billed the same as an in-person visit.  Please reach out to your insurance provider with any questions.    If during the course of the call the physician/provider feels a telephone visit is not appropriate, you will not be charged for this service.\"    Patient has given verbal consent for Telephone visit?  Yes    What phone number would you like to be contacted at? [phone]    How would you like to obtain your AVS? Ange Gong     Chloe Obando is a 25 year old female who presents via phone visit today for the following health issues:    HPI       Concern for COVID-19  About how many days ago did these symptoms start? Sunday  Is this your first visit for this illness? Yes  In the 14 days before your symptoms started, have you had close contact with someone with COVID-19 (Coronavirus)? I do not know.  Do you have a fever or chills? Yes, the highest temperature was 100.3  Are you having new or worsening difficulty breathing? No  Do you have new or worsening cough? YES  Have you had any new or unexplained body aches? YES    Have you experienced any of the following NEW symptoms?    Headache: YES    Sore throat: YES    Loss of taste or smell: YES, but she " tested positive for covid on 11/05/2020. She is wondering if she qualifies to get retested.    Chest pain: YES, when she coughs    Diarrhea: No    Rash: No  What treatments have you tried? Ibuprofen once a day. Nyquil at night  Who do you live with? Boyfriend and roomate  Are you, or a household member, a healthcare worker or a ? YES  Do you live in a nursing home, group home, or shelter? No  Do you have a way to get food/medications if quarantined? Yes, I have a friend or family member who can help me.    Exposure to positive case on 11/1/20  She was tested on 11/5/20 and was also positive- asymptomatic at the time  She quarantined all the way through the 18th just to be safe  She never got super sick - just a little tickle in her throat and did lose her taste and smell    Symptoms developed Sunday, 11/22/20  + fever (100.3)  Loss of taste and smell  Worsening cough  SOB     Wondering if she should get tested again    Review of Systems   Remainder of ROS obtained and found to be negative other than that which was documented above         Objective          Vitals:  No vitals were obtained today due to virtual visit.    healthy, alert and no distress  PSYCH: Alert and oriented times 3; coherent speech, normal   rate and volume, able to articulate logical thoughts, able   to abstract reason, no tangential thoughts, no hallucinations   or delusions  Her affect is normal  RESP: No cough, no audible wheezing, able to talk in full sentences  Remainder of exam unable to be completed due to telephone visits        Assessment/Plan:  (U07.1) Clinical diagnosis of COVID-19  (primary encounter diagnosis)  Comment: positive test on 11/5/20 - ? Mild symptoms that seemed to resolve until full blown symptoms on 11/22/20. Given positive test already and fact that positive test will remain as such for up to 3-4 weeks, unlikely to gain much by testing again. Need to assume that this is COVID and she will again have to  quarantine. Her boyfriend will also need to quarantine  Plan: COVID 19 - is managing symptoms at home. Discussed concerning signs for which she should be evaluated.           Mabel Davison PA-C  [facility]    Phone call duration:  7 minutes

## 2020-12-07 ENCOUNTER — MYC REFILL (OUTPATIENT)
Dept: FAMILY MEDICINE | Facility: CLINIC | Age: 25
End: 2020-12-07

## 2020-12-07 ENCOUNTER — OFFICE VISIT (OUTPATIENT)
Dept: FAMILY MEDICINE | Facility: CLINIC | Age: 25
End: 2020-12-07
Payer: COMMERCIAL

## 2020-12-07 VITALS
OXYGEN SATURATION: 97 % | HEIGHT: 62 IN | BODY MASS INDEX: 37.17 KG/M2 | WEIGHT: 202 LBS | TEMPERATURE: 98.1 F | DIASTOLIC BLOOD PRESSURE: 70 MMHG | HEART RATE: 92 BPM | SYSTOLIC BLOOD PRESSURE: 122 MMHG

## 2020-12-07 DIAGNOSIS — F33.1 MODERATE EPISODE OF RECURRENT MAJOR DEPRESSIVE DISORDER (H): ICD-10-CM

## 2020-12-07 DIAGNOSIS — U07.1 2019 NOVEL CORONAVIRUS DISEASE (COVID-19): ICD-10-CM

## 2020-12-07 DIAGNOSIS — E11.9 TYPE 2 DIABETES MELLITUS WITHOUT COMPLICATION, WITHOUT LONG-TERM CURRENT USE OF INSULIN (H): ICD-10-CM

## 2020-12-07 DIAGNOSIS — Z00.00 ROUTINE GENERAL MEDICAL EXAMINATION AT A HEALTH CARE FACILITY: Primary | ICD-10-CM

## 2020-12-07 DIAGNOSIS — R39.15 URINARY URGENCY: ICD-10-CM

## 2020-12-07 DIAGNOSIS — J98.01 ACUTE BRONCHOSPASM: ICD-10-CM

## 2020-12-07 DIAGNOSIS — E28.2 PCOS (POLYCYSTIC OVARIAN SYNDROME): ICD-10-CM

## 2020-12-07 DIAGNOSIS — R81 GLUCOSURIA: ICD-10-CM

## 2020-12-07 DIAGNOSIS — E66.01 MORBID OBESITY (H): ICD-10-CM

## 2020-12-07 LAB
ALBUMIN SERPL-MCNC: 3.9 G/DL (ref 3.4–5)
ALBUMIN UR-MCNC: 30 MG/DL
ALP SERPL-CCNC: 91 U/L (ref 40–150)
ALT SERPL W P-5'-P-CCNC: 64 U/L (ref 0–50)
ANION GAP SERPL CALCULATED.3IONS-SCNC: 7 MMOL/L (ref 3–14)
APPEARANCE UR: ABNORMAL
AST SERPL W P-5'-P-CCNC: 55 U/L (ref 0–45)
BACTERIA #/AREA URNS HPF: ABNORMAL /HPF
BILIRUB SERPL-MCNC: 0.7 MG/DL (ref 0.2–1.3)
BILIRUB UR QL STRIP: NEGATIVE
BUN SERPL-MCNC: 12 MG/DL (ref 7–30)
CALCIUM SERPL-MCNC: 9.8 MG/DL (ref 8.5–10.1)
CHLORIDE SERPL-SCNC: 100 MMOL/L (ref 94–109)
CO2 SERPL-SCNC: 25 MMOL/L (ref 20–32)
COLOR UR AUTO: YELLOW
CREAT SERPL-MCNC: 0.52 MG/DL (ref 0.52–1.04)
GFR SERPL CREATININE-BSD FRML MDRD: >90 ML/MIN/{1.73_M2}
GLUCOSE SERPL-MCNC: 229 MG/DL (ref 70–99)
GLUCOSE UR STRIP-MCNC: >=1000 MG/DL
HBA1C MFR BLD: 9.4 % (ref 0–5.6)
HGB UR QL STRIP: ABNORMAL
KETONES UR STRIP-MCNC: NEGATIVE MG/DL
LEUKOCYTE ESTERASE UR QL STRIP: NEGATIVE
NITRATE UR QL: NEGATIVE
NON-SQ EPI CELLS #/AREA URNS LPF: ABNORMAL /LPF
PH UR STRIP: 5 PH (ref 5–7)
POTASSIUM SERPL-SCNC: 4.3 MMOL/L (ref 3.4–5.3)
PROT SERPL-MCNC: 8.5 G/DL (ref 6.8–8.8)
RBC #/AREA URNS AUTO: ABNORMAL /HPF
SODIUM SERPL-SCNC: 132 MMOL/L (ref 133–144)
SOURCE: ABNORMAL
SP GR UR STRIP: >1.03 (ref 1–1.03)
UROBILINOGEN UR STRIP-ACNC: 0.2 EU/DL (ref 0.2–1)
WBC #/AREA URNS AUTO: ABNORMAL /HPF

## 2020-12-07 PROCEDURE — 99214 OFFICE O/P EST MOD 30 MIN: CPT | Mod: 25 | Performed by: PHYSICIAN ASSISTANT

## 2020-12-07 PROCEDURE — 81001 URINALYSIS AUTO W/SCOPE: CPT | Performed by: PHYSICIAN ASSISTANT

## 2020-12-07 PROCEDURE — 80053 COMPREHEN METABOLIC PANEL: CPT | Performed by: PHYSICIAN ASSISTANT

## 2020-12-07 PROCEDURE — 87086 URINE CULTURE/COLONY COUNT: CPT | Performed by: PHYSICIAN ASSISTANT

## 2020-12-07 PROCEDURE — 83036 HEMOGLOBIN GLYCOSYLATED A1C: CPT | Performed by: PHYSICIAN ASSISTANT

## 2020-12-07 PROCEDURE — 36415 COLL VENOUS BLD VENIPUNCTURE: CPT | Performed by: PHYSICIAN ASSISTANT

## 2020-12-07 PROCEDURE — 99395 PREV VISIT EST AGE 18-39: CPT | Performed by: PHYSICIAN ASSISTANT

## 2020-12-07 RX ORDER — PREDNISONE 20 MG/1
40 TABLET ORAL DAILY
Qty: 20 TABLET | Refills: 0 | Status: SHIPPED | OUTPATIENT
Start: 2020-12-07 | End: 2020-12-17

## 2020-12-07 RX ORDER — ALBUTEROL SULFATE 90 UG/1
2 POWDER, METERED RESPIRATORY (INHALATION) EVERY 6 HOURS PRN
Qty: 1 INHALER | Refills: 0 | Status: SHIPPED | OUTPATIENT
Start: 2020-12-07

## 2020-12-07 RX ORDER — METFORMIN HCL 500 MG
500 TABLET, EXTENDED RELEASE 24 HR ORAL
Qty: 90 TABLET | Refills: 1 | Status: SHIPPED | OUTPATIENT
Start: 2020-12-07 | End: 2020-12-07

## 2020-12-07 RX ORDER — METFORMIN HCL 500 MG
TABLET, EXTENDED RELEASE 24 HR ORAL
Qty: 180 TABLET | Refills: 1 | Status: SHIPPED | OUTPATIENT
Start: 2020-12-07

## 2020-12-07 ASSESSMENT — ENCOUNTER SYMPTOMS
EYE PAIN: 0
DYSURIA: 0
WEAKNESS: 0
HEADACHES: 0
HEARTBURN: 0
BREAST MASS: 0
SHORTNESS OF BREATH: 0
SORE THROAT: 0
COUGH: 1
ABDOMINAL PAIN: 0
HEMATURIA: 0
MYALGIAS: 0
PARESTHESIAS: 0
CHILLS: 0
JOINT SWELLING: 0
NERVOUS/ANXIOUS: 1
PALPITATIONS: 0
ARTHRALGIAS: 0
NAUSEA: 0
DIARRHEA: 0
FEVER: 0
CONSTIPATION: 0
DIZZINESS: 0
FREQUENCY: 1
HEMATOCHEZIA: 0

## 2020-12-07 ASSESSMENT — PAIN SCALES - GENERAL: PAINLEVEL: NO PAIN (0)

## 2020-12-07 ASSESSMENT — MIFFLIN-ST. JEOR: SCORE: 1606.58

## 2020-12-07 NOTE — PATIENT INSTRUCTIONS
"For the cough:     Take the 5 days of prednisone    Use the albuterol inhaler \"if needed\" for coughing spells which will hopefully become less frequent with the prednisone      For the PCOS - but also diabetes diagnosis:     START metformin - 1 tablet daily for 1 week then increase to 2 tabs daily    Return in 8-12 weeks to recheck the A1c        Preventive Health Recommendations  Female Ages 21 to 25     Yearly exam:     See your health care provider every year in order to  o Review health changes.   o Discuss preventive care.    o Review your medicines if your doctor has prescribed any.      You should be tested each year for STDs (sexually transmitted diseases).       Talk to your provider about how often you should have cholesterol testing.      Get a Pap test every three years. If you have an abnormal result, your doctor may have you test more often.      If you are at risk for diabetes, you should have a diabetes test (fasting glucose).     Shots:     Get a flu shot each year.     Get a tetanus shot every 10 years.     Consider getting the shot (vaccine) that prevents cervical cancer (Gardasil).    Nutrition:     Eat at least 5 servings of fruits and vegetables each day.    Eat whole-grain bread, whole-wheat pasta and brown rice instead of white grains and rice.    Get adequate Calcium and Vitamin D.     Lifestyle    Exercise at least 150 minutes a week each week (30 minutes a day, 5 days a week). This will help you control your weight and prevent disease.    Limit alcohol to one drink per day.    No smoking.     Wear sunscreen to prevent skin cancer.    See your dentist every six months for an exam and cleaning.  "

## 2020-12-07 NOTE — PROGRESS NOTES
SUBJECTIVE:   CC: Chloe Obando is an 25 year old woman who presents for preventive health visit.     3  Patient has been advised of split billing requirements and indicates understanding: Yes    Answers for HPI/ROS submitted by the patient on 12/7/2020   Annual Exam:  Frequency of exercise:: 2-3 days/week  Getting at least 3 servings of Calcium per day:: Yes  Diet:: Regular (no restrictions)  Taking medications regularly:: Yes  Medication side effects:: Not applicable  Bi-annual eye exam:: Yes  Dental care twice a year:: Yes  Sleep apnea or symptoms of sleep apnea:: None  abdominal pain: No  Blood in stool: No  Blood in urine: No  chest pain: No  chills: No  congestion: No  constipation: No  cough: Yes  diarrhea: No  dizziness: No  ear pain: No  eye pain: No  nervous/anxious: Yes  fever: No  frequency: Yes  genital sores: No  headaches: No  hearing loss: No  heartburn: No  arthralgias: No  joint swelling: No  peripheral edema: No  mood changes: No  myalgias: No  nausea: No  dysuria: No  palpitations: No  Skin sensation changes: No  sore throat: No  urgency: Yes  rash: No  shortness of breath: No  visual disturbance: No  weakness: No  pelvic pain: No  vaginal bleeding: No  vaginal discharge: No  tenderness: No  breast mass: No  breast discharge: No  Additional concerns today:: No  Duration of exercise:: 30-45 minutes    -Patient is wanting to discuss her pcos.   Back in May - stopped her birth control as she felt it was contributing to weight gain  HAs since been able to lose some weight  Periods initially after stopping were still regular until September and then they have been more irregular again  Wondering if that is okay or if they should be regular  In the past an OB/GYN had put her on metformin, prenatal, folic acid but she felt it was too many pills so stopped  Said she would be willing to take try something now (other than birth control)    -She has had a cough for 3 weeks now. Barking type cough.  "Completely loses her voice sometimes. Laying down at night is making her not be able to sleep with the cough. She had Covid back in the beginning of November.   Was diagnosed with COVID on 11/5  HAd been exposed to a confirmed positive case and then developed a sore throat and some fatigue so was tested herself  She and her boyfriend (living with her) all quarantined fro 14 days and she did improve from the initial symptoms but then at the very end developed a sore throat and cough  No fevers  Cough is intermittent but she will having coughing \"spells\" where she will cough so hard she will leak urine or have a hard time catching her breath  Throat is scratchy in the morning but otherwise okay    - Also notes that she seems to be urinary often and with an urgency  Denies pain or burning with urination so doesn't feel like typical UTI symptoms    Today's PHQ-2 Score:   PHQ-2 ( 1999 Pfizer) 12/7/2020 8/29/2016   Q1: Little interest or pleasure in doing things 1 1   Q2: Feeling down, depressed or hopeless 1 0   PHQ-2 Score 2 1   Q1: Little interest or pleasure in doing things Several days -   Q2: Feeling down, depressed or hopeless Several days -   PHQ-2 Score 2 -       Abuse: Current or Past(Physical, Sexual or Emotional)- No  Do you feel safe in your environment? Yes        Social History     Tobacco Use     Smoking status: Never Smoker     Smokeless tobacco: Never Used   Substance Use Topics     Alcohol use: Yes     Comment: Occasion     If you drink alcohol do you typically have >3 drinks per day or >7 drinks per week? No                     Reviewed orders with patient.  Reviewed health maintenance and updated orders accordingly - Yes  BP Readings from Last 3 Encounters:   12/07/20 122/70   02/14/20 137/89   01/03/20 123/84    Wt Readings from Last 3 Encounters:   12/07/20 91.6 kg (202 lb)   02/14/20 96.2 kg (212 lb)   01/03/20 96.6 kg (212 lb 14.4 oz)                    Mammogram not appropriate for this patient " "based on age.    Pertinent mammograms are reviewed under the imaging tab.  History of abnormal Pap smear: NO - age 21-29 PAP every 3 years recommended  PAP / HPV 8/20/2019 8/29/2016   PAP NIL NIL     Reviewed and updated as needed this visit by clinical staff  Tobacco  Allergies  Meds   Med Hx  Surg Hx  Fam Hx  Soc Hx        Reviewed and updated as needed this visit by Provider                    ROS:  CONSTITUTIONAL: NEGATIVE for fever, chills, change in weight  INTEGUMENTARU/SKIN: NEGATIVE for worrisome rashes, moles or lesions  EYES: NEGATIVE for vision changes or irritation  ENT: NEGATIVE for ear, mouth and throat problems  RESP: NEGATIVE for significant cough or SOB  BREAST: NEGATIVE for masses, tenderness or discharge  CV: NEGATIVE for chest pain, palpitations or peripheral edema  GI: NEGATIVE for nausea, abdominal pain, heartburn, or change in bowel habits  : NEGATIVE for unusual urinary or vaginal symptoms. Periods are regular.  MUSCULOSKELETAL: NEGATIVE for significant arthralgias or myalgia  NEURO: NEGATIVE for weakness, dizziness or paresthesias  PSYCHIATRIC: NEGATIVE for changes in mood or affect    OBJECTIVE:   /70   Pulse 92   Temp 98.1  F (36.7  C) (Tympanic)   Ht 1.562 m (5' 1.5\")   Wt 91.6 kg (202 lb)   SpO2 97%   BMI 37.55 kg/m    EXAM:  GENERAL: healthy, alert and no distress  EYES: Eyes grossly normal to inspection, PERRL and conjunctivae and sclerae normal  HENT: ear canals and TM's normal, nose and mouth without ulcers or lesions  NECK: no adenopathy, no asymmetry, masses, or scars and thyroid normal to palpation  RESP: lungs clear to auscultation - no rales, rhonchi or wheezes  BREAST: normal without masses, tenderness or nipple discharge and no palpable axillary masses or adenopathy  CV: regular rate and rhythm, normal S1 S2, no S3 or S4, no murmur, click or rub, no peripheral edema and peripheral pulses strong  ABDOMEN: soft, nontender, no hepatosplenomegaly, no masses " and bowel sounds normal  MS: no gross musculoskeletal defects noted, no edema  SKIN: no suspicious lesions or rashes  NEURO: Normal strength and tone, mentation intact and speech normal  PSYCH: mentation appears normal, affect normal/bright    Diagnostic Test Results:  UA RESULTS:  Recent Labs   Lab Test 12/07/20  0931   COLOR Yellow   APPEARANCE Slightly Cloudy   URINEGLC >=1000*   URINEBILI Negative   URINEKETONE Negative   SG >1.030   UBLD Moderate*   URINEPH 5.0   PROTEIN 30*   UROBILINOGEN 0.2   NITRITE Negative   LEUKEST Negative   RBCU O - 2   WBCU 5-10*     A1c: 9.4    ASSESSMENT/PLAN:   (Z00.00) Routine general medical examination at a health care facility  (primary encounter diagnosis)  Comment:   Plan:     (R39.15) Urinary urgency  Comment: UA unremarkable other than spilling over lots of glucose - sent patient back to lab for an A1c  Plan: UA with Microscopic reflex to Culture            (J98.01) Acute bronchospasm  Comment: secondary to recent covid 19 infection. Treat for presumed airway inflammation  Plan: predniSONE (DELTASONE) 20 MG tablet, albuterol         (PROAIR RESPICLICK) 108 (90 Base) MCG/ACT         inhaler            (E28.2) PCOS (polycystic ovarian syndrome)  Comment: discussed metformin for PCOS but then A1c returned elevated so starting as well for new diabetes diagnosis  Plan: metFORMIN (GLUCOPHAGE-XR) 500 MG 24 hr tablet,         DISCONTINUED: metFORMIN (GLUCOPHAGE-XR) 500 MG         24 hr tablet            (U07.1) 2019 novel coronavirus disease (COVID-19)  Comment: improved other than residual cough  Plan: treating with prednisone/inahlers as above    (R81) Glucosuria  Comment: sent back for A1c  Plan: Urine Culture Aerobic Bacterial, Hemoglobin         A1c, Comprehensive metabolic panel (BMP + Alb,         Alk Phos, ALT, AST, Total. Bili, TP)            (E11.9) Type 2 diabetes mellitus without complication, without long-term current use of insulin (H)  Comment: new diagnosis -  "discussed diagnosis and medications. Had already planned on startign metformin. When she returns for follow up discussed that we would talk about addition of an ACE and statin   Plan: start metformin. Return in 8-12 weeks     (E66.01) Morbid obesity (H)  Comment:   Plan: working on diet/weight loss         Patient has been advised of split billing requirements and indicates understanding: Yes  COUNSELING:   Reviewed preventive health counseling, as reflected in patient instructions    Estimated body mass index is 37.55 kg/m  as calculated from the following:    Height as of this encounter: 1.562 m (5' 1.5\").    Weight as of this encounter: 91.6 kg (202 lb).      She reports that she has never smoked. She has never used smokeless tobacco.      Counseling Resources:  ATP IV Guidelines  Pooled Cohorts Equation Calculator  Breast Cancer Risk Calculator  BRCA-Related Cancer Risk Assessment: FHS-7 Tool  FRAX Risk Assessment  ICSI Preventive Guidelines  Dietary Guidelines for Americans, 2010  USDA's MyPlate  ASA Prophylaxis  Lung CA Screening    Mabel Davison PA-C  [facility]  "

## 2020-12-08 LAB
BACTERIA SPEC CULT: NORMAL
Lab: NORMAL
SPECIMEN SOURCE: NORMAL

## 2020-12-09 ENCOUNTER — MYC MEDICAL ADVICE (OUTPATIENT)
Dept: FAMILY MEDICINE | Facility: CLINIC | Age: 25
End: 2020-12-09

## 2020-12-10 PROBLEM — E11.9 DIABETES MELLITUS, TYPE 2 (H): Status: ACTIVE | Noted: 2020-12-10

## 2020-12-10 NOTE — TELEPHONE ENCOUNTER
Patient called and ask for the status of her refill and ask if it can be done before the weekend please.    Liz[place], [place]

## 2020-12-11 RX ORDER — VENLAFAXINE HYDROCHLORIDE 150 MG/1
150 CAPSULE, EXTENDED RELEASE ORAL DAILY
Qty: 90 CAPSULE | Refills: 0 | Status: SHIPPED | OUTPATIENT
Start: 2020-12-11

## 2021-04-03 ENCOUNTER — HEALTH MAINTENANCE LETTER (OUTPATIENT)
Age: 26
End: 2021-04-03

## 2021-05-26 ENCOUNTER — RECORDS - HEALTHEAST (OUTPATIENT)
Dept: ADMINISTRATIVE | Facility: CLINIC | Age: 26
End: 2021-05-26

## 2021-06-01 ENCOUNTER — RECORDS - HEALTHEAST (OUTPATIENT)
Dept: ADMINISTRATIVE | Facility: CLINIC | Age: 26
End: 2021-06-01

## 2021-07-24 ENCOUNTER — HEALTH MAINTENANCE LETTER (OUTPATIENT)
Age: 26
End: 2021-07-24

## 2021-09-18 ENCOUNTER — HEALTH MAINTENANCE LETTER (OUTPATIENT)
Age: 26
End: 2021-09-18

## 2021-11-13 ENCOUNTER — HEALTH MAINTENANCE LETTER (OUTPATIENT)
Age: 26
End: 2021-11-13

## 2022-01-08 ENCOUNTER — HEALTH MAINTENANCE LETTER (OUTPATIENT)
Age: 27
End: 2022-01-08

## 2022-03-05 ENCOUNTER — HEALTH MAINTENANCE LETTER (OUTPATIENT)
Age: 27
End: 2022-03-05

## 2022-06-25 ENCOUNTER — HEALTH MAINTENANCE LETTER (OUTPATIENT)
Age: 27
End: 2022-06-25

## 2022-11-19 ENCOUNTER — HEALTH MAINTENANCE LETTER (OUTPATIENT)
Age: 27
End: 2022-11-19

## 2022-12-12 NOTE — TELEPHONE ENCOUNTER
Call placed to patient.  Voicemail message left with scheduling number given. Informed of recommendation per Dtr Yvrose, need for telephone visit tomorrow morning, 5/8/20.  Corinne Iniguez RN     
Provider E-Visit time total (minutes): 2      pl  
0

## 2023-01-19 NOTE — TELEPHONE ENCOUNTER
Spoke to patient who plans on getting UPT Done today. I did transfer her to schedule lab appointment as she did not have a lab appt scheduled. Celestina Alfonso RN       none

## 2023-04-15 ENCOUNTER — HEALTH MAINTENANCE LETTER (OUTPATIENT)
Age: 28
End: 2023-04-15

## 2023-09-10 ENCOUNTER — HEALTH MAINTENANCE LETTER (OUTPATIENT)
Age: 28
End: 2023-09-10

## 2024-01-28 ENCOUNTER — HEALTH MAINTENANCE LETTER (OUTPATIENT)
Age: 29
End: 2024-01-28

## (undated) DEVICE — PACK SET-UP STD 9102

## (undated) DEVICE — TUBING SUCTION 12"X1/4" N612

## (undated) DEVICE — SYR EAR BULB 2OZ

## (undated) DEVICE — SYR 50ML LL W/O NDL 309653

## (undated) DEVICE — SUCTION TIP YANKAUER STR K87

## (undated) DEVICE — ESU ELEC BLADE 2.75" COATED/INSULATED E1455

## (undated) DEVICE — LABEL MEDICATION SYSTEM  3304

## (undated) DEVICE — ESU SUCTION CAUTERY 10FR FOOT CONTROL E2505-10FR

## (undated) DEVICE — Device

## (undated) DEVICE — ESU CORD BIPOLAR 12' E0509

## (undated) DEVICE — ESU PENCIL W/COATED BLADE E2450H

## (undated) DEVICE — ANTIFOG SOLUTION W/FOAM PAD CF-1001

## (undated) DEVICE — VASELINE PETROLEUM JELLY 5GM 8884433200

## (undated) DEVICE — SOL NACL 0.9% IRRIG 1000ML BOTTLE 07138-09

## (undated) DEVICE — BASIN SET MINOR DISP

## (undated) DEVICE — GLOVE PROTEXIS W/NEU-THERA 8.0  2D73TE80

## (undated) DEVICE — DRSG GAUZE 4X4" 3033

## (undated) RX ORDER — ONDANSETRON 2 MG/ML
INJECTION INTRAMUSCULAR; INTRAVENOUS
Status: DISPENSED
Start: 2017-03-15

## (undated) RX ORDER — SCOLOPAMINE TRANSDERMAL SYSTEM 1 MG/1
PATCH, EXTENDED RELEASE TRANSDERMAL
Status: DISPENSED
Start: 2017-03-15

## (undated) RX ORDER — LIDOCAINE HYDROCHLORIDE 10 MG/ML
INJECTION, SOLUTION EPIDURAL; INFILTRATION; INTRACAUDAL; PERINEURAL
Status: DISPENSED
Start: 2017-03-15

## (undated) RX ORDER — GLYCOPYRROLATE 0.2 MG/ML
INJECTION, SOLUTION INTRAMUSCULAR; INTRAVENOUS
Status: DISPENSED
Start: 2017-03-15

## (undated) RX ORDER — DEXAMETHASONE SODIUM PHOSPHATE 4 MG/ML
INJECTION, SOLUTION INTRA-ARTICULAR; INTRALESIONAL; INTRAMUSCULAR; INTRAVENOUS; SOFT TISSUE
Status: DISPENSED
Start: 2017-03-15

## (undated) RX ORDER — FENTANYL CITRATE 50 UG/ML
INJECTION, SOLUTION INTRAMUSCULAR; INTRAVENOUS
Status: DISPENSED
Start: 2017-03-15

## (undated) RX ORDER — NEOSTIGMINE METHYLSULFATE 5 MG/5 ML
SYRINGE (ML) INTRAVENOUS
Status: DISPENSED
Start: 2017-03-15

## (undated) RX ORDER — PROPOFOL 10 MG/ML
INJECTION, EMULSION INTRAVENOUS
Status: DISPENSED
Start: 2017-03-15

## (undated) RX ORDER — OXYCODONE AND ACETAMINOPHEN 5; 325 MG/1; MG/1
TABLET ORAL
Status: DISPENSED
Start: 2017-03-15